# Patient Record
Sex: FEMALE | Race: OTHER | Employment: UNEMPLOYED | URBAN - METROPOLITAN AREA
[De-identification: names, ages, dates, MRNs, and addresses within clinical notes are randomized per-mention and may not be internally consistent; named-entity substitution may affect disease eponyms.]

---

## 2017-01-06 ENCOUNTER — ROUTINE PRENATAL (OUTPATIENT)
Dept: FAMILY MEDICINE CLINIC | Age: 32
End: 2017-01-06

## 2017-01-06 VITALS
DIASTOLIC BLOOD PRESSURE: 66 MMHG | OXYGEN SATURATION: 100 % | HEART RATE: 82 BPM | TEMPERATURE: 98.1 F | BODY MASS INDEX: 32.47 KG/M2 | RESPIRATION RATE: 17 BRPM | WEIGHT: 190.2 LBS | SYSTOLIC BLOOD PRESSURE: 102 MMHG | HEIGHT: 64 IN

## 2017-01-06 DIAGNOSIS — O09.892 SUPERVISION OF OTHER HIGH RISK PREGNANCIES, SECOND TRIMESTER: Primary | ICD-10-CM

## 2017-01-06 DIAGNOSIS — Z3A.32 32 WEEKS GESTATION OF PREGNANCY: ICD-10-CM

## 2017-01-06 LAB
BILIRUB UR QL STRIP: NEGATIVE
GLUCOSE UR-MCNC: NEGATIVE MG/DL
KETONES P FAST UR STRIP-MCNC: NORMAL MG/DL
PH UR STRIP: 7 [PH] (ref 4.6–8)
PROT UR QL STRIP: NEGATIVE MG/DL
SP GR UR STRIP: 1.02 (ref 1–1.03)
UA UROBILINOGEN AMB POC: NORMAL (ref 0.2–1)
URINALYSIS CLARITY POC: NORMAL
URINALYSIS COLOR POC: YELLOW
URINE BLOOD POC: NEGATIVE
URINE LEUKOCYTES POC: NEGATIVE
URINE NITRITES POC: NEGATIVE

## 2017-01-06 NOTE — PROGRESS NOTES
RETURN OB VISIT SUMMARY    Subjective:   She has no unusual complaints and denies any vb, lof, ctxs, and notes good fetal movement. Objective:   Physical Exam:  ABDOMEN: obese, fundus soft, nontender 32 weeks size and FHT present @ 140s bpm  See prenatal flowsheet and physical exam section    Assessment/Plan:   Supervision of other high risk pregnancy at 32w1d  ED warnings. RTC 2 wks. Normal glucola  Increase iron to BID. Routine Prenatal care.

## 2017-01-06 NOTE — MR AVS SNAPSHOT
Visit Information Lor Traci y Rachel Personal Médico Departamento Teléfono del [facility]. Número de visita 1/6/2017  9:30 AM Lou Oswald [address] [facility] [phone] [id number] Your Appointments 1/16/2017  9:45 AM  
OB VISIT with Lou Oswald MD  
[address] [place] [address]) Appt Note: routine prenatal 33wks; routine prenatal 33wks [address] [place] [address]  
[phone]  
  
   
 [address] [facility] [phone]  
  
    
 1/20/2017  9:30 AM  
OB VISIT with Lou Oswald MD  
[address] [place] [address]) Appt Note: routine prenatal  
 [address] [place] [address]  
[phone] Upcoming Health Maintenance Date Due  
 PAP AKA CERVICAL CYTOLOGY 6/25/2017 DTaP/Tdap/Td series (2 - Td) 12/7/2026 [place]  Review Complete El: 1/6/2017 Por: MD Timoteo Giraldo del:  1/6/2017 No Known Allergies Vacunas actuales Revisadas el:  12/7/2016 Cherry Amabile Influenza Vaccine (Quad) PF 12/7/2016, 12/26/2014 Tdap 12/7/2016, 12/26/2014 No revisadas esta visita You Were Diagnosed With   
  
 [place] Supervision of other high risk pregnancies, second trimester    -  Primary ICD-10-CM: I73.828 ICD-9-CM: V23.89   
 32 weeks gestation of pregnancy     ICD-10-CM: Z3A.32 
ICD-9-CM: V22.2 Partes vitales PS Pulso Temperatura Resp [place] ( percentil de crecimiento) Peso (percentil de crecimiento) 102/66 (BP 1 Location: Right arm, BP Patient Position: Sitting) 82 98.1 °F (36.7 °C) (Oral) 17 5' 4\" (1.626 m) 190 lb 3.2 oz (86.3 kg) LMP (última grady) SpO2 BMI (IMC) Estado obstétrico Estatus de tabaquísmo 05/26/2016 100% 32.65 kg/m2 Pregnant Never Smoker BMI and BSA Data Body Mass Index Body Surface Area  
 32.65 kg/m 2 1.97 m 2 Anish [facility] Name Phone [facility] [address] [phone] [place] lista de medicamentos actualizada Lista actualizada el: 17  9:52 AM.  Gabby Domingo use oakley lista de medicamentos más reciente. PRENATAL PO Take  by mouth.  
  
 sodium chloride 0.65 % nasal spray También conocido kathryn:  SALINE NASAL  
1 spray by Both Nostrils route as needed for Congestion. Hicimos lo siguiente AMB POC URINALYSIS DIP STICK AUTO W/O MICRO [79016 CPT(R)] Instrucciones para el Paciente Weeks 32 to 34 of Your Pregnancy: Care Instructions Your Care Instructions During the last few weeks of your pregnancy, you may have more aches and pains. It's important to rest when you can. Your growing baby is putting more pressure on your bladder. So you may need to urinate more often. Hemorrhoids are also common. These are painful, itchy veins in the rectal area. In the 36th week, most women have a test for group B streptococcus (GBS). GBS is a common bacteria that can live in the vagina and rectum. It can make your baby sick after birth. If you test positive, you will get antibiotics during labor. These will keep your baby from getting the bacteria. You may want to talk with your doctor about banking your baby's umbilical cord blood. This is the blood left in the cord after birth. If you want to save this blood, you must arrange it ahead of time. You can't decide at the last minute. If you haven't already had the Tdap shot during this pregnancy, talk to your doctor about getting it. It will help protect your  against pertussis infection. Follow-up care is a key part of your treatment and safety. Be sure to make and go to all appointments, and call your doctor if you are having problems. It's also a good idea to know your test results and keep a list of the medicines you take. How can you care for yourself at home? Ease hemorrhoids · Get more liquids, fruits, vegetables, and fiber in your diet. This will help keep your stools soft. · Avoid sitting for too long. Lie on your left side several times a day. · Clean yourself with soft, moist toilet paper. Or you can use witch hazel pads or personal hygiene pads. · If you are uncomfortable, try ice packs. Or you can sit in a warm sitz bath. Do these for 20 minutes at a time, as needed. · Use hydrocortisone cream for pain and itching. Two examples are Anusol and Preparation H Hydrocortisone. · Ask your doctor about taking an over-the-counter stool softener. Consider breastfeeding · Experts recommend that women breastfeed for 1 year or longer. Breast milk is the perfect food for babies. · Breast milk is easier for babies to digest than formula. And it is always available, just the right temperature, and free. · In general, babies who are  are healthier than formula-fed babies. ¨  babies are less likely to get ear infections, colds, diarrhea, and pneumonia. ¨  babies who are fed only breast milk are less likely to get asthma and allergies. ¨  babies are less likely to be obese. ¨  babies are less likely to get diabetes or heart disease. · Women who breastfeed have less bleeding after the birth. Their uteruses also shrink back faster. · Some women who breastfeed lose weight faster. Making milk burns calories. · Breastfeeding can lower your risk of breast cancer, ovarian cancer, and osteoporosis. Decide about circumcision for boys · As you make this decision, it may help to think about your personal, Tenriism, and family traditions. You get to decide if you will keep your son's penis natural or if he will be circumcised. · If you decide that you would like to have your baby circumcised, talk with your doctor. You can share your concerns about pain. And you can discuss your preferences for anesthesia. Where can you learn more? Go to http://greg-kelvin.info/. Enter L881 in the search box to learn more about \"Weeks 32 to 34 of Your Pregnancy: Care Instructions. \" Current as of: May 30, 2016 Content Version: 11.1 © 9130-7334 [employer]. Care instructions adapted under license by Spl[employer] (which disclaims liability or warranty for this information). If you have questions about a medical condition or this instruction, always ask your healthcare professional. [address] any warranty or liability for your use of this information. Pregnancy Precautions: Care Instructions Your Care Instructions There is no sure way to prevent labor before your due date ( labor) or to prevent most other pregnancy problems. But there are things you can do to increase your chances of a healthy pregnancy. Go to your appointments, follow your doctor's advice, and take good care of yourself. Eat well, and exercise (if your doctor agrees). And make sure to drink plenty of water. Follow-up care is a key part of your treatment and safety. Be sure to make and go to all appointments, and call your doctor if you are having problems. It's also a good idea to know your test results and keep a list of the medicines you take. How can you care for yourself at home? · Make sure you go to your prenatal appointments. At each visit, your doctor will check your blood pressure. Your doctor will also check to see if you have protein in your urine. High blood pressure and protein in urine are signs of preeclampsia. This condition can be dangerous for you and your baby. · Drink plenty of fluids, enough so that your urine is light yellow or clear like water. Dehydration can cause contractions. If you have kidney, heart, or liver disease and have to limit fluids, talk with your doctor before you increase the amount of fluids you drink. · Tell your doctor right away if you notice any symptoms of an infection, such as: ¨ Burning when you urinate. ¨ A foul-smelling discharge from your vagina. ¨ Vaginal itching. ¨ Unexplained fever. ¨ Unusual pain or soreness in your uterus or lower belly. · Eat a balanced diet. Include plenty of foods that are high in calcium and iron. ¨ Foods high in calcium include milk, cheese, yogurt, almonds, and broccoli. ¨ Foods high in iron include red meat, shellfish, poultry, eggs, beans, raisins, whole-grain bread, and leafy green vegetables. · Do not smoke. If you need help quitting, talk to your doctor about stop-smoking programs and medicines. These can increase your chances of quitting for good. · Do not drink alcohol or use illegal drugs. · Follow your doctor's directions about activity. Your doctor will let you know how much, if any, exercise you can do. · Ask your doctor if you can have sex. If you are at risk for early labor, your doctor may ask you to not have sex. · Take care to prevent falls. During pregnancy, your joints are loose, and your balance is off. Sports such as bicycling, skiing, or in-line skating can increase your risk of falling. And don't ride horses or motorcycles, dive, water ski, scuba dive, or parachute jump while you are pregnant. · Avoid getting very hot. Do not use saunas or hot tubs. Avoid staying out in the sun in hot weather for long periods. Take acetaminophen (Tylenol) to lower a high fever. · Do not take any over-the-counter or herbal medicines or supplements without talking to your doctor or pharmacist first. 
When should you call for help? Call 911 anytime you think you may need emergency care. For example, call if: 
· You passed out (lost consciousness). · You have severe vaginal bleeding. · You have severe pain in your belly or pelvis.  
· You have had fluid gushing or leaking from your vagina and you know or think the umbilical cord is bulging into your vagina. If this happens, immediately get down on your knees so your rear end (buttocks) is higher than your head. This will decrease the pressure on the cord until help arrives. Call your doctor now or seek immediate medical care if: 
· You have signs of preeclampsia, such as: 
¨ Sudden swelling of your face, hands, or feet. ¨ New vision problems (such as dimness or blurring). ¨ A severe headache. · You have any vaginal bleeding. · You have belly pain or cramping. · You have a fever. · You have had regular contractions (with or without pain) for an hour. This means that you have 8 or more within 1 hour or 4 or more in 20 minutes after you change your position and drink fluids. · You have a sudden release of fluid from your vagina. · You have low back pain or pelvic pressure that does not go away. · You notice that your baby has stopped moving or is moving much less than normal. 
Watch closely for changes in your health, and be sure to contact your doctor if you have any problems. Where can you learn more? Go to http://greg-kelvin.info/. Enter [id number] in the search box to learn more about \"Pregnancy Precautions: Care Instructions. \" Current as of: May 30, 2016 Content Version: 11.1 © 1453-4248 [employer]. Care instructions adapted under license by [employer] (which disclaims liability or warranty for this information). If you have questions about a medical condition or this instruction, always ask your healthcare professional. [place] [address] any warranty or liability for your use of this information. Iron-Rich Diet: Care Instructions Your Care Instructions Your body needs iron to make hemoglobin. Hemoglobin is a substance in red blood cells that carries oxygen from the lungs to cells all through your body.  If you do not get enough iron, your body makes fewer and smaller red blood cells. As a result, your body's cells may not get enough oxygen. Adult men need 8 milligrams of iron a day; adult women need 18 milligrams of iron a day. After menopause, women need 8 milligrams of iron a day. A pregnant woman needs 27 milligrams of iron a day. Infants and young children have higher iron needs relative to their size than other age groups. People who have lost blood because of ulcers or heavy menstrual periods may become very low in iron and may develop anemia. Most people can get the iron their bodies need by eating enough of certain iron-rich foods. Your doctor may recommend that you take an iron supplement along with eating an iron-rich diet. Follow-up care is a key part of your treatment and safety. Be sure to make and go to all appointments, and call your doctor if you are having problems. Its also a good idea to know your test results and keep a list of the medicines you take. How can you care for yourself at home? · Make iron-rich foods a part of your daily diet. Iron-rich foods include: ¨ All meats, such as chicken, beef, lamb, pork, fish, and shellfish. Liver is especially high in iron. ¨ Leafy green vegetables. ¨ Raisins, peas, beans, lentils, barley, and eggs. ¨ Iron-fortified breakfast cereals. · Eat foods with vitamin C along with iron-rich foods. Vitamin C helps you absorb more iron from food. Drink a glass of orange juice or another citrus juice with your food. · Eat meat and vegetables or grains together. The iron in meat helps your body absorb the iron in other foods. Where can you learn more? Go to http://greg-kelvin.info/. Enter [id number] in the search box to learn more about \"Iron-Rich Diet: Care Instructions. \" Current as of: July 26, 2016 Content Version: 11.1 © 4223-9354 [employer].  Care instructions adapted under license by IRI (which disclaims liability or warranty for this information). If you have questions about a medical condition or this instruction, always ask your healthcare professional. [address] any warranty or liability for your use of this information. Iron Deficiency Anemia During Pregnancy: Care Instructions Your Care Instructions Iron deficiency anemia means that you don't have enough iron in your blood. You need even more iron when you are pregnant. Without enough iron, you may feel weak and sick. Your skin may look pale. Low iron can cause problems when you give birth. And your risk for problems after you have the baby may rise. Severe anemia is rare. But if you get it, you may be more likely to have your baby early ( birth). Or your baby may have a low birth weight. The food you eat may not give you as much iron as you need. Iron pills can help. Your doctor may advise you to take them. Follow-up care is a key part of your treatment and safety. Be sure to make and go to all appointments, and call your doctor if you are having problems. It's also a good idea to know your test results and keep a list of the medicines you take. How can you care for yourself at home? · If your doctor recommends a multivitamin or iron supplement, take it as directed. Call your doctor if you think you are having a problem with your supplements. · If your doctor tells you to take iron pills: ¨ Try to take the pills on an empty stomach about 1 hour before or 2 hours after meals. But you may need to take iron with food to avoid an upset stomach. ¨ Do not take antacids or drink milk or caffeine drinks (such as coffee, tea, or cola) at the same time or within 2 hours of the time that you take your iron. They can keep your body from absorbing the iron well. ¨ Vitamin C (from food or supplements) helps your body absorb iron.  Try taking iron pills with a glass of orange juice or some other food high in vitamin C. 
 ¨ Iron pills may cause stomach problems, such as heartburn, nausea, diarrhea, constipation, and cramps. Be sure to drink plenty of fluids. And include fruits, vegetables, and fiber in your diet each day. ¨ Do not stop taking iron pills without talking to your doctor first. Even after you start to feel better, it will take several months for your body to build up a store of iron. Call your doctor if you think you are having a problem with your iron pills. ¨ If you miss a pill, do not take a double dose of iron. ¨ Keep iron pills out of the reach of small children. An overdose of iron can be very dangerous. · Eat foods rich in iron. These include red meat, shellfish, poultry, eggs, beans, raisins, whole-grain bread, and leafy green vegetables. · Talk to your doctor about any cravings for nonfood items such as dirt, ashes, lui, or chalk. These cravings can be a sign of iron deficiency anemia. When should you call for help? Call your doctor now or seek immediate medical care if: 
· You are dizzy or lightheaded, or you feel like you may faint. Watch closely for changes in your health, and be sure to contact your doctor if: 
· You have new fatigue, or your fatigue is worse. · You have problems with your medicine, such as nausea and constipation. Where can you learn more? Go to http://greg-kelvin.info/. Enter F416 in the search box to learn more about \"Iron Deficiency Anemia During Pregnancy: Care Instructions. \" Current as of: June 8, 2016 Content Version: 11.1 © 8856-1870 Healthwise, Incorporated. Care instructions adapted under license by [employer] (which disclaims liability or warranty for this information). If you have questions about a medical condition or this instruction, always ask your healthcare professional. [address] any warranty or liability for your use of this information. Introducing [facility]! [facility] introduce portal paciente MyChart . Ahora se puede acceder a partes de rodriguez expediente médico, enviar por correo electrónico la oficina de rodriguez médico y solicitar renovaciones de medicamentos en línea. En rodriguez navegador de Internet , Everton Adamseddon a [email or web address] Alfredito clic en el usuario por Claire Khan? Minal Kasper clic aquí en la sesión Lia Franco. Verá la página de registro [place]. Ingrese rodriguez código de [employer] manuel y kathryn aparece a continuación. Usted no tendrá que [employer] código después de maryuri completado el proceso de registro . Si usted no se inscribe antes de la fecha de caducidad , debe solicitar un nuevo código. · MyChart Código de acceso : [id number] Expires: 1/15/2017  9:32 AM 
 
Ingresa los últimos cuatro dígitos de rodriguez Número de Seguro Social ( xxxx ) y fecha de nacimiento ( dd / mm / aaaa ) kathryn se indica y alfredito clic en Enviar. Usted será llevado a la siguiente página de registro . Crear un ID MyChart . Esta será rodriguez ID de inicio de sesión de MyChart y no puede ser [place] , por lo que pensar en sima que es [place] y fácil de recordar . Crear sima contraseña MyChart . Usted puede cambiar rodriguez contraseña en cualquier momento . Ingrese rodriguez Password Reset de preguntas y [place] . [place] se puede utilizar en un momento posterior si usted olvida rodriguez contraseña. Introduzca rodriguez dirección de correo electrónico . Angely Gen recibirá sima notificación por correo electrónico cuando la nueva información está disponible en MyChart . Verlinda Rumpf clic en Registrarse. Warner Homme milagro y descargar porciones de rodriguez expediente médico. 
Alfredito clic en el enlace de descarga del menú Resumen para descargar sima copia portátil de rodriguez información médica . Si tiene [employer] , por favor visite la sección de preguntas frecuentes del sitio web MyChart . Recuerde, MyChart NO es que se utilizará para las necesidades urgentes. Para emergencias médicas , llame al 911 . Ahora disponible en rodriguez iPhone y Android ! Por favor proporcione raissa resumen de la documentación de cuidado a rodriguez próximo proveedor. Your primary care clinician is listed as Farhat Barnes. If you have any questions after today's visit, please call [phone].

## 2017-01-06 NOTE — PATIENT INSTRUCTIONS
Weeks 32 to 34 of Your Pregnancy: Care Instructions  Your Care Instructions    During the last few weeks of your pregnancy, you may have more aches and pains. It's important to rest when you can. Your growing baby is putting more pressure on your bladder. So you may need to urinate more often. Hemorrhoids are also common. These are painful, itchy veins in the rectal area. In the 36th week, most women have a test for group B streptococcus (GBS). GBS is a common bacteria that can live in the vagina and rectum. It can make your baby sick after birth. If you test positive, you will get antibiotics during labor. These will keep your baby from getting the bacteria. You may want to talk with your doctor about banking your baby's umbilical cord blood. This is the blood left in the cord after birth. If you want to save this blood, you must arrange it ahead of time. You can't decide at the last minute. If you haven't already had the Tdap shot during this pregnancy, talk to your doctor about getting it. It will help protect your  against pertussis infection. Follow-up care is a key part of your treatment and safety. Be sure to make and go to all appointments, and call your doctor if you are having problems. It's also a good idea to know your test results and keep a list of the medicines you take. How can you care for yourself at home? Ease hemorrhoids  · Get more liquids, fruits, vegetables, and fiber in your diet. This will help keep your stools soft. · Avoid sitting for too long. Lie on your left side several times a day. · Clean yourself with soft, moist toilet paper. Or you can use witch hazel pads or personal hygiene pads. · If you are uncomfortable, try ice packs. Or you can sit in a warm sitz bath. Do these for 20 minutes at a time, as needed. · Use hydrocortisone cream for pain and itching. Two examples are Anusol and Preparation H Hydrocortisone.   · Ask your doctor about taking an over-the-counter stool softener. Consider breastfeeding  · Experts recommend that women breastfeed for 1 year or longer. Breast milk is the perfect food for babies. · Breast milk is easier for babies to digest than formula. And it is always available, just the right temperature, and free. · In general, babies who are  are healthier than formula-fed babies. ¨  babies are less likely to get ear infections, colds, diarrhea, and pneumonia. ¨  babies who are fed only breast milk are less likely to get asthma and allergies. ¨  babies are less likely to be obese. ¨  babies are less likely to get diabetes or heart disease. · Women who breastfeed have less bleeding after the birth. Their uteruses also shrink back faster. · Some women who breastfeed lose weight faster. Making milk burns calories. · Breastfeeding can lower your risk of breast cancer, ovarian cancer, and osteoporosis. Decide about circumcision for boys  · As you make this decision, it may help to think about your personal, Muslim, and family traditions. You get to decide if you will keep your son's penis natural or if he will be circumcised. · If you decide that you would like to have your baby circumcised, talk with your doctor. You can share your concerns about pain. And you can discuss your preferences for anesthesia. Where can you learn more? Go to http://greg-kelvin.info/. Enter E664 in the search box to learn more about \"Weeks 32 to 34 of Your Pregnancy: Care Instructions. \"  Current as of: May 30, 2016  Content Version: 11.1  © 9967-0261 [employer]. Care instructions adapted under license by [employer] (which disclaims liability or warranty for this information).  If you have questions about a medical condition or this instruction, always ask your healthcare professional. [place] [address] any warranty or liability for your use of this information. Pregnancy Precautions: Care Instructions  Your Care Instructions  There is no sure way to prevent labor before your due date ( labor) or to prevent most other pregnancy problems. But there are things you can do to increase your chances of a healthy pregnancy. Go to your appointments, follow your doctor's advice, and take good care of yourself. Eat well, and exercise (if your doctor agrees). And make sure to drink plenty of water. Follow-up care is a key part of your treatment and safety. Be sure to make and go to all appointments, and call your doctor if you are having problems. It's also a good idea to know your test results and keep a list of the medicines you take. How can you care for yourself at home? · Make sure you go to your prenatal appointments. At each visit, your doctor will check your blood pressure. Your doctor will also check to see if you have protein in your urine. High blood pressure and protein in urine are signs of preeclampsia. This condition can be dangerous for you and your baby. · Drink plenty of fluids, enough so that your urine is light yellow or clear like water. Dehydration can cause contractions. If you have kidney, heart, or liver disease and have to limit fluids, talk with your doctor before you increase the amount of fluids you drink. · Tell your doctor right away if you notice any symptoms of an infection, such as:  ¨ Burning when you urinate. ¨ A foul-smelling discharge from your vagina. ¨ Vaginal itching. ¨ Unexplained fever. ¨ Unusual pain or soreness in your uterus or lower belly. · Eat a balanced diet. Include plenty of foods that are high in calcium and iron. ¨ Foods high in calcium include milk, cheese, yogurt, almonds, and broccoli. ¨ Foods high in iron include red meat, shellfish, poultry, eggs, beans, raisins, whole-grain bread, and leafy green vegetables. · Do not smoke.  If you need help quitting, talk to your doctor about stop-smoking programs and medicines. These can increase your chances of quitting for good. · Do not drink alcohol or use illegal drugs. · Follow your doctor's directions about activity. Your doctor will let you know how much, if any, exercise you can do. · Ask your doctor if you can have sex. If you are at risk for early labor, your doctor may ask you to not have sex. · Take care to prevent falls. During pregnancy, your joints are loose, and your balance is off. Sports such as bicycling, skiing, or in-line skating can increase your risk of falling. And don't ride horses or motorcycles, dive, water ski, scuba dive, or parachute jump while you are pregnant. · Avoid getting very hot. Do not use saunas or hot tubs. Avoid staying out in the sun in hot weather for long periods. Take acetaminophen (Tylenol) to lower a high fever. · Do not take any over-the-counter or herbal medicines or supplements without talking to your doctor or pharmacist first.  When should you call for help? Call 911 anytime you think you may need emergency care. For example, call if:  · You passed out (lost consciousness). · You have severe vaginal bleeding. · You have severe pain in your belly or pelvis. · You have had fluid gushing or leaking from your vagina and you know or think the umbilical cord is bulging into your vagina. If this happens, immediately get down on your knees so your rear end (buttocks) is higher than your head. This will decrease the pressure on the cord until help arrives. Call your doctor now or seek immediate medical care if:  · You have signs of preeclampsia, such as:  ¨ Sudden swelling of your face, hands, or feet. ¨ New vision problems (such as dimness or blurring). ¨ A severe headache. · You have any vaginal bleeding. · You have belly pain or cramping. · You have a fever. · You have had regular contractions (with or without pain) for an hour.  This means that you have 8 or more within 1 hour or 4 or more in 20 minutes after you change your position and drink fluids. · You have a sudden release of fluid from your vagina. · You have low back pain or pelvic pressure that does not go away. · You notice that your baby has stopped moving or is moving much less than normal.  Watch closely for changes in your health, and be sure to contact your doctor if you have any problems. Where can you learn more? Go to http://greg-kelvin.info/. Enter [id number] in the search box to learn more about \"Pregnancy Precautions: Care Instructions. \"  Current as of: May 30, 2016  Content Version: 11.1  © 8421-9357 [employer]. Care instructions adapted under license by [employer] (which disclaims liability or warranty for this information). If you have questions about a medical condition or this instruction, always ask your healthcare professional. [address] any warranty or liability for your use of this information. Iron-Rich Diet: Care Instructions  Your Care Instructions  Your body needs iron to make hemoglobin. Hemoglobin is a substance in red blood cells that carries oxygen from the lungs to cells all through your body. If you do not get enough iron, your body makes fewer and smaller red blood cells. As a result, your body's cells may not get enough oxygen. Adult men need 8 milligrams of iron a day; adult women need 18 milligrams of iron a day. After menopause, women need 8 milligrams of iron a day. A pregnant woman needs 27 milligrams of iron a day. Infants and young children have higher iron needs relative to their size than other age groups. People who have lost blood because of ulcers or heavy menstrual periods may become very low in iron and may develop anemia. Most people can get the iron their bodies need by eating enough of certain iron-rich foods. Your doctor may recommend that you take an iron supplement along with eating an iron-rich diet.   Follow-up care is a key part of your treatment and safety. Be sure to make and go to all appointments, and call your doctor if you are having problems. Its also a good idea to know your test results and keep a list of the medicines you take. How can you care for yourself at home? · Make iron-rich foods a part of your daily diet. Iron-rich foods include:  ¨ All meats, such as chicken, beef, lamb, pork, fish, and shellfish. Liver is especially high in iron. ¨ Leafy green vegetables. ¨ Raisins, peas, beans, lentils, barley, and eggs. ¨ Iron-fortified breakfast cereals. · Eat foods with vitamin C along with iron-rich foods. Vitamin C helps you absorb more iron from food. Drink a glass of orange juice or another citrus juice with your food. · Eat meat and vegetables or grains together. The iron in meat helps your body absorb the iron in other foods. Where can you learn more? Go to http://greg-kelvin.info/. Enter [id number] in the search box to learn more about \"Iron-Rich Diet: Care Instructions. \"  Current as of: 2016  Content Version: 11.1  © 9605-6868 [employer]. Care instructions adapted under license by [employer] (which disclaims liability or warranty for this information). If you have questions about a medical condition or this instruction, always ask your healthcare professional. [place] [address] any warranty or liability for your use of this information. Iron Deficiency Anemia During Pregnancy: Care Instructions  Your Care Instructions  Iron deficiency anemia means that you don't have enough iron in your blood. You need even more iron when you are pregnant. Without enough iron, you may feel weak and sick. Your skin may look pale. Low iron can cause problems when you give birth. And your risk for problems after you have the baby may rise. Severe anemia is rare.  But if you get it, you may be more likely to have your baby early ( birth). Or your baby may have a low birth weight. The food you eat may not give you as much iron as you need. Iron pills can help. Your doctor may advise you to take them. Follow-up care is a key part of your treatment and safety. Be sure to make and go to all appointments, and call your doctor if you are having problems. It's also a good idea to know your test results and keep a list of the medicines you take. How can you care for yourself at home? · If your doctor recommends a multivitamin or iron supplement, take it as directed. Call your doctor if you think you are having a problem with your supplements. · If your doctor tells you to take iron pills:  ¨ Try to take the pills on an empty stomach about 1 hour before or 2 hours after meals. But you may need to take iron with food to avoid an upset stomach. ¨ Do not take antacids or drink milk or caffeine drinks (such as coffee, tea, or cola) at the same time or within 2 hours of the time that you take your iron. They can keep your body from absorbing the iron well. ¨ Vitamin C (from food or supplements) helps your body absorb iron. Try taking iron pills with a glass of orange juice or some other food high in vitamin C.  ¨ Iron pills may cause stomach problems, such as heartburn, nausea, diarrhea, constipation, and cramps. Be sure to drink plenty of fluids. And include fruits, vegetables, and fiber in your diet each day. ¨ Do not stop taking iron pills without talking to your doctor first. Even after you start to feel better, it will take several months for your body to build up a store of iron. Call your doctor if you think you are having a problem with your iron pills. ¨ If you miss a pill, do not take a double dose of iron. ¨ Keep iron pills out of the reach of small children. An overdose of iron can be very dangerous. · Eat foods rich in iron.  These include red meat, shellfish, poultry, eggs, beans, raisins, whole-grain bread, and leafy green vegetables. · Talk to your doctor about any cravings for nonfood items such as dirt, ashes, lui, or chalk. These cravings can be a sign of iron deficiency anemia. When should you call for help? Call your doctor now or seek immediate medical care if:  · You are dizzy or lightheaded, or you feel like you may faint. Watch closely for changes in your health, and be sure to contact your doctor if:  · You have new fatigue, or your fatigue is worse. · You have problems with your medicine, such as nausea and constipation. Where can you learn more? Go to http://greg-kelvin.info/. Enter R247 in the search box to learn more about \"Iron Deficiency Anemia During Pregnancy: Care Instructions. \"  Current as of: June 8, 2016  Content Version: 11.1  © 3333-1090 [employer]. Care instructions adapted under license by [employer] (which disclaims liability or warranty for this information). If you have questions about a medical condition or this instruction, always ask your healthcare professional. [place] [address] any warranty or liability for your use of this information.

## 2017-01-06 NOTE — PROGRESS NOTES
Chief Complaint   Patient presents with    Routine Prenatal Visit      32w & 1d     1. Have you been to the ER, urgent care clinic since your last visit? Hospitalized since your last visit? No    2. Have you seen or consulted any other health care providers outside of the [address] [place] [address] since your last visit? Include any pap smears or colon screening. No     Pt denies any cramping, bleeding, leakage of fluid. + fetal movement.

## 2017-01-20 ENCOUNTER — ROUTINE PRENATAL (OUTPATIENT)
Dept: FAMILY MEDICINE CLINIC | Age: 32
End: 2017-01-20

## 2017-01-20 VITALS
OXYGEN SATURATION: 100 % | SYSTOLIC BLOOD PRESSURE: 98 MMHG | RESPIRATION RATE: 16 BRPM | TEMPERATURE: 98.9 F | HEART RATE: 100 BPM | DIASTOLIC BLOOD PRESSURE: 63 MMHG | WEIGHT: 192 LBS | BODY MASS INDEX: 32.78 KG/M2 | HEIGHT: 64 IN

## 2017-01-20 DIAGNOSIS — O09.893 SUPERVISION OF OTHER HIGH RISK PREGNANCY, ANTEPARTUM, THIRD TRIMESTER: Primary | ICD-10-CM

## 2017-01-20 LAB
BILIRUB UR QL STRIP: NEGATIVE
GLUCOSE UR-MCNC: NEGATIVE MG/DL
KETONES P FAST UR STRIP-MCNC: NORMAL MG/DL
PH UR STRIP: 7 [PH] (ref 4.6–8)
PROT UR QL STRIP: NEGATIVE MG/DL
SP GR UR STRIP: 1.02 (ref 1–1.03)
UA UROBILINOGEN AMB POC: NORMAL (ref 0.2–1)
URINALYSIS CLARITY POC: CLEAR
URINALYSIS COLOR POC: YELLOW
URINE BLOOD POC: NEGATIVE
URINE LEUKOCYTES POC: NEGATIVE
URINE NITRITES POC: NEGATIVE

## 2017-01-20 NOTE — PROGRESS NOTES
RETURN OB VISIT SUMMARY    Subjective:   She has no unusual complaints and denies any vb, lof, ctxs, and notes good fetal movements. Objective:   Physical Exam:  ABDOMEN: obese, fundus soft, nontender 37 cm and FHT present @ 140s bpm with accelerations to 160s bpm  See prenatal flowsheet and physical exam section    Assessment/Plan:   Normal pregnancy @ 34w1d  ED warnings. RTC 2 wks. Size greater than dates and hx of  demise. Schedule for  testing with growth in 2 wks. Routine Prenatal care.

## 2017-01-20 NOTE — PATIENT INSTRUCTIONS
Semanas 34 a 36 de rodriguez embarazo: Instrucciones de cuidado - [ [place] 34 to 39 of Your Pregnancy: Care Instructions ]  Instrucciones de cuidado    A estas [place], rodriguez bebé y rodriguez abdomen habrán crecido considerablemente. Demarcus es Lonnie de mundo a milan. En un embarazo a término se puede mundo a [employer] 40 y 43. Los pulmones de rodriguez bebé están demarcus listos para respirar aire. Los huesos de la pepe de rodriguez bebé ahora son bastante firmes kathryn para protegerla parag se mantienen lo suficientemente blandos kathryn para moverse al atravesar el canal de [place]. Es posible que sienta entusiasmo, margo, ansiedad o miedo. Quizá se pregunte cómo se dará cuenta de si está en trabajo de parto o qué esperar en carlos momento. Trate de ser flexible con dominique expectativas respecto del nacimiento. Dado que cada nacimiento es diferente, no hay manera de saber exactamente cómo será rodriguez parto. Esta hoja de cuidados la ayudará a saber qué esperar y cómo prepararse. Le podría facilitar el parto. Si todavía no le mon aplicado la vacuna Tdap (tétanos, difteria y tos [place]) mine raissa [place], hable con rodriguez médico acerca de aplicársela. Chuck Melendez a proteger a rodriguez recién nacido contra la infección por tos ferina. En la semana 36, a la mayoría de las mujeres se les hace sima prueba de estreptococos del grace B (GBS, por dominique siglas en inglés). Los estreptococos del grace B son bacterias comunes que pueden vivir en la vagina y el recto. Pueden hacer que rodriguez bebé se enferme después del parto. Si el resultado es positivo, usted recibirá antibióticos mine el trabajo de [place]. Los medicamentos evitarán que rodriguez bebé contraiga las bacterias. La atención de seguimiento es sima parte clave de rodriguez tratamiento y seguridad. Asegúrese de hacer y acudir a todas las citas, y llame a rodriguez médico si está teniendo problemas. También es sima buena idea saber los resultados de los exámenes y mantener sima lista de los medicamentos que navin.   ¿Cómo puede cuidarse en el hogar? Aprenda sobre las alternativas para aliviar el dolor  · El dolor se manifiesta de modo diferente en cada moiz. Hable con rodriguez médico acerca de dominique sentimientos sobre el dolor. · Puede elegir entre varias formas de aliviar el dolor. Estas incluyen medicamentos o técnicas de respiración, así kathryn medidas para estar cómoda. Usted puede utilizar más de Lucio Saint opción. · Si elige un analgésico (medicamento para el dolor) mine el trabajo de [place], hable con rodriguez médico acerca de dominique opciones. Algunos medicamentos reducen la ansiedad y [place] a aliviar parte del dolor. Otros adormecen la parte inferior del cuerpo para que no sienta dolor. · Asegúrese de decirle a rodriguez médico acerca de rodriguez elección de analgésico antes de empezar el trabajo de parto o muy temprano en el [place]. Es posible que pueda cambiar de parecer a medida que avanza el [place]. · Gertrude vez se duerme a sima moiz con medicamentos administrados a través de sima máscara o por vía intravenosa (IV). Trabajo de parto y [place]  · La primera etapa del [place] de parto se divide en zuleima fases: Gerry Charnley y de transición. ¨ La mayoría de las mujeres experimentan la fase latente del [place] de parto en dominique hogares. Usted puede [employer] o descansar, comer refrigerios livianos, beber líquidos eduardo y comenzar a contar las contracciones. ¨ Cuando advierta que se le vuelve difícil hablar mine sima contracción, es posible que esté por pasar a la fase activa. Mine la fase Nishi Chaney, debería ir al hospital si no está allí aún. ¨ Usted está en la fase activa cuando tiene contracciones cada 3 o 4 minutos y rivera alrededor de 60 segundos. El aga uterino comienza a abrirse con Mely Selina. ¨ Si se le rompe la sandeep, las contracciones serán más intensas y más frecuentes. ¨ Mine la fase de transición, el aga uterino se estira y las contracciones se producen con Mely Selina.   ¨ Quizá tenga deseos de pujar, sin embargo es posible que el aga uterino aún no esté preparado. El médico le dirá cuándo pujar. · La segunda etapa comienza cuando el aga uterino se abre por completo y usted está lista para pujar. ¨ Las contracciones son muy intensas a fin de empujar al bebé por el canal de parto. ¨ Sentirá la necesidad de pujar. Podría sentir kathryn si tuviera ganas de evacuar el intestino. ¨ Quizás la entrenen a [place] 41 contracciones. Estas contracciones serán muy intensas parag no ocurrirán con tanta frecuencia. Puede descansar un poco entre contracciones. ¨ Es posible que esté sensible e irritable. Es posible que no se dé cuenta de lo que pasa a rodriguez alrededor. ¨ Un último esfuerzo y habrá nacido rodriguez bebé. · La tercera etapa ocurre cuando con unas cuantas contracciones más se expulsa la placenta. [place] puede durar 30 minutos o menos. · La cuarta etapa es la de recuperación. Es posible que se sienta abrumada con las emociones y exhausta parag alerta. Stacie es un buen momento para comenzar el amamantamiento. ¿Dónde puede encontrar más información en inglés? Amanda Hyde a http://greg-kelvin.info/. Iman Iraheta [id number] en la búsqueda para aprender más acerca de \"Semanas 34 a 39 de rodriguez embarazo: Instrucciones de cuidado - [ Tova Haymaker 34 to 39 of Your Pregnancy: Care Instructions ]. \"  Revisado: 30 cruz, 2016  Versión del contenido: 11.1  © 8081-4046 [employer]. Las instrucciones de cuidado fueron adaptadas bajo licencia por [employer] (which disclaims liability or warranty for this information). Si usted tiene [place] afección médica o sobre estas instrucciones, siempre pregunte a rodriguez profesional de ladan. [employer] niega toda garantía o responsabilidad por rodriguez uso de esta información. Precauciones en el embarazo:  Instrucciones de cuidado - [ Pregnancy Precautions: Care Instructions ]  Instrucciones de cuidado  No hay sima manera sanchez de prevenir el trabajo de parto antes de la fecha esperada (trabajo de parto prematuro) o de prevenir la mayoría de otros problemas en el [facility]. Donnell hay cosas que puede hacer para aumentar las probabilidades de tener un embarazo saludable. Vaya a dominique citas, siga los consejos de rodriguez médico y cuídese. Coma rolf y alfredito ejercicio (si rodriguez médico lo permite). Y asegúrese de alejandro abundante agua. La atención de seguimiento es sima parte clave de rodriguez tratamiento y seguridad. Asegúrese de hacer y acudir a todas las citas, y llame a rodriguez médico si está teniendo problemas. También es sima buena idea saber los resultados de los exámenes y mantener sima lista de los medicamentos que navin. ¿Cómo puede cuidarse en el hogar? · Asegúrese de asistir a las citas prenatales. Rodriguez médico le tomará la presión arterial en cada consulta. Rodriguez médico también comprobará si tiene proteínas en rodriguez orina. Tanto la presión arterial deepthi kathryn la presencia de proteínas en la orina son señales de preeclampsia. Esta afección puede ser peligrosa tanto para usted kathryn para rodriguez bebé. · Nikki abundantes líquidos, suficientes para que rodriguez orina sea de color amarillo saji o transparente kathryn el agua. La deshidratación puede causar contracciones. Si tiene [employer], el corazón o el hígado y tiene que [place]'s líquidos, hable con rodriguez médico antes de aumentar rodriguez consumo. · Notifique a rodriguez médico de inmediato si presenta cualquier síntoma de infección, tales kathryn:  ¨ Ardor cuando orina. ¨ Flujo con mal olor de la vagina. ¨ Comezón en la vagina. ¨ Fiebre sin explicación. ¨ Dolor o sensibilidad inusual en el útero o la parte baja del abdomen. · Aliméntese en forma equilibrada. Incluya muchos alimentos que elpidio ricos en calcio y titus. ¨ Entre los alimentos ricos en calcio se incluyen la [place], el queso, el yogur, Dickie Roof y el brócoli.   ¨ Entre los alimentos ricos en titus se incluyen las kasi martin, los [place], las aves, los [place], los frijoles, las uvas pasas, [place] pan de grano integral y las verduras de hojas verdes. · No fume. Si necesita ayuda para dejar de fumar, hable con rodriguez médico sobre programas y medicamentos para dejar de fumar. Estos pueden aumentar dominique probabilidades de dejar el hábito para siempre. · No david alcohol ni use drogas ilegales. · Siga las instrucciones de rodriguez médico acerca de la [place]. Rodriguez médico le dirá cuánto ejercicio puede hacer. · Pregúntele a rodriguez médico si puede tener [employer]. Si usted está en riesgo de tener trabajo de [place], rodriguez médico podría pedirle que no tenga relaciones sexuales. · [place] precauciones para prevenir las caídas. Vanessa el embarazo las articulaciones están más sueltas y se tiene menos equilibrio. Los deportes tales kathryn el ciclismo, el esquí o el patinaje en línea pueden aumentar el riesgo de caídas. Y no monte a derrick, vira en motocicleta, alfredito clavados, alfredito esquí acuático, bucee, ni salte en paracaídas mientras está embarazada. · Evite calentarse demasiado. No use saunas ni bañeras de hidromasaje. Evite la exposición al sol en climas calientes por mucho tiempo. [place] acetaminofén (Tylenol) para bajar sima fiebre deepthi. · No tome medicamentos de venta shai, productos herbarios ni suplementos sin hablar yuni con rodriguez médico o farmacéutico.  ¿Cuándo debe pedir ayuda? Llame al 911 en cualquier momento que considere que necesita atención de [place]. Por ejemplo, llame si:  · Se desmayó (perdió el conocimiento). · Tiene sangrado vaginal intenso. · Tiene dolor intenso en el vientre o la pelvis. · Le sale abundante líquido o gotea de la vagina y sabe o cristal que el cordón umbilical se está saliendo a rodriguez vagina. Si esto sucede, arrodíllese de inmediato, de manuel forma que dominique nalgas estén más altas que rodriguez pepe. [place] disminuirá la presión sobre el cordón umbilical hasta que llegue la [facility].   Llame a rodriguez médico ahora mismo o busque atención médica inmediata si:  · Tiene señales de preeclampsia, tales kathryn:  ¨ Se le hinchan de manera repentina la felix, las caroline o los pies. ¨ Problemas nuevos con la visión (kathryn oscurecimiento de la visión o visión borrosa). ¨ Dolor de pepe intenso. · Tiene cualquier sangrado vaginal.  · Tiene dolor abdominal o cólicos. · Tiene fiebre. · Ha tenido contracciones regulares (con o sin dolor) por [place]. Alicia significa que tiene 8 o más contracciones en 1 hora o que tiene 4 contracciones o más en 20 minutos después de [place] de posición y alejandro líquidos. · Tiene sima pérdida repentina de líquido por la vagina. · Tiene dolor en la parte baja de la espalda o presión en la pelvis que no desaparece. · Nota que rodriguez bebé ha dejado de moverse o se mueve mucho menos de lo normal.  Preste especial atención a los cambios en rodriguez ladan y asegúrese de comunicarse con rodriguez médico si tiene algún problema. ¿Dónde puede encontrar más información en inglés? Halina Wesley a http://greg-kelvin.info/. Radha Butler [id number] en la búsqueda para aprender más acerca de \"Precauciones en el embarazo: Instrucciones de cuidado - [ Pregnancy Precautions: Care Instructions ]. \"  Revisado: 30 [place], 2016  Versión del contenido: 11.1  © 1672-6984 [employer]wise, Incorporated. Las instrucciones de cuidado fueron adaptadas bajo licencia por [employer] (which disclaims liability or warranty for this information). Si usted tiene [place] afección médica o sobre estas instrucciones, siempre pregunte a rodriguez profesional de ladan. [employer] Inc[employer] niega toda garantía o responsabilidad por rodriguez uso de esta información.

## 2017-01-20 NOTE — PROGRESS NOTES
Chief Complaint   Patient presents with    Routine Prenatal Visit      34w&1d     1. Have you been to the ER, urgent care clinic since your last visit? Hospitalized since your last visit? No    2. Have you seen or consulted any other health care providers outside of the [address] [place] [address] since your last visit? Include any pap smears or colon screening. No     Pt denies any cramping, bleeding or leakage of fluid. + fetal movement.

## 2017-01-20 NOTE — MR AVS SNAPSHOT
Visit Information Ronak Jaime Personal Médico Departamento Teléfono del Dep. Número de visita 1/20/2017  9:30 AM Israel Acosta [address] [facility] [phone] [id number] Follow-up Instructions Return in about 2 weeks (around 2/3/2017), or if symptoms worsen or fail to improve. Follow-up and Disposition History Your Appointments 2/1/2017  2:15 PM  
OB VISIT with Israel Acosta MD  
[address] [facility] [address] [place] [address]) Appt Note: prenatal visit 36wk & 4 d  
 [address] [facility]  
[phone]  
  
   
 [address] Thelma Speak [address] Upcoming Health Maintenance Date Due  
 PAP AKA CERVICAL CYTOLOGY 6/25/2017 DTaP/Tdap/Td series (2 - Td) 12/7/2026 Alergias  Review Complete El: 1/20/2017 Por: Walter Mar LPN A partir del:  1/20/2017 No Known Allergies Vacunas actuales Revisadas el:  12/7/2016 Wilmon Members Influenza Vaccine (Quad) PF 12/7/2016, 12/26/2014 Tdap 12/7/2016, 12/26/2014 No revisadas esta visita You Were Diagnosed With   
  
 Rosita Nip Supervision of other high risk pregnancy, antepartum, third trimester    -  Primary ICD-10-CM: C88.919 ICD-9-CM: V23.89 Partes vitales PS Pulso Temperatura Resp [place] ( percentil de crecimiento) Peso (percentil de crecimiento) 98/63 (BP 1 Location: Left arm, BP Patient Position: Sitting) 100 98.9 °F (37.2 °C) (Oral) 16 5' 4\" (1.626 m) 192 lb (87.1 kg) LMP (última grady) SpO2 BMI (IMC) Estado obstétrico Estatus de tabaquísmo 05/26/2016 100% 32.96 kg/m2 Pregnant Never Smoker Historial de signos vitales BMI and BSA Data Body Mass Index Body Surface Area  
 32.96 kg/m 2 1.98 m 2 [facility] Name Phone [phone] [place], [address] [phone] Rodriguez lista de medicamentos actualizada Lista actualizada el: 1/20/17  9:48 AM.  Raiza Mckinnon use rodriguez lista de medicamentos más reciente. PRENATAL PO Take  by mouth.  
  
 sodium chloride 0.65 % nasal spray También conocido kathryn:  SALINE NASAL  
1 spray by Both Nostrils route as needed for Congestion. Hicimos lo siguiente AMB POC URINALYSIS DIP STICK AUTO W/O MICRO [59959 CPT(R)] Instrucciones de seguimiento Return in about 2 weeks (around 2/3/2017), or if symptoms worsen or fail to improve. Instrucciones para el Paciente Semanas 34 a 36 de rodriguez embarazo: Instrucciones de cuidado - [ [place] 34 to 39 of Your Pregnancy: Care Instructions ] Instrucciones de cuidado A estas [place], rodriguez bebé y rodriguez abdomen habrán crecido considerablemente. Demarcus es [place] de mundo a milan. En un embarazo a término se puede mundo a [employer] 40 y 43. Los pulmones de rodriguez bebé están demarcus listos para respirar aire. Los huesos de la pepe de rodriguez bebé ahora son bastante firmes kathryn para protegerla parag se mantienen lo suficientemente blandos kathryn para moverse al atravesar el canal de [place]. Es posible que sienta entusiasmo, margo, ansiedad o miedo. Quizá se pregunte cómo se dará cuenta de si está en trabajo de parto o qué esperar en carlos momento. Trate de ser flexible con dominique expectativas respecto del nacimiento. Dado que cada nacimiento es diferente, no hay manera de saber exactamente cómo será rodriguez parto. Esta hoja de cuidados la ayudará a saber qué esperar y cómo prepararse. Le podría facilitar el parto. Si todavía no le mon aplicado la vacuna Tdap (tétanos, difteria y tos [place]) mine raissa [place], hable con rodriguez médico acerca de aplicársela. Chuck Al a proteger a rodriguez recién nacido contra la infección por tos ferina. En la semana 36, a la mayoría de las mujeres se les hace sima prueba de estreptococos del grace B (GBS, por dominique siglas en inglés).  Los estreptococos del grace B son bacterias comunes que pueden vivir en la vagina y el recto. Pueden hacer que rodriguez bebé se enferme después del parto. Si el resultado es positivo, usted recibirá antibióticos mine el trabajo de [place]. Los medicamentos evitarán que rodriguez bebé contraiga las bacterias. La atención de seguimiento es sima parte clave de rodriguez tratamiento y seguridad. Asegúrese de hacer y acudir a todas las citas, y llame a rodriguez médico si está teniendo problemas. También es sima buena idea saber los resultados de los exámenes y mantener sima lista de los medicamentos que navin. Cómo puede cuidarse en el hogar? [place] alternativas para aliviar el dolor · El dolor se manifiesta de modo diferente en cada moiz. Hable con rodriguez médico acerca de dominique sentimientos sobre el dolor. · Puede elegir entre varias formas de aliviar el dolor. Estas incluyen medicamentos o técnicas de respiración, así kathryn medidas para estar cómoda. Usted puede utilizar más de Duayne Irma opción. · Si elige un analgésico (medicamento para el dolor) mine el trabajo de [place], hable con rodriguez médico acerca de dominique opciones. Algunos medicamentos reducen la ansiedad y [place] [facility] a aliviar parte del dolor. Otros adormecen la parte inferior del cuerpo para que no sienta dolor. · Asegúrese de decirle a rodriguez médico acerca de rodriguez elección de analgésico antes de empezar el trabajo de parto o muy temprano en el [place]. Es posible que pueda cambiar de parecer a medida que avanza el [place]. · Gertrude vez se duerme a sima moiz con medicamentos administrados a través de sima máscara o por vía intravenosa (IV). Joaquin Amaro y [place] · La primera etapa del [place] de parto se divide en zuleima fases: latente, activa y de transición. ¨ La mayoría de las mujeres experimentan la fase latente del [place] de parto en dominique hogares.  Usted puede [employer] o descansar, comer Geroge Gey, beber líquidos eduardo y comenzar a contar las contracciones. ¨ Cuando advierta que se le vuelve difícil hablar mine sima contracción, es posible que esté por pasar a la fase activa. Mine la fase Kris Christel, debería ir al hospital si no está allí aún. ¨ Usted está en la fase activa cuando tiene contracciones cada 3 o 4 minutos y rivera alrededor de 60 segundos. El aga uterino comienza a abrirse con Kemal Specter. ¨ Si se le rompe la sandeep, las contracciones serán más intensas y más frecuentes. ¨ Mine la fase de transición, el aga uterino se estira y las contracciones se producen con Kemal Specter. ¨ Quizá tenga deseos de pujar, sin embargo es posible que el aga uterino aún no esté preparado. El médico le dirá cuándo pujar. · La segunda etapa comienza cuando el aga uterino se abre por completo y usted está lista para pujar. ¨ Las contracciones son muy intensas a fin de empujar al bebé por el canal de parto. ¨ Sentirá la necesidad de pujar. Podría sentir kathryn si tuviera ganas de evacuar el intestino. ¨ Quizás la entrenen a [place] 41 contracciones. Estas contracciones serán muy intensas parag no ocurrirán con tanta frecuencia. Puede descansar un poco entre contracciones. ¨ Es posible que esté sensible e irritable. Es posible que no se dé cuenta de lo que pasa a rodriguez alrededor. ¨ Un último esfuerzo y habrá nacido rodriguze bebé. · La tercera etapa ocurre cuando con unas cuantas contracciones más se expulsa la placenta. Mar-Mac puede durar 30 minutos o menos. · La cuarta etapa es la de recuperación. Es posible que se sienta abrumada con las emociones y exhausta parag alerta. Stacie es un buen momento para comenzar el amamantamiento. Dónde puede encontrar más información en inglés? Maria Johnson a http://greg-kelvin.info/. Payam Araujo L659 en la búsqueda para aprender más acerca de \"Semanas 34 a 39 de rodriguez embarazo: Instrucciones de cuidado - [ Janay Astorga 34 to 39 of Your Pregnancy: Care Instructions ]. \" 
Revisado: 30 mayo, 2016 Versión del contenido: 11.1 © 8218-8046 Healthwise, Incorporated. Las instrucciones de cuidado fueron adaptadas bajo licencia por [employer] (which disclaims liability or warranty for this information). Si usted tiene [place] afección médica o sobre estas instrucciones, siempre pregunte a rodriguez profesional de ladan. Healthwise, Incorporated niega toda garantía o responsabilidad por rodriguez uso de esta información. Precauciones en el embarazo: Instrucciones de cuidado - [ Pregnancy Precautions: Care Instructions ] Instrucciones de cuidado No hay sima manera sanchez de prevenir el trabajo de parto antes de la fecha esperada (trabajo de parto prematuro) o de prevenir la mayoría de otros problemas en el [facility]. Donnell hay cosas que puede hacer para aumentar las probabilidades de tener un embarazo saludable. Vaya a dominique citas, siga los consejos de rodriguez médico y cuídese. Coma rolf y alfreidto ejercicio (si rodriguez médico lo permite). Y asegúrese de alejandro abundante agua. La atención de seguimiento es sima parte clave de rodriguez tratamiento y seguridad. Asegúrese de hacer y acudir a todas las citas, y llame a rodriguez médico si está teniendo problemas. También es sima buena idea saber los resultados de los exámenes y mantener sima lista de los medicamentos que navin. Cómo puede cuidarse en el hogar? · Asegúrese de asistir a las citas prenatales. Rodriguez médico le tomará la presión arterial en cada consulta. Rodriguez médico también comprobará si tiene proteínas en rodriguez orina. Tanto la presión arterial deepthi kathryn la presencia de proteínas en la orina son señales de preeclampsia. Esta afección puede ser peligrosa tanto para usted kathryn para rodriguez bebé. · Nikki abundantes líquidos, suficientes para que rodriguez orina sea de color amarillo saji o transparente kathryn el agua. La deshidratación puede causar contracciones.  Si tiene [facility], el corazón o el hígado y tiene que Lakesha's líquidos, hable con rodriguez médico antes de aumentar oakley consumo. · Notifique a oakley médico de inmediato si presenta cualquier síntoma de infección, tales kathryn: ¨ Ardor cuando orina. ¨ Flujo con mal olor de la vagina. ¨ Comezón en la vagina. ¨ Fiebre sin explicación. ¨ Dolor o sensibilidad inusual en el útero o la parte baja del abdomen. · Aliméntese en forma equilibrada. Incluya muchos alimentos que elpidio ricos en calcio y titus. ¨ Entre los alimentos ricos en calcio se incluyen la [place], el queso, el yogur, [place] y el brócoli. ¨ Entre los alimentos ricos en titus se incluyen las kasi martin, los [place], las aves, los [place], los frijoles, las uvas pasas, el pan de grano integral y las verduras de hojas verdes. · No fume. Si necesita ayuda para dejar de fumar, hable con oakley médico sobre programas y medicamentos para dejar de fumar. Estos pueden aumentar dominique probabilidades de dejar el hábito para siempre. · No david alcohol ni use drogas ilegales. · Siga las instrucciones de oakley médico acerca de la [place]. Oakley médico le dirá cuánto ejercicio puede hacer. · Pregúntele a oakley médico si puede tener [employer]. Si usted está en riesgo de tener trabajo de [place], oakley médico podría pedirle que no tenga relaciones sexuales. · [place] precauciones para prevenir las caídas. Vanessa el embarazo las articulaciones están más sueltas y se tiene menos equilibrio. Los deportes tales kathryn el ciclismo, el esquí o el patinaje en línea pueden aumentar el riesgo de caídas. Y no monte a derrick, vira en motocicleta, alfredito clavados, alfredito esquí acuático, bucee, ni salte en paracaídas mientras está embarazada. · Evite calentarse demasiado. No use saunas ni bañeras de hidromasaje. Evite la exposición al sol en climas calientes por mucho tiempo. [place] acetaminofén (Tylenol) para bajar sima fiebre deepthi. · No tome medicamentos de venta shai, productos herbarios ni suplementos sin hablar yuni con oakley médico o farmacéutico. 

Cuándo debe pedir ayuda? Llame al 911 en cualquier momento que considere que necesita atención de [place]. Por ejemplo, llame si: 
· Se desmayó (perdió el conocimiento). · Tiene sangrado vaginal intenso. · Tiene dolor intenso en el vientre o la pelvis. · Le sale abundante líquido o gotea de la vagina y sabe o cristal que el cordón umbilical se está saliendo a rodriguez vagina. Si esto sucede, arrodíllese de inmediato, de manuel forma que dominique nalgas estén más altas que rodriguez pepe. [place] disminuirá la presión sobre el cordón umbilical hasta que llegue la [place]. Llame a rodriguez médico ahora mismo o busque atención médica inmediata si: · Tiene señales de preeclampsia, tales kathryn: ¨ Se le hinchan de manera repentina la felix, las caroline o los pies. ¨ Problemas nuevos con la visión (kathryn oscurecimiento de la visión o visión borrosa). ¨ Dolor de pepe intenso. · Tiene cualquier sangrado vaginal. 
· Tiene dolor abdominal o cólicos. · Tiene fiebre. · Ha tenido contracciones regulares (con o sin dolor) por Allan Dana. [place] significa que tiene 8 o más contracciones en 1 hora o que tiene 4 contracciones o más en 20 minutos después de Citizen of [place] de posición y alejandro líquidos. · Tiene sima pérdida repentina de líquido por la vagina. · Tiene dolor en la parte baja de la espalda o presión en la pelvis que no desaparece. · Nota que rodriguez bebé ha dejado de moverse o se mueve mucho menos de lo normal. 
Preste especial atención a los cambios en rodriguez ladan y asegúrese de comunicarse con rodriguez médico si tiene algún problema. Dónde puede encontrar más información en inglés? Marcrobinson Soriano a http://greg-eklvin.info/. Brii Stanley Q438 en la búsqueda para aprender más acerca de \"Precauciones en el embarazo: Instrucciones de cuidado - [ Pregnancy Precautions: Care Instructions ]. \" 
Revisado: 30 mayo, 2016 Versión del contenido: 11.1 © 5266-1033 [employer].  Las instrucciones de cuidado fueron adaptadas bajo licencia por Good Help Connections (which disclaims liability or warranty for this information). Si usted tiene [place] afección médica o sobre estas instrucciones, siempre pregunte a rodriguez profesional de ladan. [facility] Incorporated niega toda garantía o responsabilidad por rodriguez uso de esta información. Introducing [facility] SERVICES! [facility] introduce portal paciente MyChart . Ahora se puede acceder a partes de rodriguez expediente médico, enviar por correo electrónico la oficina de rodriguez médico y solicitar renovaciones de medicamentos en línea. En rodriguez navegador de Internet , Caralee Leobardo a [email or web address] Alfredito clic en el usuario por Terrell Story? Veto Home clic aquí en la sesión [place]. Verá la página de registro [place]. Ingrese rodriguez código de [employer] manuel y kathryn aparece a continuación. Rosalee no tendrá que [employer] código después de maryuri completado el proceso de registro . Si usted no se inscribe antes de la fecha de caducidad , debe solicitar un nuevo código. · MyChart Código de acceso : [id number] Expires: 4/20/2017  9:48 AM 
 
Ingresa los últimos cuatro dígitos de rodriguez Número de Seguro Social ( xxxx ) y fecha de nacimiento ( dd / mm / aaaa ) kathryn se indica y alfredito clic en Enviar. Rosalee será llevado a la siguiente página de registro . Crear un ID MyChart . Esta será rodriguez ID de inicio de sesión de MyChart y no puede ser [place] , por lo que pensar en sima que es [place]  y fácil de recordar . Crear sima contraseña MyChart . Rosalee puede cambiar rodriguez contraseña en cualquier momento . Ingrese rodriguez Password Reset de preguntas y [place] . [place] se puede utilizar en un momento posterior si usted olvida rodriguez contraseña. Introduzca rodriguez dirección de correo electrónico . Ofe Villela recibirá sima notificación por correo electrónico cuando la nueva información está disponible en MyChart . Santiago short en Registrarse.  Sherie Denver milagro y descargar porciones de rodriguez expediente médico. 
 Braulio deja en el enlace de descarga del menú Resumen para descargar sima copia portátil de rodriguez información médica . Si tiene [employer] , por favor visite la sección de preguntas frecuentes del sitio web MyChart . Recuerde, MyChart NO es que se utilizará para las necesidades urgentes. Para emergencias médicas , llame al 911 . Ahora disponible en rodriguez iPhone y Android ! Por favor proporcione raissa resumen de la documentación de cuidado a rodriguez próximo proveedor. Your primary care clinician is listed as Rory Oneil. If you have any questions after today's visit, please call [phone].

## 2017-01-30 ENCOUNTER — HOSPITAL ENCOUNTER (OUTPATIENT)
Dept: PERINATAL CARE | Age: 32
Discharge: HOME | End: 2017-01-30
Attending: OBSTETRICS & GYNECOLOGY
Payer: SUBSIDIZED

## 2017-01-30 PROCEDURE — 76816 OB US FOLLOW-UP PER FETUS: CPT | Performed by: OBSTETRICS & GYNECOLOGY

## 2017-02-06 ENCOUNTER — ROUTINE PRENATAL (OUTPATIENT)
Dept: FAMILY MEDICINE CLINIC | Age: 32
End: 2017-02-06

## 2017-02-06 VITALS
HEART RATE: 80 BPM | TEMPERATURE: 98.1 F | RESPIRATION RATE: 16 BRPM | DIASTOLIC BLOOD PRESSURE: 61 MMHG | WEIGHT: 196 LBS | SYSTOLIC BLOOD PRESSURE: 99 MMHG | BODY MASS INDEX: 33.46 KG/M2 | HEIGHT: 64 IN | OXYGEN SATURATION: 99 %

## 2017-02-06 DIAGNOSIS — O09.893 SUPERVISION OF OTHER HIGH RISK PREGNANCIES, THIRD TRIMESTER: Primary | ICD-10-CM

## 2017-02-06 DIAGNOSIS — Z86.19 HISTORY OF HERPES GENITALIS: ICD-10-CM

## 2017-02-06 LAB
BILIRUB UR QL STRIP: NEGATIVE
GLUCOSE UR-MCNC: NEGATIVE MG/DL
KETONES P FAST UR STRIP-MCNC: NEGATIVE MG/DL
PH UR STRIP: 6 [PH] (ref 4.6–8)
PROT UR QL STRIP: NEGATIVE MG/DL
SP GR UR STRIP: 1.03 (ref 1–1.03)
UA UROBILINOGEN AMB POC: NORMAL (ref 0.2–1)
URINALYSIS CLARITY POC: CLEAR
URINALYSIS COLOR POC: YELLOW
URINE BLOOD POC: NEGATIVE
URINE LEUKOCYTES POC: NORMAL
URINE NITRITES POC: NEGATIVE

## 2017-02-06 RX ORDER — ACYCLOVIR 400 MG/1
400 TABLET ORAL 3 TIMES DAILY
Qty: 90 TAB | Refills: 0 | Status: SHIPPED | OUTPATIENT
Start: 2017-02-06 | End: 2017-03-02

## 2017-02-06 NOTE — PROGRESS NOTES
RETURN OB VISIT SUMMARY    Subjective:   She has no unusual complaints and denies any vb, lof, ctxs, and notes good fetal movement. S/p MFM ultrasound with normal evaluation. rec f/u as clinically indicated    Objective:   Physical Exam:  ABDOMEN: fundus soft, nontender 38 cm and FHT present @ 130s bpm  SVE: /-3  Vertex on leopolds. GBS done  See prenatal flowsheet and physical exam section    Assessment/Plan:   Normal pregnancy at 36w4d  ED warnings. RTC 1 wk  GBS done   testing completed.  s/p  x 3  Prev Gc/Ch all negative. Routine Prenatal care.

## 2017-02-06 NOTE — PROGRESS NOTES
Chief Complaint   Patient presents with    Routine Prenatal Visit        36w & 4d     1. Have you been to the ER, urgent care clinic since your last visit? Hospitalized since your last visit? No    2. Have you seen or consulted any other health care providers outside of the [address] [place] [address] since your last visit? Include any pap smears or colon screening. No     Pt denies any cramping, bleeding or leakage of fluid. + fetal movement.

## 2017-02-06 NOTE — PATIENT INSTRUCTIONS
Semanas 34 a 36 de rodriguez embarazo: Instrucciones de cuidado - [ Laqueta Feeler 34 to 39 of Your Pregnancy: Care Instructions ]  Instrucciones de cuidado    A estas [place], rodriguez bebé y rodriguez abdomen habrán crecido considerablemente. Quyen es Lonnie de mundo a milan. En un embarazo a término se puede mundo a [employer] 40 y 43. Los pulmones de rodriguez bebé están quyen listos para respirar aire. Los huesos de la pepe de rodriguez bebé ahora son bastante firmes kathryn para protegerla parag se mantienen lo suficientemente blandos kathryn para moverse al atravesar el canal de [place]. Es posible que sienta entusiasmo, margo, ansiedad o miedo. Quizá se pregunte cómo se dará cuenta de si está en trabajo de parto o qué esperar en carlos momento. Trate de ser flexible con dominique expectativas respecto del nacimiento. Dado que cada nacimiento es diferente, no hay manera de saber exactamente cómo será rodriguez parto. Esta hoja de cuidados la ayudará a saber qué esperar y cómo prepararse. Le podría facilitar el parto. Si todavía no le mon aplicado la vacuna Tdap (tétanos, difteria y tos [place]) mine raissa [facility], hable con rodriguez médico acerca de aplicársela. Jerelene Riser a proteger a rodriguez recién nacido contra la infección por tos ferina. En la semana 36, a la mayoría de las mujeres se les hace sima prueba de estreptococos del grace B (GBS, por dominique siglas en inglés). Los estreptococos del grace B son bacterias comunes que pueden vivir en la vagina y el recto. Pueden hacer que rodriguez bebé se enferme después del parto. Si el resultado es positivo, usted recibirá antibióticos mine el trabajo de [place]. Los medicamentos evitarán que rodriguez bebé contraiga las bacterias. La atención de seguimiento es sima parte clave de rodriguez tratamiento y seguridad. Asegúrese de hacer y acudir a todas las citas, y llame a rodriguez médico si está teniendo problemas. También es sima buena idea saber los resultados de los exámenes y mantener sima lista de los medicamentos que navin.   ¿Cómo puede cuidarse en el hogar? Aprenda sobre las alternativas para aliviar el dolor  · El dolor se manifiesta de modo diferente en cada moiz. Hable con rodriguez médico acerca de dominique sentimientos sobre el dolor. · Puede elegir entre varias formas de aliviar el dolor. Estas incluyen medicamentos o técnicas de respiración, así katrhyn medidas para estar cómoda. Usted puede utilizar más de Luwana Pucker opción. · Si elige un analgésico (medicamento para el dolor) mine el trabajo de [place], hable con rodriguez médico acerca de dominique opciones. Algunos medicamentos reducen la ansiedad y [facility] a aliviar parte del dolor. Otros adormecen la parte inferior del cuerpo para que no sienta dolor. · Asegúrese de decirle a rodriguez médico acerca de rodriguez elección de analgésico antes de empezar el trabajo de parto o muy temprano en el [place]. Es posible que pueda cambiar de parecer a medida que avanza el [place]. · Gertrude vez se duerme a sima moiz con medicamentos administrados a través de sima máscara o por vía intravenosa (IV). Trabajo de parto y [place]  · La primera etapa del [place] de parto se divide en zuleima fases: Elie Brien y de transición. ¨ La mayoría de las mujeres experimentan la fase latente del [place] de parto en dominique hogares. Usted puede [employer] o descansar, comer refrigerios livianos, beber líquidos eduardo y comenzar a contar las contracciones. ¨ Cuando advierta que se le vuelve difícil hablar mine sima contracción, es posible que esté por pasar a la fase activa. Mine la fase Junella Yamilka, debería ir al hospital si no está allí aún. ¨ Usted está en la fase activa cuando tiene contracciones cada 3 o 4 minutos y rivera alrededor de 60 segundos. El aga uterino comienza a abrirse con Ltanya Mule. ¨ Si se le rompe la sandeep, las contracciones serán más intensas y más frecuentes. ¨ Mine la fase de transición, el aga uterino se estira y las contracciones se producen con Ltanya Mule.   ¨ Quizá tenga deseos de pujar, sin embargo es posible que el aga uterino aún no esté preparado. El médico le dirá cuándo pujar. · La segunda etapa comienza cuando el aga uterino se abre por completo y usted está lista para pujar. ¨ Las contracciones son muy intensas a fin de empujar al bebé por el canal de parto. ¨ Sentirá la necesidad de pujar. Podría sentir kathryn si tuviera ganas de evacuar el intestino. ¨ Quizás la entrenen a [place] 41 contracciones. Estas contracciones serán muy intensas parag no ocurrirán con tanta frecuencia. Puede descansar un poco entre contracciones. ¨ Es posible que esté sensible e irritable. Es posible que no se dé cuenta de lo que pasa a rodriguez alrededor. ¨ Un último esfuerzo y habrá nacido rodriguez bebé. · La tercera etapa ocurre cuando con unas cuantas contracciones más se expulsa la placenta. [place] puede durar 30 minutos o menos. · La cuarta etapa es la de recuperación. Es posible que se sienta abrumada con las emociones y exhausta parag alerta. Stacie es un buen momento para comenzar el amamantamiento. ¿Dónde puede encontrar más información en inglés? Eduardo Cruz a http://greg-kelvin.info/. Genevive Headings L397 en la búsqueda para aprender más acerca de \"Semanas 34 a 39 de rodriguez embarazo: Instrucciones de cuidado - [ Ardeen Scooter 34 to 39 of Your Pregnancy: Care Instructions ]. \"  Revisado: 30 cruz, 2016  Versión del contenido: 11.1  © 2914-1494 [employer]. Las instrucciones de cuidado fueron adaptadas bajo licencia por [employer]ctions (which disclaims liability or warranty for this information). Si usted tiene [place] afección médica o sobre estas instrucciones, siempre pregunte a rodriguez profesional de ladan. [employer] niega toda garantía o responsabilidad por rodriguez uso de esta información. Precauciones en el embarazo:  Instrucciones de cuidado - [ Pregnancy Precautions: Care Instructions ]  Instrucciones de cuidado  No hay sima manera sanchez de prevenir el trabajo de parto antes de la fecha esperada (trabajo de parto prematuro) o de prevenir la mayoría de otros problemas en el [facility]. Donnell hay cosas que puede hacer para aumentar las probabilidades de tener un embarazo saludable. Vaya a dominique citas, siga los consejos de rodriguez médico y cuídese. Coma rolf y alfredito ejercicio (si rodriguez médico lo permite). Y asegúrese de alejandro abundante agua. La atención de seguimiento es sima parte clave de rodriguez tratamiento y seguridad. Asegúrese de hacer y acudir a todas las citas, y llame a rodriguez médico si está teniendo problemas. También es sima buena idea saber los resultados de los exámenes y mantener sima lista de los medicamentos que navin. ¿Cómo puede cuidarse en el hogar? · Asegúrese de asistir a las citas prenatales. Rodriguez médico le tomará la presión arterial en cada consulta. Rodriguez médico también comprobará si tiene proteínas en rodriguez orina. Tanto la presión arterial deepthi kathryn la presencia de proteínas en la orina son señales de preeclampsia. Esta afección puede ser peligrosa tanto para usted kathryn para rodriguez bebé. · Nikki abundantes líquidos, suficientes para que rodriguez orina sea de color amarillo saji o transparente kathryn el agua. La deshidratación puede causar contracciones. Si tiene [employer], el corazón o el hígado y tiene que [place] líquidos, hable con rodriguez médico antes de aumentar rodriguez consumo. · Notifique a rodriguez médico de inmediato si presenta cualquier síntoma de infección, tales kathryn:  ¨ Ardor cuando orina. ¨ Flujo con mal olor de la vagina. ¨ Comezón en la vagina. ¨ Fiebre sin explicación. ¨ Dolor o sensibilidad inusual en el útero o la parte baja del abdomen. · Aliméntese en forma equilibrada. Incluya muchos alimentos que elpidio ricos en calcio y titus. ¨ Entre los alimentos ricos en calcio se incluyen la [employer], el queso, el yogur, Aicha Torey y el brócoli.   ¨ Entre los alimentos ricos en titus se incluyen las kasi martin, los [place], las aves, los [place], los frijoles, las uvas pasas, [place] pan de grano integral y las verduras de hojas verdes. · No fume. Si necesita ayuda para dejar de fumar, hable con rodriguez médico sobre programas y medicamentos para dejar de fumar. Estos pueden aumentar dominique probabilidades de dejar el hábito para siempre. · No david alcohol ni use drogas ilegales. · Siga las instrucciones de rodriguez médico acerca de la [place]. Rodriguez médico le dirá cuánto ejercicio puede hacer. · Pregúntele a rodriguez médico si puede tener [employer]. Si usted está en riesgo de tener trabajo de [place], rodriguez médico podría pedirle que no tenga relaciones sexuales. · [place] precauciones para prevenir las caídas. Vanessa el embarazo las articulaciones están más sueltas y se tiene menos equilibrio. Los deportes tales kathryn el ciclismo, el esquí o el patinaje en línea pueden aumentar el riesgo de caídas. Y no monte a derrick, vira en motocicleta, alfredito clavados, alfredito esquí acuático, bucee, ni salte en paracaídas mientras está embarazada. · Evite calentarse demasiado. No use saunas ni bañeras de hidromasaje. Evite la exposición al sol en climas calientes por mucho tiempo. [place] acetaminofén (Tylenol) para bajar sima fiebre deepthi. · No tome medicamentos de venta shai, productos herbarios ni suplementos sin hablar yuni con rodriguez médico o farmacéutico.  ¿Cuándo debe pedir ayuda? Llame al 911 en cualquier momento que considere que necesita atención de [place]. Por ejemplo, llame si:  · Se desmayó (perdió el conocimiento). · Tiene sangrado vaginal intenso. · Tiene dolor intenso en el vientre o la pelvis. · Le sale abundante líquido o gotea de la vagina y sabe o cristal que el cordón umbilical se está saliendo a rodriguez vagina. Si esto sucede, arrodíllese de inmediato, de manuel forma que dominique nalgas estén más altas que rodriguez pepe. [place] disminuirá la presión sobre el cordón umbilical hasta que llegue la [facility].   Llame a rodriguez médico ahora mismo o busque atención médica inmediata si:  · Tiene señales de preeclampsia, tales kathryn:  ¨ Se le hinchan de manera repentina la felix, las caroline o los pies. ¨ Problemas nuevos con la visión (kathryn oscurecimiento de la visión o visión borrosa). ¨ Dolor de pepe intenso. · Tiene cualquier sangrado vaginal.  · Tiene dolor abdominal o cólicos. · Tiene fiebre. · Ha tenido contracciones regulares (con o sin dolor) por [place] Mates. [place] significa que tiene 8 o más contracciones en 1 hora o que tiene 4 contracciones o más en 20 minutos después de [place] de posición y alejandro líquidos. · Tiene sima pérdida repentina de líquido por la vagina. · Tiene dolor en la parte baja de la espalda o presión en la pelvis que no desaparece. · Nota que rodriguez bebé ha dejado de moverse o se mueve mucho menos de lo normal.  Preste especial atención a los cambios en rodriguez ladan y asegúrese de comunicarse con rodriguez médico si tiene algún problema. ¿Dónde puede encontrar más información en inglés? Toya Imani a http://greg-kelvin.info/. Lyman Clare E844 en la búsqueda para aprender más acerca de \"Precauciones en el embarazo: Instrucciones de cuidado - [ Pregnancy Precautions: Care Instructions ]. \"  Revisado: 30 [place], 2016  Versión del contenido: 11.1  © 1380-0161 Healthwise, Incorporated. Las instrucciones de cuidado fueron adaptadas bajo licencia por Good Help Connections (which disclaims liability or warranty for this information). Si usted tiene [place] afección médica o sobre estas instrucciones, siempre pregunte a rodriguez profesional de ladan. Healthwise, Incorporated niega toda garantía o responsabilidad por rodriguez uso de esta información.

## 2017-02-08 LAB — GP B STREP DNA SPEC QL NAA+PROBE: POSITIVE

## 2017-02-15 ENCOUNTER — ROUTINE PRENATAL (OUTPATIENT)
Dept: FAMILY MEDICINE CLINIC | Age: 32
End: 2017-02-15

## 2017-02-15 VITALS
WEIGHT: 196 LBS | OXYGEN SATURATION: 100 % | HEIGHT: 64 IN | TEMPERATURE: 98.2 F | DIASTOLIC BLOOD PRESSURE: 61 MMHG | RESPIRATION RATE: 16 BRPM | SYSTOLIC BLOOD PRESSURE: 92 MMHG | HEART RATE: 85 BPM | BODY MASS INDEX: 33.46 KG/M2

## 2017-02-15 DIAGNOSIS — Z34.83 ENCOUNTER FOR SUPERVISION OF OTHER NORMAL PREGNANCY, THIRD TRIMESTER: Primary | ICD-10-CM

## 2017-02-15 DIAGNOSIS — B95.1 POSITIVE GBS TEST: ICD-10-CM

## 2017-02-15 DIAGNOSIS — Z86.19 HISTORY OF HERPES GENITALIS: ICD-10-CM

## 2017-02-15 LAB
BILIRUB UR QL STRIP: NEGATIVE
GLUCOSE UR-MCNC: NEGATIVE MG/DL
KETONES P FAST UR STRIP-MCNC: NEGATIVE MG/DL
PH UR STRIP: 6.5 [PH] (ref 4.6–8)
PROT UR QL STRIP: NEGATIVE MG/DL
SP GR UR STRIP: 1.02 (ref 1–1.03)
UA UROBILINOGEN AMB POC: NORMAL (ref 0.2–1)
URINALYSIS CLARITY POC: CLEAR
URINALYSIS COLOR POC: YELLOW
URINE BLOOD POC: NEGATIVE
URINE LEUKOCYTES POC: NEGATIVE
URINE NITRITES POC: NEGATIVE

## 2017-02-15 NOTE — PATIENT INSTRUCTIONS
Semana 40 de rodriguez embarazo: Instrucciones de cuidado - [ Week 40 of Your Pregnancy: Care Instructions ]  Instrucciones de cuidado    Usted está cerca del final de rodriguez embarazo, y probablemente esté bastante incómoda. Puede ser más difícil caminar. Acostarse probablemente tampoco sea cómodo. Podría tener dificultad para dormir o para permanecer dormida. La mayoría de las mujeres ezekiel a milan entre las 40 y 43 semanas. Stacie es un buen momento para pensar en preparar un maletín para el hospital con los artículos que necesitará. Entonces estará lista para cuando comience el [place]. La atención de seguimiento es sima parte clave de rodriguez tratamiento y seguridad. Asegúrese de hacer y acudir a todas las citas, y llame a rodriguez médico si está teniendo problemas. También es sima buena idea saber los resultados de dominique exámenes y mantener sima lista de los medicamentos que navin. ¿Cómo puede cuidarse en el hogar? Aprenda sobre el amamantamiento  · El amamantamiento es lo mejor para rodriguez bebé y [place] es mallory para usted. · La leche materna tiene anticuerpos que ayudan al bebé a combatir las infecciones. · Las madres que amamantan suelen bajar de peso más rápidamente, debido a que elaborar leche quema calorías. · Informarse acerca de las mejores maneras de sostener a rodriguez bebé le facilitará el amamantamiento. · Deje que rodriguez deonte bañe y [employer] pañales del bebé para que no se sienta excluida. Acurrúquense juntos cuando amamante a rodriguez bebé. · Es posible que desee aprender a usar un sacaleches y guardar rodriguez [employer]. · Si elige alimentar a rodriguez bebé con biberón, hágalo de la [employer] resulte más parecida al amamantamiento para que pueda establecer un vínculo con rodriguez bebé. Siempre sostenga al bebé y el biberón. No apoye el biberón ni deje que rodriguez bebé se quede dormido con él. Aprenda sobre el llanto  · Es normal que los bebés lloren de 1 a 3 horas al día. Algunos lloran más, otros menos.   · Los bebés no lloran para causarle molestias ni porque usted sea [facility]. · Llorar es la forma de comunicarse que tiene el bebé. Rodriguez bebé puede Carole Grumman o gases, necesitar un cambio de pañal, sentir frío o calor, sentirse solo o tenso. A veces, los bebés lloran por motivos desconocidos. · Si usted responde a las necesidades de rodriguez bebé, raissa aprenderá a confiar en usted. · Intente mantener la calma cuando rodriguez bebé llore. Rodriguez bebé se puede sentir más molesto si siente que usted [place]. Sepa cómo cuidar a rodriguez recién nacido  · El muñón del cordón umbilical de rodriguez bebé se caerá solo, por lo general entre las semanas 1 y 2. Para cuidar la pieter del cordón umbilical de rodriguez bebé:  ¨ Limpie la pieter de la parte inferior del cordón umbilical 2 o 3 veces al día. ¨ Ponga especial atención en la pieter en donde el cordón se fija a la piel. ¨ Mantenga el pañal doblado debajo del cordón. ¨ Utilice sima toallita húmeda o algodón para darle un baño de esponja a rodriguez bebé hasta que se le haya caído el muñón. · La primera evacuación intestinal oscura de rodriguez bebé se conoce kathryn meconio. Después del meconio, el bebé tendrá dominique propios hábitos de evacuación intestinal.  ¨ Algunos bebés, especialmente aquellos que se alimentan con [address], tienen varias evacuaciones al día. Otros tienen [employer] al día, o sima cada 2 o 3 días. ¨ Los bebés que son amamantados a menudo tienen evacuaciones sueltas amarillentas. Los bebés que se alimentan con leche de fórmula evacuan heces más sólidas. ¨ Si rodriguez bebé tiene evacuaciones kathryn bolitas pequeñas, está estreñido. Después de 2 favio de estreñimiento, llame al médico de rodriguez bebé. · Si rodriguez bebé va a ser [place]ita [place], usted puede cuidarlo en el hogar. ¨ Enjuáguele delicadamente el pene con agua tibia cada vez que le cambie los pañales. No intente retirar la membrana que se forma sobre el pene. Esta membrana desaparecerá por sí pedro. Séquele la pieter con toques suaves de toalla.   ¨ Coloque [employer] a base de petróleo, kathryn vaselina, en la pieter del pañal que tendrá contacto con el pene de oakley bebé. [place] evitará que el pañal se le pegue al bebé. ¨ Pregúntele al médico acerca de darle acetaminofén (Tylenol) a oakley bebé para el dolor. ¿Dónde puede encontrar más información en inglés? Edilberto Funez a http://greg-kelvin.info/. Escriba O880 en la búsqueda para aprender más acerca de \"Semana 40 de oakley embarazo: Instrucciones de cuidado - [ Week 40 of Your Pregnancy: Care Instructions ]. \"  Revisado: 30 cruz, 2016  Versión del contenido: 11.1  © 3335-8258 Healthwise, Incorporated. Las instrucciones de cuidado fueron adaptadas bajo licencia por [employer] (which disclaims liability or warranty for this information). Si usted tiene [place] afección médica o sobre estas instrucciones, siempre pregunte a oakley profesional de ladan. Healthwise, Incorporated niega toda garantía o responsabilidad por oakley uso de esta información. Precauciones en el embarazo: Instrucciones de cuidado - [ Pregnancy Precautions: Care Instructions ]  Instrucciones de cuidado  No hay sima manera sanchez de prevenir el trabajo de parto antes de la fecha esperada (trabajo de parto prematuro) o de prevenir la mayoría de otros problemas en el [facility]. Donnell hay cosas que puede hacer para aumentar las probabilidades de tener un embarazo saludable. Vaya a dominique citas, siga los consejos de oakley médico y cuídese. Coma rolf y alfrdeito ejercicio (si oakley médico lo permite). Y asegúrese de alejandro abundante agua. La atención de seguimiento es sima parte clave de oakley tratamiento y seguridad. Asegúrese de hacer y acudir a todas las citas, y llame a oakley médico si está teniendo problemas. También es sima buena idea saber los resultados de los exámenes y mantener sima lista de los medicamentos que navin. ¿Cómo puede cuidarse en el hogar? · Asegúrese de asistir a las citas prenatales. Oakley médico le tomará la presión arterial en cada consulta.  [address] comprobará si tiene proteínas en oakley orina. Tanto la presión arterial deepthi kathryn la presencia de proteínas en la orina son señales de preeclampsia. Esta afección puede ser peligrosa tanto para usted kathryn para oakely bebé. · [place] abundantes líquidos, suficientes para que oakley orina sea de color amarillo saji o transparente kathryn el agua. La deshidratación puede causar contracciones. Si tiene [employer], el corazón o el hígado y tiene que [place]'s líquidos, hable con oakley médico antes de aumentar oakley consumo. · Notifique a oakley médico de inmediato si presenta cualquier síntoma de infección, tales kathryn:  ¨ Ardor cuando orina. ¨ Flujo con mal olor de la vagina. ¨ Comezón en la vagina. ¨ Fiebre sin explicación. ¨ Dolor o sensibilidad inusual en el útero o la parte baja del abdomen. · Aliméntese en forma equilibrada. Incluya muchos alimentos que elpidio ricos en calcio y titus. ¨ Entre los alimentos ricos en calcio se incluyen la [place], el queso, el yogur, [place] y el brócoli. ¨ Entre los alimentos ricos en titus se incluyen las kasi martin, los [place], las aves, los [place], los frijoles, las uvas pasas, el pan de grano integral y las verduras de hojas verdes. · No fume. Si necesita ayuda para dejar de fumar, hable con oakley médico sobre programas y medicamentos para dejar de fumar. Estos pueden aumentar dominique probabilidades de dejar el hábito para siempre. · No david alcohol ni use drogas ilegales. · Siga las instrucciones de oakley médico acerca de la [place]. Oakley médico le dirá cuánto ejercicio puede hacer. · Pregúntele a oakley médico si puede tener [address]. Si usted está en riesgo de tener trabajo de [place], oakley médico podría pedirle que no tenga relaciones sexuales. · [place] precauciones para prevenir las caídas. Vanessa el embarazo las articulaciones están más sueltas y se tiene menos equilibrio.  Los deportes tales kathryn el ciclismo, el esquí o el patinaje en línea pueden aumentar el riesgo de caídas. Y no monte a derrick, vira en motocicleta, alfredito clavados, alfredito esquí acuático, bucee, ni salte en paracaídas mientras está embarazada. · Evite calentarse demasiado. No use saunas ni bañeras de hidromasaje. Evite la exposición al sol en climas calientes por mucho tiempo. [place] acetaminofén (Tylenol) para bajar sima fiebre deepthi. · No tome medicamentos de venta shai, productos herbarios ni suplementos sin hablar yuni con rodriguez médico o farmacéutico.  ¿Cuándo debe pedir ayuda? Llame al 911 en cualquier momento que considere que necesita atención de [place]. Por ejemplo, llame si:  · Se desmayó (perdió el conocimiento). · Tiene sangrado vaginal intenso. · Tiene dolor intenso en el vientre o la pelvis. · Le sale abundante líquido o gotea de la vagina y sabe o cristal que el cordón umbilical se está saliendo a rodriguez vagina. Si esto sucede, arrodíllese de inmediato, de manuel forma que dominique nalgas estén más altas que rodriguez pepe. [place] disminuirá la presión sobre el cordón umbilical hasta que llegue la [facility]. Llame a rodriguez médico ahora mismo o busque atención médica inmediata si:  · Tiene señales de preeclampsia, tales kathryn:  ¨ Se le hinchan de manera repentina la felix, las caroline o los pies. ¨ Problemas nuevos con la visión (kathryn oscurecimiento de la visión o visión borrosa). ¨ Dolor de pepe intenso. · Tiene cualquier sangrado vaginal.  · Tiene dolor abdominal o cólicos. · Tiene fiebre. · Ha tenido contracciones regulares (con o sin dolor) por Cinthya Belmond. [place] significa que tiene 8 o más contracciones en 1 hora o que tiene 4 contracciones o más en 20 minutos después de [place] de posición y alejandro líquidos. · Tiene sima pérdida repentina de líquido por la vagina. · Tiene dolor en la parte baja de la espalda o presión en la pelvis que no desaparece.   · Nota que rodriguez bebé ha dejado de moverse o se mueve mucho menos de lo normal.  Preste especial atención a los cambios en rodriguez ladan y asegúrese de comunicarse con rodriguez médico si tiene algún problema. ¿Dónde puede encontrar más información en inglés? Alecia Kearneyet a http://greg-kelvin.info/. Clay Ariana U118 en la búsqueda para aprender más acerca de \"Precauciones en el embarazo: Instrucciones de cuidado - [ Pregnancy Precautions: Care Instructions ]. \"  Revisado: 30 mayo, 2016  Versión del contenido: 11.1  © 4597-2361 Healthwise, Incorporated. Las instrucciones de cuidado fueron adaptadas bajo licencia por Good Help Connections (which disclaims liability or warranty for this information). Si usted tiene [place] afección médica o sobre estas instrucciones, siempre pregunte a rodriguez profesional de ladan. Healthwise, Incorporated niega toda garantía o responsabilidad por rodriguez uso de esta información.

## 2017-02-15 NOTE — PROGRESS NOTES
Chief Complaint   Patient presents with    Routine Prenatal Visit      37w& 6d     1. Have you been to the ER, urgent care clinic since your last visit? Hospitalized since your last visit? No    2. Have you seen or consulted any other health care providers outside of the [address] [place] [address] since your last visit? Include any pap smears or colon screening. No     Pt denies any cramping, bleeding or leakage of fluid. + fetal movement.

## 2017-02-15 NOTE — PROGRESS NOTES
RETURN OB VISIT SUMMARY    Subjective:   She has no unusual complaints and denies any vb, lof, ctxs, and notes good fetal movement. Objective:   Physical Exam:  ABDOMEN: obese, fundus soft, nontender 38 weeks size and FHT present @ 130s bpm  Vertex on leopolds. See prenatal flowsheet and physical exam section    Assessment/Plan:   Supervision of high risk pregnancy at 37w6d due to hx of  demise with normal deliveries. Normal  testing. RTC 1 wk. ED warnings. Hx of genital herpes. Currently asymptomatic. Acyclovir for ppx. Routine Prenatal care.

## 2017-02-15 NOTE — MR AVS SNAPSHOT
Visit Information Livia Tinsley Personal Médico Departamento Teléfono del Dep. Número de visita 2/15/2017  1:30 PM Alycia Burger, [facility] [phone] [id number] Follow-up Instructions Return in about 1 week (around 2/22/2017). Your Appointments 2/22/2017  2:15 PM  
OB VISIT with Alycia Burger MD  
[facility]) Appt Note: routine prenatal  
 [address] [employer] [address]  
[phone]  
  
   
 [facility] [place] [facility] [place] [address] Upcoming Health Maintenance Date Due  
 PAP AKA CERVICAL CYTOLOGY 6/25/2017 DTaP/Tdap/Td series (2 - Td) 12/7/2026 Alergias  Review Complete El: 2/15/2017 Por: MD Braulio Woods del:  2/15/2017 No Known Allergies Vacunas actuales Revisadas el:  12/7/2016 Janet Calamity Influenza Vaccine (Quad) PF 12/7/2016, 12/26/2014 Tdap 12/7/2016, 12/26/2014 No revisadas esta visita You Were Diagnosed With   
  
 Anisa Pierce Encounter for supervision of other normal pregnancy, third trimester    -  Primary ICD-10-CM: Z34.83 ICD-9-CM: V22.1 History of herpes genitalis     ICD-10-CM: Z86.19 ICD-9-CM: V12.09 Positive GBS test     ICD-10-CM: B95.1 ICD-9-CM: 041.02 Partes vitales PS Pulso Temperatura Resp [place] ( percentil de crecimiento) Peso (percentil de crecimiento) 92/61 (BP 1 Location: Right arm, BP Patient Position: Sitting) 85 98.2 °F (36.8 °C) (Oral) 16 5' 4\" (1.626 m) 196 lb (88.9 kg) LMP (última grady) SpO2 BMI (IMC) Estado obstétrico Estatus de tabaquísmo 05/26/2016 100% 33.64 kg/m2 Pregnant Never Smoker Historial de signos vitales BMI and BSA Data Body Mass Index Body Surface Area  
 33.64 kg/m 2 2 m 2 [facility] Name Phone [facility] [place], [address] [phone] Rodriguez lista de medicamentos actualizada Lista actualizada el: 2/15/17  1:48 PM.  Beatriz Gray use rodriguez lista de medicamentos más reciente. acyclovir 400 mg tablet También conocido kathryn:  ZOVIRAX Take 1 Tab by mouth three (3) times daily for 30 days. PRENATAL PO Take  by mouth.  
  
 sodium chloride 0.65 % nasal spray También conocido kathryn:  SALINE NASAL  
1 spray by Both Nostrils route as needed for Congestion. Hicimos lo siguiente AMB POC URINALYSIS DIP STICK AUTO W/O MICRO [71589 CPT(R)] Instrucciones de seguimiento Return in about 1 week (around 2/22/2017). Instrucciones para el Paciente Semana 40 de rodriguez embarazo: Instrucciones de cuidado - [ Week 40 of Your Pregnancy: Care Instructions ] Instrucciones de cuidado Usted está cerca del final de rodriguez embarazo, y probablemente esté bastante incómoda. Puede ser más difícil caminar. Acostarse probablemente tampoco sea cómodo. Podría tener dificultad para dormir o para permanecer dormida. La mayoría de las mujeres ezekiel a milan entre las 40 y 43 semanas. Stacie es un buen momento para pensar en preparar un maletín para el hospital con los artículos que necesitará. Entonces estará lista para cuando comience el [place]. La atención de seguimiento es sima parte clave de rodriguez tratamiento y seguridad. Asegúrese de hacer y acudir a todas las citas, y llame a rodriguez médico si está teniendo problemas. También es sima buena idea saber los resultados de dominique exámenes y mantener sima lista de los medicamentos que navin. Cómo puede cuidarse en el hogar? Aprenda sobre el amamantamiento · El amamantamiento es lo mejor para rodriguez bebé y Les Diesel es mallory para usted. · La leche materna tiene anticuerpos que ayudan al bebé a combatir las infecciones. · Las madres que amamantan suelen bajar de peso más rápidamente, debido a que elaborar leche quema calorías. · Informarse acerca de las mejores maneras de sostener a rodriguez bebé le facilitará el amamantamiento. · Deje que rodriguez deonte bañe y [employer] pañales del bebé para que no se sienta excluida. Acurrúquense juntos cuando amamante a rodriguez bebé. · Es posible que desee aprender a usar un sacaleches y guardar rodriguez [place]. · Si elige alimentar a rodriguez bebé con biberón, hágalo de la [employer] resulte más parecida al amamantamiento para que pueda establecer un vínculo con rodriguez bebé. Siempre sostenga al bebé y el biberón. No apoye el biberón ni deje que rodriguez bebé se quede dormido con él. Feli Avers · Es normal que los bebés lloren de 1 a 3 horas al día. Algunos lloran más, otros menos. · Los bebés no lloran para causarle molestias ni porque usted sea [place]. · Llorar es la forma de comunicarse que tiene el bebé. Rodriguez bebé puede [place] [employer] o gases, necesitar un cambio de pañal, sentir frío o calor, sentirse solo o tenso. A veces, los bebés lloran por motivos desconocidos. · Si usted responde a las necesidades de rodriguez bebé, raissa aprenderá a confiar en usted. · Intente mantener la calma cuando rodriguez bebé llore. Rodriguez bebé se puede sentir más molesto si siente que usted [place]. Sepa cómo cuidar a rodriguez recién nacido · El muñón del cordón umbilical de rodriguez bebé se caerá solo, por lo general entre las semanas 1 y 2. Para cuidar la pieter del cordón umbilical de rodriguez bebé: ¨ Limpie la pieter de la parte inferior del cordón umbilical 2 o 3 veces al día. ¨ Ponga especial atención en la pieter en donde el cordón se fija a la piel. ¨ Mantenga el pañal doblado debajo del cordón. ¨ Utilice sima toallita húmeda o algodón para darle un baño de esponja a rodriguez bebé hasta que se le haya caído el muñón. · La primera evacuación intestinal oscura de rodriguez bebé se conoce kathryn meconio.  Después del meconio, el bebé tendrá dominique propios hábitos de evacuación intestinal. 
¨ Algunos bebés, especialmente aquellos que se alimentan con [place] materna, tienen varias evacuaciones al día. Otros tienen [employer] al día, o sima cada 2 o 3 días. ¨ Los bebés que son amamantados a menudo tienen evacuaciones sueltas amarillentas. Los bebés que se alimentan con leche de fórmula evacuan heces más sólidas. ¨ Si rodriguez bebé tiene evacuaciones kathryn bolitas pequeñas, está estreñido. Después de 2 favio de estreñimiento, llame al médico de rodriguez bebé. · Si rodriguez bebé va a ser Mely Frohlich, usted puede cuidarlo en el hogar. ¨ Enjuáguele delicadamente el pene con agua tibia cada vez que le cambie los pañales. No intente retirar la membrana que se forma sobre el pene. Esta membrana desaparecerá por sí pedro. Séquele la pieter con toques suaves de toalla. ¨ Coloque sima pomada a base de petróleo, kathryn vaselina, en la pieter del pañal que tendrá contacto con el pene de rodriguez bebé. [place] evitará que el pañal se le pegue al bebé. ¨ Pregúntele al médico acerca de darle acetaminofén (Tylenol) a rodriguez bebé para el dolor. Dónde puede encontrar más información en inglés? Laurel Burtono a http://greg-kelvin.info/. Escriba F661 en la búsqueda para aprender más acerca de \"Semana 40 de rodriguez embarazo: Instrucciones de cuidado - [ Week 40 of Your Pregnancy: Care Instructions ]. \" 
Revisado: 30 cruz, 2016 Versión del contenido: 11.1 © 1614-7556 [employer], Incorporated. Las instrucciones de cuidado fueron adaptadas bajo licencia por [employer] (which disclaims liability or warranty for this information). Si usted tiene [place] afección médica o sobre estas instrucciones, siempre pregunte a rodriguez profesional de ladan. [facility] niega toda garantía o responsabilidad por rodriguez uso de esta información. Precauciones en el embarazo: Instrucciones de cuidado - [ Pregnancy Precautions: Care Instructions ] Instrucciones de cuidado No hay sima manera sanchez de prevenir el trabajo de parto antes de la fecha esperada (trabajo de parto prematuro) o de prevenir la mayoría de otros problemas en el [facility]. Donnell hay cosas que puede hacer para aumentar las probabilidades de tener un embarazo saludable. Vaya a dominique citas, siga los consejos de oakley médico y cuídese. Coma rolf y alfredito ejercicio (si oakley médico lo permite). Y asegúrese de alejandro abundante agua. La atención de seguimiento es sima parte clave de oakley tratamiento y seguridad. Asegúrese de hacer y acudir a todas las citas, y llame a oakley médico si está teniendo problemas. También es sima buena idea saber los resultados de los exámenes y mantener sima lista de los medicamentos que navin. Cómo puede cuidarse en el hogar? · Asegúrese de asistir a las citas prenatales. [place] médico le tomará la presión arterial en cada consulta. [place] médico también comprobará si tiene proteínas en oakley orina. Tanto la presión arterial deepthi kathryn la presencia de proteínas en la orina son señales de preeclampsia. Esta afección puede ser peligrosa tanto para usted kathryn para oakley bebé. · Nikki abundantes líquidos, suficientes para que oakley orina sea de color amarillo saji o transparente kathryn el agua. La deshidratación puede causar contracciones. Si tiene [employer], el corazón o el hígado y tiene que [place]'s líquidos, hable con oakley médico antes de aumentar oakley consumo. · Notifique a oakley médico de inmediato si presenta cualquier síntoma de infección, tales kathryn: ¨ Ardor cuando orina. ¨ Flujo con mal olor de la vagina. ¨ Comezón en la vagina. ¨ Fiebre sin explicación. ¨ Dolor o sensibilidad inusual en el útero o la parte baja del abdomen. · Aliméntese en forma equilibrada. Incluya muchos alimentos que elpidio ricos en calcio y titus. ¨ Entre los alimentos ricos en calcio se incluyen la [place], el queso, el yogur, Mena Mcgee y el brócoli.  
¨ Entre los alimentos ricos en titus se incluyen las kasi martin, los [place], las aves, los [place], los frijoles, las uvas pasas, el pan de grano integral y las verduras de hojas verdes. · No fume. Si necesita ayuda para dejar de fumar, hable con rodriguez médico sobre programas y medicamentos para dejar de fumar. Estos pueden aumentar dominique probabilidades de dejar el hábito para siempre. · No david alcohol ni use drogas ilegales. · Siga las instrucciones de rodriguez médico acerca de la [place]. Rodriguez médico le dirá cuánto ejercicio puede hacer. · Pregúntele a rodriguez médico si puede tener [employer]. Si usted está en riesgo de tener trabajo de [place], rodriguez médico podría pedirle que no tenga relaciones sexuales. · [place] precauciones para prevenir las caídas. Vanessa el embarazo las articulaciones están más sueltas y se tiene menos equilibrio. Los deportes tales kathryn el ciclismo, el esquí o el patinaje en línea pueden aumentar el riesgo de caídas. Y no monte a derrick, vira en motocicleta, alfredito clavados, alfredito esquí acuático, bucee, ni salte en paracaídas mientras está embarazada. · Evite calentarse demasiado. No use saunas ni bañeras de hidromasaje. Evite la exposición al sol en climas calientes por mucho tiempo. [place] acetaminofén (Tylenol) para bajar isma fiebre deepthi. · No tome medicamentos de venta shai, productos herbarios ni suplementos sin hablar yuni con rodriguez médico o farmacéutico. 

Cuándo debe pedir ayuda? Llame al 911 en cualquier momento que considere que necesita atención de [place]. Por ejemplo, llame si: 
· Se desmayó (perdió el conocimiento). · Tiene sangrado vaginal intenso. · Tiene dolor intenso en el vientre o la pelvis. · Le sale abundante líquido o gotea de la vagina y sabe o cristal que el cordón umbilical se está saliendo a rodriguez vagina. Si esto sucede, arrodíllese de inmediato, de manuel forma que dominique nalgas estén más altas que rodriguez pepe. [place] disminuirá la presión sobre el cordón umbilical hasta que llegue la [facility]. Llame a rodriguez médico ahora mismo o busque atención médica inmediata si: · Tiene señales de preeclampsia, tales kathryn: ¨ Se le hinchan de manera repentina la felix, las caroline o los pies. ¨ Problemas nuevos con la visión (kathryn oscurecimiento de la visión o visión borrosa). ¨ Dolor de pepe intenso. · Tiene cualquier sangrado vaginal. 
· Tiene dolor abdominal o cólicos. · Tiene fiebre. · Ha tenido contracciones regulares (con o sin dolor) por Laurel Finders. [place] significa que tiene 8 o más contracciones en 1 hora o que tiene 4 contracciones o más en 20 minutos después de [place] de posición y alejandro líquidos. · Tiene sima pérdida repentina de líquido por la vagina. · Tiene dolor en la parte baja de la espalda o presión en la pelvis que no desaparece. · Nota que rodriguez bebé ha dejado de moverse o se mueve mucho menos de lo normal. 
Preste especial atención a los cambios en rodriguez ladan y asegúrese de comunicarse con rodriguez médico si tiene algún problema. Dónde puede encontrar más información en inglés? Brianna Cast a http://greg-kelvin.info/. Miya Forth S861 en la búsqueda para aprender más acerca de \"Precauciones en el embarazo: Instrucciones de cuidado - [ Pregnancy Precautions: Care Instructions ]. \" 
Revisado: 30 cruz, 2016 Versión del contenido: 11.1 © 1718-5576 Healthwise, Incorporated. Las instrucciones de cuidado fueron adaptadas bajo licencia por Good Help Connections (which disclaims liability or warranty for this information). Si usted tiene [place] afección médica o sobre estas instrucciones, siempre pregunte a rodriguez profesional de ladan. Healthwise, Incorporated niega toda garantía o responsabilidad por rodriguez uso de esta información. Introducing [facility]! [facility] introduce portal paciente MyChart . Ahora se puede acceder a partes de rodriguez expediente médico, enviar por correo electrónico la oficina de rodriguez médico y solicitar renovaciones de medicamentos en línea. En rodriguez navegador de Internet , [place] a https://[employer]. [employer]. com/[employer] inct Alfredito clic en el usuario por [facility] [place]? Elveria Guadalupe clic aquí en la sesión Thresa Rias. Verá la página de registro [place]. Ingrese rodriguez código de [employer] manuel y kathryn aparece a continuación. Usted no tendrá que [employer] código después de maryuri completado el proceso de registro . Si usted no se inscribe antes de la fecha de caducidad , debe solicitar un nuevo código. · MyChart Código de acceso : [id number] Expires: 4/20/2017  9:48 AM 
 
Ingresa los últimos cuatro dígitos de rodriguez Número de Seguro Social ( xxxx ) y fecha de nacimiento ( dd / mm / aaaa ) kathryn se indica y alfredito clic en Enviar. Usted será llevado a la siguiente página de registro . Crear un ID MyChart . Esta será rodriguez ID de inicio de sesión de MyChart y no puede ser [place] , por lo que pensar en sima que es [place] y fácil de recordar . Crear sima contraseña MyChart . Usted puede cambiar rodriguez contraseña en cualquier momento . Ingrese rodriguez Password Reset de preguntas y [place] . [place] se puede utilizar en un momento posterior si usted olvida rodriguez contraseña. Introduzca rodriguez dirección de correo electrónico . Manuelita Carreon recibirá sima notificación por correo electrónico cuando la nueva información está disponible en MyChart . Jennye Luz clic en Registrarse. Bonnie Funez milagro y descargar porciones de rodriguez expediente médico. 
Alfredito clic en el enlace de descarga del menú Resumen para descargar sima copia portátil de rodriguez información médica . Si tiene [employer] , por favor visite la sección de preguntas frecuentes del sitio web MyChart . Recuerde, MyChart NO es que se utilizará para las necesidades urgentes. Para emergencias médicas , llame al 911 . Ahora disponible en rodriguez iPhone y Android ! Por favor proporcione raissa resumen de la documentación de cuidado a rodriguez próximo proveedor. Your primary care clinician is listed as Reshma Ybarra. If you have any questions after today's visit, please call [phone].

## 2017-02-22 ENCOUNTER — ROUTINE PRENATAL (OUTPATIENT)
Dept: FAMILY MEDICINE CLINIC | Age: 32
End: 2017-02-22

## 2017-02-22 VITALS
TEMPERATURE: 98.1 F | WEIGHT: 200 LBS | HEIGHT: 64 IN | OXYGEN SATURATION: 100 % | DIASTOLIC BLOOD PRESSURE: 71 MMHG | RESPIRATION RATE: 16 BRPM | BODY MASS INDEX: 34.15 KG/M2 | SYSTOLIC BLOOD PRESSURE: 111 MMHG | HEART RATE: 81 BPM

## 2017-02-22 DIAGNOSIS — Z34.82 ENCOUNTER FOR SUPERVISION OF OTHER NORMAL PREGNANCY, SECOND TRIMESTER: Primary | ICD-10-CM

## 2017-02-22 LAB
BILIRUB UR QL STRIP: NEGATIVE
GLUCOSE UR-MCNC: NEGATIVE MG/DL
KETONES P FAST UR STRIP-MCNC: NEGATIVE MG/DL
PH UR STRIP: 7 [PH] (ref 4.6–8)
PROT UR QL STRIP: NEGATIVE MG/DL
SP GR UR STRIP: 1.02 (ref 1–1.03)
UA UROBILINOGEN AMB POC: NORMAL (ref 0.2–1)
URINALYSIS CLARITY POC: CLEAR
URINALYSIS COLOR POC: YELLOW
URINE BLOOD POC: NEGATIVE
URINE LEUKOCYTES POC: NEGATIVE
URINE NITRITES POC: NEGATIVE

## 2017-02-22 NOTE — MR AVS SNAPSHOT
Visit Information Tiara Mcdonough lorena Rachel Personal Médico Departamento Teléfono del Dep. Número de visita 2/22/2017  2:15 PM Mart Canseco, [address] [facility] [phone] [id number] Follow-up Instructions Return in about 1 week (around 3/1/2017), or if symptoms worsen or fail to improve. Upcoming Health Maintenance Date Due  
 PAP AKA CERVICAL CYTOLOGY 6/25/2017 DTaP/Tdap/Td series (2 - Td) 12/7/2026 Alergias  Review Complete El: 2/22/2017 Por: Mart Canseco MD  
 Eddye Every del:  2/22/2017 No Known Allergies Vacunas actuales Revisadas el:  12/7/2016 Delaney Senior Influenza Vaccine (Quad) PF 12/7/2016, 12/26/2014 Tdap 12/7/2016, 12/26/2014 No revisadas esta visita You Were Diagnosed With   
  
 Socorro Lebron Encounter for supervision of other normal pregnancy, second trimester    -  Primary ICD-10-CM: Z34.82 
ICD-9-CM: V22.1 Partes vitales PS  
  
  
  
  
  
 [facility] Name Phone [facility] [id number], [address] [phone] Oak[place] lista de medicamentos actualizada Lista actualizada el: 2/22/17  2:22 PM.  Siempre use oakley lista de medicamentos más reciente. acyclovir 400 mg tablet También conocido kathryn:  ZOVIRAX Take 1 Tab by mouth three (3) times daily for 30 days. PRENATAL PO Take  by mouth.  
  
 sodium chloride 0.65 % nasal spray También conocido kathryn:  SALINE NASAL  
1 spray by Both Nostrils route as needed for Congestion. Hicimos lo siguiente AMB POC URINALYSIS DIP STICK AUTO W/O MICRO [34522 CPT(R)] Instrucciones de seguimiento Return in about 1 week (around 3/1/2017), or if symptoms worsen or fail to improve. Instrucciones para el Paciente Inducción del parto: Instrucciones de cuidado - [ Labor Induction: Care Instructions ] Instrucciones de cuidado Si pasa la fecha de término para el parto y Bindu no comienza por sí mismo, oakley médico podría tratar de inducirlo. Oakley médico podría sugerir el hacer esto por otras razones. Inducir el parto podría ser Lloyd Chaparro buena idea si tiene otro problema médico, kathryn presión arterial deepthi. O podría ser sima buena idea si la placenta ya no puede suministrarle suficiente sustento a oakley bebé. Existen varias medidas de inducir el [place]. · Se podrían usar medicamentos para ablandar el aga uterino y ayudar a adelgazarlo. · Se podrían usar medicamentos para hacer que el útero se contraiga. · Se podría usar un catéter con globo para ayudar a que se max el aga uterino. · Si oakley aga uterino está blando y ligeramente abierto, el médico podría despegar las [employer] o romper el saco amniótico para iniciar o incrementar el [place]. Podría ser necesario que oakley bebé nazca a través de un gi (incisión) en oakley abdomen. [employer] se llama cesárea. Podría realizarse si oakley médico ha usado medicamentos parag el [place] de parto no avanza. Después de tener a oakley bebé, usted no debería tener ningún efecto secundario causado por el medicamento usado para iniciar el [place]. La atención de seguimiento es sima parte clave de oakley tratamiento y seguridad. Asegúrese de hacer y acudir a todas las citas, y llame a oakley médico si está teniendo problemas. También es sima buena idea saber los resultados de los exámenes y mantener sima lista de los medicamentos que navin. Cuándo debe inducirse el parto? · Oakley embarazo se ha pasado de 1 a 2 semanas de la fecha de nacimiento prevista. · Usted tiene un problema médico que podría afectar oakley ladan o la de oakley bebé si el embarazo continúa. [employer] incluye presión arterial deepthi, preeclampsia o diabetes. · La sanedep se rompe parag el parto no comienza. Cuándo debe pedir ayuda?  
Llame al 911 en cualquier momento que considere que necesita atención de urgencia. Por ejemplo, llame si: 
· Se desmayó (perdió el conocimiento). · Tiene sangrado vaginal intenso. · Tiene dolor intenso en el abdomen o la pelvis. · Le sale abundante líquido o gotea de la vagina y sabe o cristal que el cordón umbilical se está saliendo a rodriguez vagina. Si esto sucede, arrodíllese de inmediato, de manuel forma que dominique nalgas estén más altas que rodriguez pepe. [place] disminuirá la presión sobre el cordón umbilical hasta que llegue la [facility]. Llame a rodriguez médico ahora mismo o busque atención médica inmediata si: · Tiene señales de preeclampsia, tales kathryn: ¨ Se le hinchan de manera repentina la felix, las caroline o los pies. ¨ Problemas nuevos con la visión (kathryn oscurecimiento de la visión o visión borrosa). ¨ Dolor de pepe intenso. · Tiene cualquier sangrado vaginal. 
· Tiene dolor abdominal o cólicos (retortijones). · Tiene fiebre. · Ha tenido contracciones regulares (con o sin dolor) por [facility]. [place] significa que tiene 8 o más contracciones en 1 hora o que tiene 4 contracciones o más en 20 minutos después de [place] de posición y alejandro líquidos. · Le sale líquido de la vagina de manera repentina. · Tiene dolor en la parte baja de la espalda o presión en la pelvis que no desaparece. · Nota que rodriguez bebé ha dejado de moverse o se mueve mucho menos de lo normal. 
Preste especial atención a los cambios en rodriguez ladan y asegúrese de comunicarse con rodriguez médico si: · Tiene preguntas acerca de la inducción del [place]. Dónde puede encontrar más información en [facility]? Reji Mane a http://kenyon.info/. Mili Garduno A697 en la búsqueda para aprender más acerca de \"Inducción del parto: Instrucciones de cuidado - [ Labor Induction: Care Instructions ]. \" 
Revisado: 30 mayo, 2016 Versión del contenido: 11.1 © 0258-1149 [employer], [employer].  Las instrucciones de cuidado fueron adaptadas bajo licencia por [employer] (which disclaims liability or warranty for this information). Si usted tiene [place] afección médica o sobre estas instrucciones, siempre pregunte a rodriguez profesional de ladan. [facility] niega toda garantía o responsabilidad por rodrgiuez uso de esta información. Semana 39 de rodriguez embarazo: Instrucciones de cuidado - [ Week 44 of Your Pregnancy: Care Instructions ] Instrucciones de cuidado Vanessa estas últimas semanas, podría sentirse ansiosa por milagro a rodriguez nuevo bebé. Los bebés recién nacidos suelen tener un aspecto distinto al que ve en fotografías o películas. Inmediatamente después del nacimiento, es posible que la pepe tenga sima forma extraña. Los ojos podrían estar inflamados. Y los genitales manuel vez estén hinchados. Además, podrían tener la piel muy seca o marcas rojizas en los párpados, la nariz o el aga. De todos modos, la mayoría de los padres creen que dominique bebés son hermosos. La atención de seguimiento es sima parte clave de rodriguez tratamiento y seguridad. Asegúrese de hacer y acudir a todas las citas, y llame a rodriguez médico si está teniendo problemas. También es sima buena idea saber los resultados de los exámenes y mantener sima lista de los medicamentos que navin. Cómo puede cuidarse en el hogar? Prepárese para amamantar · Si amamanta, siga comiendo alimentos saludables. · Evite el alcohol, los cigarrillos y las drogas. Mathis incluye los medicamentos recetados y los de [employer]. · Puede ayudar a evitar dolor en los pezones alimentando a rodriguez bebé en la posición correcta. Las [employer] ayudarán a aprender [employer]. · Rodriguez bebé recién nacido necesitará alimentarse con sima frecuencia de 1½ a 3 horas, aproximadamente. Elija el método anticonceptivo correcto después de que nazca el bebé · Las mujeres que están amamantando aún pueden [place]. Use un método anticonceptivo si no quiere quedar embarazada.  
· Los dispositivos intrauterinos (DIU) son buenos para las mujeres que quieren esperar al menos 2 años antes de volver a [place]. Estos dispositivos son seguros para el período de amamantamiento. · Se puede usar Depo-Provera mientras está amamantando. Es sima inyección que se aplica cada 3 meses. · Las pastillas anticonceptivas son eficaces. Donnell necesitará un tipo de pastilla distinta mientras esté amamantando. Y cuando comience a alejandro estas pastillas, debe asegurarse de usar otro método anticonceptivo hasta comenzar a alejandro el seble paquete. · Los diafragmas, los capuchones cervicales, los implantes tubáricos y los condones (preservativos) con espermicida son menos eficaces después del parto. Si tiene un diafragma o un capuchón cervical, raissa tendrá que ser modificado. · Tanto la ligadura de trompas (atadura de trompas) kathryn la vasectomía son permanentes. Estas son [place] opciones si está sanchez de que ya no va a querer [facility]. Dónde puede encontrar más información en [facility]? Crissy Biggs a [email or web address]-kelvin.info/. Mansi Ryan A535 en la búsqueda para aprender más acerca de \"Semana 44 de rodriguez embarazo: Instrucciones de cuidado - [ Week 44 of Your Pregnancy: Care Instructions ]. \" 
Revisado: 30 cruz, 2016 Versión del contenido: 11.1 © 1872-3917 Healthwise, Incorporated. Las instrucciones de cuidado fueron adaptadas bajo licencia por [employer] (which disclaims liability or warranty for this information). Si usted tiene [place] afección médica o sobre estas instrucciones, siempre pregunte a rodriguez profesional de ladan. Healthwise, Incorporated niega toda garantía o responsabilidad por rodriguez uso de esta información. Precauciones en el embarazo: Instrucciones de cuidado - [ Pregnancy Precautions: Care Instructions ] Instrucciones de cuidado No hay sima manera sanchez de prevenir el trabajo de parto antes de la fecha esperada (trabajo de parto prematuro) o de prevenir la [place] de otros problemas en el embarazo. Donnell hay cosas que puede hacer para aumentar las probabilidades de tener un embarazo saludable. Vaya a dominique citas, siga los consejos de rodriguez médico y cuídese. Coma rolf y alfredito ejercicio (si rodriguez médico lo permite). Y asegúrese de alejandro abundante agua. La atención de seguimiento es sima parte clave de rodriguez tratamiento y seguridad. Asegúrese de hacer y acudir a todas las citas, y llame a rodriguez médico si está teniendo problemas. También es sima buena idea saber los resultados de los exámenes y mantener sima lista de los medicamentos que navin. Cómo puede cuidarse en el hogar? · Asegúrese de asistir a las citas prenatales. Rodriguez médico le tomará la presión arterial en cada consulta. Rodriguez médico también comprobará si tiene proteínas en rodriguez orina. Tanto la presión arterial deepthi kathryn la presencia de proteínas en la orina son señales de preeclampsia. Esta afección puede ser peligrosa tanto para usted kathryn para rodriguez bebé. · Nikki abundantes líquidos, suficientes para que rodriguez orina sea de color amarillo saji o transparente kathryn el agua. La deshidratación puede causar contracciones. Si tiene [place] [facility], el corazón o el hígado y tiene que [place]'s líquidos, hable con rodriguez médico antes de aumentar rodriguez consumo. · Notifique a rodriguez médico de inmediato si presenta cualquier síntoma de infección, tales kathryn: ¨ Ardor cuando orina. ¨ Flujo con mal olor de la vagina. ¨ Comezón en la vagina. ¨ Fiebre sin explicación. ¨ Dolor o sensibilidad inusual en el útero o la parte baja del abdomen. · Aliméntese en forma equilibrada. Incluya muchos alimentos que elpidio ricos en calcio y titus. ¨ Entre los alimentos ricos en calcio se incluyen la [place], el queso, el yogur, Gita Bakari y el brócoli. ¨ Entre los alimentos ricos en titus se incluyen las kasi amrtin, los [place], las aves, los [place], los frijoles, las uvas pasas, el pan de grano integral y las verduras de hojas verdes. · No fume. Si necesita ayuda para dejar de fumar, hable con rodriguez médico sobre programas y medicamentos para dejar de fumar. Estos pueden aumentar dominique probabilidades de dejar el hábito para siempre. · No david alcohol ni use drogas ilegales. · Siga las instrucciones de rodriguez médico acerca de la [place]. Rodriguez médico le dirá cuánto ejercicio puede hacer. · Pregúntele a rodriguez médico si puede tener [employer]. Si usted está en riesgo de tener trabajo de [place], rodriguez médico podría pedirle que no tenga relaciones sexuales. · Clay precauciones para prevenir las caídas. Vanessa el embarazo las articulaciones están más sueltas y se tiene menos equilibrio. Los deportes tales kathryn el ciclismo, el esquí o el patinaje en línea pueden aumentar el riesgo de caídas. Y no monte a derrick, vira en motocicleta, aflredito clavados, alfredito esquí acuático, bucee, ni salte en paracaídas mientras está embarazada. · Evite calentarse demasiado. No use saunas ni bañeras de hidromasaje. Evite la exposición al sol en climas calientes por mucho tiempo. Clay acetaminofén (Tylenol) para bajar sima fiebre deepthi. · No tome medicamentos de venta shai, productos herbarios ni suplementos sin hablar yuni con rodriguez médico o farmacéutico. 

Cuándo debe pedir ayuda? Llame al 911 en cualquier momento que considere que necesita atención de [place]. Por ejemplo, llame si: 
· Se desmayó (perdió el conocimiento). · Tiene sangrado vaginal intenso. · Tiene dolor intenso en el vientre o la pelvis. · Le sale abundante líquido o gotea de la vagina y sabe o cristal que el cordón umbilical se está saliendo a rodriguez vagina. Si esto sucede, arrodíllese de inmediato, de manuel forma que dominique nalgas estén más altas que rodriguez pepe. [place] disminuirá la presión sobre el cordón umbilical hasta que llegue la [facility]. Llame a rodriguez médico ahora mismo o busque atención médica inmediata si: · Tiene señales de preeclampsia, tales kathryn: ¨ Se le hinchan de manera repentina la felix, las caroline o los pies. ¨ Problemas nuevos con la visión (kathryn oscurecimiento de la visión o visión borrosa). ¨ Dolor de pepe intenso. · Tiene cualquier sangrado vaginal. 
· Tiene dolor abdominal o cólicos. · Tiene fiebre. · Ha tenido contracciones regulares (con o sin dolor) por [place]. [place] significa que tiene 8 o más contracciones en 1 hora o que tiene 4 contracciones o más en 20 minutos después de [place] de posición y alejandro líquidos. · Tiene sima pérdida repentina de líquido por la vagina. · Tiene dolor en la parte baja de la espalda o presión en la pelvis que no desaparece. · Nota que rodriguez bebé ha dejado de moverse o se mueve mucho menos de lo normal. 
Preste especial atención a los cambios en rodriguez ladan y asegúrese de comunicarse con rodriguez médico si tiene algún problema. Dónde puede encontrar más información en inglés? Crissy Biggs a http://greg-kelvin.info/. Mansi Ryan I113 en la búsqueda para aprender más acerca de \"Precauciones en el embarazo: Instrucciones de cuidado - [ Pregnancy Precautions: Care Instructions ]. \" 
Revisado: 30 cruz, 2016 Versión del contenido: 11.1 © 3189-2667 Healthwise, Incorporated. Las instrucciones de cuidado fueron adaptadas bajo licencia por [employer] (which disclaims liability or warranty for this information). Si usted tiene [place] afección médica o sobre estas instrucciones, siempre pregunte a rodriguez profesional de ladan. Healthwise, Incorporated niega toda garantía o responsabilidad por rodriguez uso de esta información. Introducing [facility] named [facility]! [facility] introduce portal paciente MyChart . Ahora se puede acceder a partes de rodriguez expediente médico, enviar por correo electrónico la oficina de rodriguez médico y solicitar renovaciones de medicamentos en línea. En rodriguez navegador de Internet , Ramy Heaton a [email or web address] clic en el usuario por Claire Khan? Hamzahroyalmarilyn Rnajith clic aquí en la sesión [facility]. Verá la página de registro [place]. Ingrese rodriguez código de [employer] manuel y kathryn aparece a continuación. Usted no tendrá que [employer] código después de maryuri completado el proceso de registro . Si usted no se inscribe antes de la fecha de caducidad , debe solicitar un nuevo código. · MyChart Código de acceso : [id number] Expires: 4/20/2017  9:48 AM 
 
Ingresa los últimos cuatro dígitos de rodriguez Número de Seguro Social ( xxxx ) y fecha de nacimiento ( dd / mm / aaaa ) kathryn se indica y alfredito clic en Enviar. Usted será llevado a la siguiente página de registro . Crear un ID MyChart . Esta será rodriguez ID de inicio de sesión de MyChart y no puede ser [place] , por lo que pensar en sima que es [place]mie [place] y fácil de recordar . Crear sima contraseña MyChart . Usted puede cambiar rodriguez contraseña en cualquier momento . Ingrese rodriguez Password Reset de preguntas y [place] . Judsonia se puede utilizar en un momento posterior si usted olvida rodriguez contraseña. Introduzca rodriguez dirección de correo electrónico . Angely Gen recibirá sima notificación por correo electrónico cuando la nueva información está disponible en MyChart . Verlinda Rumpf clic en Registrarse. Warner Homme milagro y descargar porciones de rodriguez expediente médico. 
Alfredito clic en el enlace de descarga del menú Resumen para descargar sima copia portátil de rodriguez información médica . Si tiene [employer] , por favor visite la sección de preguntas frecuentes del sitio web MyChart . Recuerde, MyChart NO es que se utilizará para las necesidades urgentes. Para emergencias médicas , llame al 911 . Ahora disponible en rodriguez iPhone y Android ! Por favor proporcione raissa resumen de la documentación de cuidado a rodriguez próximo proveedor. Your primary care clinician is listed as Azam Wise. If you have any questions after today's visit, please call [phone].

## 2017-02-22 NOTE — PROGRESS NOTES
RETURN OB VISIT SUMMARY    Subjective:   She has no unusual complaints and denies any vb, lof, ctxs, and notes good fetal movement. Having a baby boy named Clifford. Objective:   Physical Exam:  ABDOMEN: fundus soft, nontender 39 cm and FHT present @ 150s bpm  See prenatal flowsheet and physical exam section    Assessment/Plan:   Normal pregnancy at 38w6d  ED warnings. RTC 1 wk. GBS pos treat in labor. Hx of genital herpes. Asymptomatic. Cont acyclovir for ppx. Routine Prenatal care.

## 2017-02-22 NOTE — PATIENT INSTRUCTIONS
Inducción del parto: Instrucciones de cuidado - [ Labor Induction: Care Instructions ]  Instrucciones de cuidado  Si pasa la fecha de término para el parto y raissa no comienza por sí mismo, rodriguez médico podría tratar de inducirlo. Rodriguez médico podría sugerir el hacer esto por otras razones. Inducir el parto podría ser [facility] buena idea si tiene otro problema médico, kathryn presión arterial deepthi. O podría ser isma buena idea si la placenta ya no puede suministrarle suficiente sustento a rodriguez bebé. Existen varias medidas de inducir el [place]. · Se podrían usar medicamentos para ablandar el aga uterino y ayudar a adelgazarlo. · Se podrían usar medicamentos para hacer que el útero se contraiga. · Se podría usar un catéter con globo para ayudar a que se max el aga uterino. · Si rodriguez aga uterino está blando y ligeramente abierto, el médico podría despegar las [employer] o romper el saco amniótico para iniciar o incrementar el [place]. Podría ser necesario que rodriguez bebé nazca a través de un gi (incisión) en rodriguez abdomen. [place] se llama cesárea. Podría realizarse si rodriguez médico ha usado medicamentos parag el [place]o no avanza. Después de tener a rodriguez bebé, usted no debería tener ningún efecto secundario causado por el medicamento usado para iniciar el [place]. La atención de seguimiento es sima parte clave de rodriguez tratamiento y seguridad. Asegúrese de hacer y acudir a todas las citas, y llame a rodriguez médico si está teniendo problemas. También es sima buena idea saber los resultados de los exámenes y mantener sima lista de los medicamentos que navin. ¿Cuándo debe inducirse el parto? · Rodriguez embarazo se ha pasado de 1 a 2 semanas de la fecha de nacimiento prevista. · Usted tiene un problema médico que podría afectar rodriguez ladan o la de rodriguez bebé si el embarazo continúa. [place] incluye presión arterial deepthi, preeclampsia o diabetes. · La sandeep se rompe parag el parto no comienza.   ¿Cuándo debe pedir ayuda? Llame al 911 en cualquier momento que considere que necesita atención de [place]. Por ejemplo, llame si:  · Se desmayó (perdió el conocimiento). · Tiene sangrado vaginal intenso. · Tiene dolor intenso en el abdomen o la pelvis. · Le sale abundante líquido o gotea de la vagina y sabe o cristal que el cordón umbilical se está saliendo a rodriguez vagina. Si esto sucede, arrodíllese de inmediato, de manuel forma que dominique nalgas estén más altas que rodriguez pepe. [place] disminuirá la presión sobre el cordón umbilical hasta que llegue la [facility]. Llame a rodriguez médico ahora mismo o busque atención médica inmediata si:  · Tiene señales de preeclampsia, tales kathryn:  ¨ Se le hinchan de manera repentina la felix, las caroline o los pies. ¨ Problemas nuevos con la visión (kathryn oscurecimiento de la visión o visión borrosa). ¨ Dolor de pepe intenso. · Tiene cualquier sangrado vaginal.  · Tiene dolor abdominal o cólicos (retortijones). · Tiene fiebre. · Ha tenido contracciones regulares (con o sin dolor) por [place]. [place] significa que tiene 8 o más contracciones en 1 hora o que tiene 4 contracciones o más en 20 minutos después de [place] de posición y alejandro líquidos. · Le sale líquido de la vagina de manera repentina. · Tiene dolor en la parte baja de la espalda o presión en la pelvis que no desaparece. · Nota que rodriguez bebé ha dejado de moverse o se mueve mucho menos de lo normal.  Preste especial atención a los cambios en rodriguez ladan y asegúrese de comunicarse con rodriguez médico si:  · Tiene preguntas acerca de la inducción del trabajo de [place]. ¿Dónde puede encontrar más información en inglés? Halinacollette Olson a [email or web address] Radha Butler [id number] en la búsqueda para aprender más acerca de \"Inducción del parto: Instrucciones de cuidado - [ Labor Induction: Care Instructions ]. \"  Revisado: 30 cruz, 2016  Versión del contenido: 11.1  © 1349-7986 [employer].  Las instrucciones de cuidado fueron adaptadas bajo licencia por Good [employer] (which disclaims liability or warranty for this information). Si usted tiene [place] afección médica o sobre estas instrucciones, siempre pregunte a rodriguez profesional de ladan. [facility] [employer]orporated niega toda garantía o responsabilidad por rodriguez uso de esta información. Semana 39 de rodriguez embarazo: Instrucciones de cuidado - [ Week 44 of Your Pregnancy: Care Instructions ]  Instrucciones de cuidado    Vanessa estas últimas semanas, podría sentirse ansiosa por milagro a rodriguez nuevo bebé. Los bebés recién nacidos suelen tener un aspecto distinto al que ve en fotografías o películas. Inmediatamente después del nacimiento, es posible que la pepe tenga sima forma extraña. Los ojos podrían estar inflamados. Y los genitales manuel vez estén hinchados. Además, podrían tener la piel muy seca o marcas rojizas en los párpados, la nariz o el aga. De todos modos, la mayoría de los padres creen que dominique bebés son hermosos. La atención de seguimiento es sima parte clave de rodriguez tratamiento y seguridad. Asegúrese de hacer y acudir a todas las citas, y llame a rodriguez médico si está teniendo problemas. También es sima buena idea saber los resultados de los exámenes y mantener sima lista de los medicamentos que navin. ¿Cómo puede cuidarse en el hogar? Prepárese para amamantar  · Si amamanta, siga comiendo alimentos saludables. · Evite el alcohol, los cigarrillos y las drogas. [place] incluye los medicamentos recetados y los de [place]. · Puede ayudar a evitar dolor en los pezones alimentando a rodriguez bebé en la posición correcta. Las [place] ayudarán a aprender [employer]. · Rodriguez bebé recién nacido necesitará alimentarse con sima frecuencia de 1½ a 3 horas, aproximadamente. Elija el método anticonceptivo correcto después de que nazca el bebé  · Las mujeres que están amamantando aún pueden [place]. Use un método anticonceptivo si no quiere quedar embarazada.   · Los dispositivos intrauterinos (DIU) son buenos para las mujeres que quieren esperar al menos 2 años antes de volver a [place]. Estos dispositivos son seguros para el período de amamantamiento. · Se puede usar Depo-Provera mientras está amamantando. Es sima inyección que se aplica cada 3 meses. · Las pastillas anticonceptivas son eficaces. Donnell necesitará un tipo de pastilla distinta mientras esté amamantando. Y cuando comience a alejandro estas pastillas, debe asegurarse de usar otro método anticonceptivo hasta comenzar a alejandro el seble paquete. · Los diafragmas, los capuchones cervicales, los implantes tubáricos y los condones (preservativos) con espermicida son menos eficaces después del parto. Si tiene un diafragma o un capuchón cervical, raissa tendrá que ser modificado. · Tanto la ligadura de trompas (atadura de trompas) kathryn la vasectomía son permanentes. Estas son E[place] Lupe opciones si está sanchez de que ya no va a querer [facility]. ¿Dónde puede encontrar más información en [facility]? Danisha Anand a [email or web address] Princess Esqueda [id number] en la búsqueda para aprender más acerca de \"Semana 44 de rodriguez embarazo: Instrucciones de cuidado - [ Week 44 of Your Pregnancy: Care Instructions ]. \"  Revisado: 30 cruz, 2016  Versión del contenido: 11.1  © 1308-9781 [employer] Las instrucciones de cuidado fueron adaptadas bajo licencia por [employer] (which disclaims liability or warranty for this information). Si usted tiene [place] afección médica o sobre estas instrucciones, siempre pregunte a rodriguez profesional de ladan. [employer] niega toda garantía o responsabilidad por rodriguez uso de esta información. Precauciones en el embarazo:  Instrucciones de cuidado - [ Pregnancy Precautions: Care Instructions ]  Instrucciones de cuidado  No hay sima manera sanchez de prevenir el trabajo de parto antes de la fecha esperada (trabajo de parto prematuro) o de prevenir la mayoría de otros problemas en el [facility]. Donnell hay cosas que puede hacer para aumentar las probabilidades de tener un embarazo saludable. Vaya a dominique citas, siga los consejos de rodriguez médico y cuídese. Coma rolf y alfredito ejercicio (si rodriguez médico lo permite). Y asegúrese de alejandro abundante agua. La atención de seguimiento es sima parte clave de rodriguez tratamiento y seguridad. Asegúrese de hacer y acudir a todas las citas, y llame a rodriguez médico si está teniendo problemas. También es sima buena idea saber los resultados de los exámenes y mantener sima lista de los medicamentos que navin. ¿Cómo puede cuidarse en el hogar? · Asegúrese de asistir a las citas prenatales. Rodriguez médico le tomará la presión arterial en cada consulta. Rodriguez médico también comprobará si tiene proteínas en rodriguez orina. Tanto la presión arterial deepthi kathryn la presencia de proteínas en la orina son señales de preeclampsia. Esta afección puede ser peligrosa tanto para usted kathryn para rodriguez bebé. · Nikki abundantes líquidos, suficientes para que rodriguez orina sea de color amarillo saji o transparente kathryn el agua. La deshidratación puede causar contracciones. Si tiene [employer], el corazón o el hígado y tiene que [place]'s líquidos, hable con rodriguez médico antes de aumentar rodriguez consumo. · Notifique a rodriguez médico de inmediato si presenta cualquier síntoma de infección, tales kathryn:  ¨ Ardor cuando orina. ¨ Flujo con mal olor de la vagina. ¨ Comezón en la vagina. ¨ Fiebre sin explicación. ¨ Dolor o sensibilidad inusual en el útero o la parte baja del abdomen. · Aliméntese en forma equilibrada. Incluya muchos alimentos que elpidio ricos en calcio y titus. ¨ Entre los alimentos ricos en calcio se incluyen la [place], el queso, el yogur, Amee Nicolas y el brócoli. ¨ Entre los alimentos ricos en titus se incluyen las kasi martin, los [place], las aves, los [place], los frijoles, las uvas pasas, el pan de grano integral y las verduras de hojas verdes. · No fume. Si necesita ayuda para dejar de fumar, hable con rodriguez médico sobre programas y medicamentos para dejar de fumar. Estos pueden aumentar dominique probabilidades de dejar el hábito para siempre. · No david alcohol ni use drogas ilegales. · Siga las instrucciones de rodriguez médico acerca de la [place]. Rodriguez médico le dirá cuánto ejercicio puede hacer. · Pregúntele a rodriguez médico si puede tener [employer]. Si usted está en riesgo de tener trabajo de [place], rodriguez médico podría pedirle que no tenga relaciones sexuales. · [place] precauciones para prevenir las caídas. Vanessa el embarazo las articulaciones están más sueltas y se tiene menos equilibrio. Los deportes tales kathryn el ciclismo, el esquí o el patinaje en línea pueden aumentar el riesgo de caídas. Y no monte a derrick, vira en motocicleta, alfredito clavados, alfredito esquí acuático, bucee, ni salte en paracaídas mientras está embarazada. · Evite calentarse demasiado. No use saunas ni bañeras de hidromasaje. Evite la exposición al sol en climas calientes por mucho tiempo. [place] acetaminofén (Tylenol) para bajar sima fiebre deepthi. · No tome medicamentos de venta shai, productos herbarios ni suplementos sin hablar yuni con rodriguez médico o farmacéutico.  ¿Cuándo debe pedir ayuda? Llame al 911 en cualquier momento que considere que necesita atención de [place]. Por ejemplo, llame si:  · Se desmayó (perdió el conocimiento). · Tiene sangrado vaginal intenso. · Tiene dolor intenso en el vientre o la pelvis. · Le sale abundante líquido o gotea de la vagina y sabe o cristal que el cordón umbilical se está saliendo a rodriguez vagina. Si esto sucede, arrodíllese de inmediato, de manuel forma que dominique nalgas estén más altas que rodriguez pepe. [place] disminuirá la presión sobre el cordón umbilical hasta que llegue la [facility].   Llame a rodriguez médico ahora mismo o busque atención médica inmediata si:  · Tiene señales de preeclampsia, tales kathryn:  ¨ Se le hinchan de manera repentina la felix, las caroline o los pies.  ¨ Problemas nuevos con la visión (kathryn oscurecimiento de la visión o visión borrosa). ¨ Dolor de pepe intenso. · Tiene cualquier sangrado vaginal.  · Tiene dolor abdominal o cólicos. · Tiene fiebre. · Ha tenido contracciones regulares (con o sin dolor) por [place]. [place] significa que tiene 8 o más contracciones en 1 hora o que tiene 4 contracciones o más en 20 minutos después de [place] de posición y alejandro líquidos. · Tiene sima pérdida repentina de líquido por la vagina. · Tiene dolor en la parte baja de la espalda o presión en la pelvis que no desaparece. · Nota que rodriguez bebé ha dejado de moverse o se mueve mucho menos de lo normal.  Preste especial atención a los cambios en rodriguez ladan y asegúrese de comunicarse con rodriguez médico si tiene algún problema. ¿Dónde puede encontrar más información en inglés? Halina Wesley a [email or web address] Radha Butler [id number] en la búsqueda para aprender más acerca de \"Precauciones en el embarazo: Instrucciones de cuidado - [ Pregnancy Precautions: Care Instructions ]. \"  Revisado: 30 cruz, 2016  Versión del contenido: 11.1  © 2006-2016 Healthwise, Incorporated. Las instrucciones de cuidado fueron adaptadas bajo licencia por [employer] (which disclaims liability or warranty for this information). Si usted tiene [place] afección médica o sobre estas instrucciones, siempre pregunte a rodriguez profesional de ladan. Healthwise, Incorporated niega toda garantía o responsabilidad por rodriguez uso de esta información.

## 2017-02-22 NOTE — PROGRESS NOTES
Chief Complaint   Patient presents with    Routine Prenatal Visit      38w & 6d     1. Have you been to the ER, urgent care clinic since your last visit? Hospitalized since your last visit? No    2. Have you seen or consulted any other health care providers outside of the [address] [place] [address] since your last visit? Include any pap smears or colon screening. No     Pt denies any bleeding, cramping or leakage of fluid. + fetal movement.

## 2017-02-27 ENCOUNTER — HOSPITAL ENCOUNTER (INPATIENT)
Age: 32
LOS: 3 days | Discharge: HOME | End: 2017-03-02
Attending: FAMILY MEDICINE | Admitting: OBSTETRICS & GYNECOLOGY
Payer: SUBSIDIZED

## 2017-02-27 PROBLEM — R10.2 PELVIC PAIN AFFECTING PREGNANCY IN THIRD TRIMESTER, ANTEPARTUM: Status: ACTIVE | Noted: 2017-02-27

## 2017-02-27 PROBLEM — O26.893 PELVIC PAIN AFFECTING PREGNANCY IN THIRD TRIMESTER, ANTEPARTUM: Status: ACTIVE | Noted: 2017-02-27

## 2017-02-27 LAB
ERYTHROCYTE [DISTWIDTH] IN BLOOD BY AUTOMATED COUNT: 13.2 % (ref 11.5–14.5)
HCT VFR BLD AUTO: 37.2 % (ref 35–47)
HGB BLD-MCNC: 12.6 G/DL (ref 11.5–16)
MCH RBC QN AUTO: 30.4 PG (ref 26–34)
MCHC RBC AUTO-ENTMCNC: 33.9 G/DL (ref 30–36.5)
MCV RBC AUTO: 89.9 FL (ref 80–99)
PLATELET # BLD AUTO: 152 K/UL (ref 150–400)
RBC # BLD AUTO: 4.14 M/UL (ref 3.8–5.2)
WBC # BLD AUTO: 11.7 K/UL (ref 3.6–11)

## 2017-02-27 PROCEDURE — 75410000002 HC LABOR FEE PER 1 HR: Performed by: OBSTETRICS & GYNECOLOGY

## 2017-02-27 PROCEDURE — 36415 COLL VENOUS BLD VENIPUNCTURE: CPT | Performed by: OBSTETRICS & GYNECOLOGY

## 2017-02-27 PROCEDURE — 74011000258 HC RX REV CODE- 258: Performed by: OBSTETRICS & GYNECOLOGY

## 2017-02-27 PROCEDURE — 99218 HC RM OBSERVATION: CPT

## 2017-02-27 PROCEDURE — 59025 FETAL NON-STRESS TEST: CPT | Performed by: FAMILY MEDICINE

## 2017-02-27 PROCEDURE — 99281 EMR DPT VST MAYX REQ PHY/QHP: CPT | Performed by: FAMILY MEDICINE

## 2017-02-27 PROCEDURE — 74011250636 HC RX REV CODE- 250/636: Performed by: OBSTETRICS & GYNECOLOGY

## 2017-02-27 PROCEDURE — 65270000029 HC RM PRIVATE

## 2017-02-27 PROCEDURE — 85027 COMPLETE CBC AUTOMATED: CPT | Performed by: OBSTETRICS & GYNECOLOGY

## 2017-02-27 RX ORDER — SODIUM CHLORIDE, SODIUM LACTATE, POTASSIUM CHLORIDE, CALCIUM CHLORIDE 600; 310; 30; 20 MG/100ML; MG/100ML; MG/100ML; MG/100ML
125 INJECTION, SOLUTION INTRAVENOUS CONTINUOUS
Status: DISCONTINUED | OUTPATIENT
Start: 2017-02-27 | End: 2017-03-02 | Stop reason: HOSPADM

## 2017-02-27 RX ORDER — SODIUM CHLORIDE 0.9 % (FLUSH) 0.9 %
5-10 SYRINGE (ML) INJECTION EVERY 8 HOURS
Status: CANCELLED | OUTPATIENT
Start: 2017-02-27

## 2017-02-27 RX ORDER — SODIUM CHLORIDE 0.9 % (FLUSH) 0.9 %
5-10 SYRINGE (ML) INJECTION AS NEEDED
Status: CANCELLED | OUTPATIENT
Start: 2017-02-27

## 2017-02-27 RX ORDER — SODIUM CHLORIDE, SODIUM LACTATE, POTASSIUM CHLORIDE, CALCIUM CHLORIDE 600; 310; 30; 20 MG/100ML; MG/100ML; MG/100ML; MG/100ML
125 INJECTION, SOLUTION INTRAVENOUS CONTINUOUS
Status: CANCELLED | OUTPATIENT
Start: 2017-02-27

## 2017-02-27 RX ORDER — NALOXONE HYDROCHLORIDE 0.4 MG/ML
0.4 INJECTION, SOLUTION INTRAMUSCULAR; INTRAVENOUS; SUBCUTANEOUS AS NEEDED
Status: DISCONTINUED | OUTPATIENT
Start: 2017-02-27 | End: 2017-02-28 | Stop reason: HOSPADM

## 2017-02-27 RX ADMIN — SODIUM CHLORIDE, SODIUM LACTATE, POTASSIUM CHLORIDE, AND CALCIUM CHLORIDE 125 ML/HR: 600; 310; 30; 20 INJECTION, SOLUTION INTRAVENOUS at 22:45

## 2017-02-27 RX ADMIN — SODIUM CHLORIDE 5 MILLION UNITS: 900 INJECTION, SOLUTION INTRAVENOUS at 22:45

## 2017-02-27 NOTE — IP AVS SNAPSHOT
Ivette Seun 
 
 
 [address] [facility] 
[phone] Patient: Uri Tim MRN: [id number] :1985 You are allergic to the following No active allergies Recent Documentation Height  
  
  
  
  
  
 1.626 m Unresulted Labs Order Current Status SAMPLE TO BLOOD BANK In process Emergency Contacts Name Discharge Info Relation Home Work Mobile Tapan Gomes  Other Relative [6] [phone] About your hospitalization You were admitted on:  2017 You last received care in the:  [facility] 3 [facility] You were discharged on:  2017 Unit phone number:  [phone] Why you were hospitalized Your primary diagnosis was:  Not on File Your diagnoses also included:  Pelvic Pain Affecting Pregnancy In Third Trimester, Antepartum Providers Seen During Your Hospitalizations Provider Role Specialty Primary office phone Tho Hernadez MD Attending Provider Family Practice [phone] Your Primary Care Physician (PCP) Primary Care Physician Office Phone Office Fax 1010 [facility], [address] [place] [address] [phone] Follow-up Information Follow up With Details Comments Contact Info Tho Hernadez MD Schedule an appointment as soon as possible for a visit in 6 weeks  Macario Hernandez [address] 1007 [facility] 
[phone] Your Appointments 2017  2:30 PM EST  
OB VISIT with Tho Hernadez MD  
[address] [place] [address]  
 [address]  
[phone] Current Discharge Medication List  
  
START taking these medications Dose & Instructions Dispensing Information Comments Morning Noon Evening Bedtime  
 docusate sodium 100 mg capsule Commonly known as:  Doc Jerri  
 Your next dose is: Today, Tomorrow Other:  _________ Dose:  100 mg Take 1 Cap by mouth two (2) times daily as needed for Constipation for up to 90 days. Quantity:  30 Cap Refills:  0  
     
   
   
   
  
 ibuprofen 800 mg tablet Commonly known as:  MOTRIN Your next dose is: Today, Tomorrow Other:  _________ Dose:  800 mg Take 1 Tab by mouth every eight (8) hours. Quantity:  30 Tab Refills:  0 CONTINUE these medications which have NOT CHANGED Dose & Instructions Dispensing Information Comments Morning Noon Evening Bedtime PRENATAL PO Your next dose is: Today, Tomorrow Other:  _________ Take  by mouth. Refills:  0  
     
   
   
   
  
 sodium chloride 0.65 % nasal spray Commonly known as:  SALINE NASAL Your next dose is: Today, Tomorrow Other:  _________ Dose:  1 Spray 1 spray by Both Nostrils route as needed for Congestion. Quantity:  30 mL Refills:  0 STOP taking these medications   
 acyclovir 400 mg tablet Commonly known as:  ZOVIRAX Where to Get Your Medications Information on where to get these meds will be given to you by the nurse or doctor. ! Ask your nurse or doctor about these medications  
  docusate sodium 100 mg capsule  
 ibuprofen 800 mg tablet Discharge Instructions Patient Discharge Instructions [facility] / [id number] : 1985 Admitted 2017 Discharged: 3/2/2017 Please bring this form with you to show your care provider at your follow-up appointment. Primary care provider:  Rossana Case MD 
 
Discharging provider:  Hang Bello MD  - Family Medicine Resident Shemar Nunez MD - Family Medicine Attending ACUTE DIAGNOSES: 
Pelvic pain affecting pregnancy in third trimester, antepartum FOLLOW-UP CARE RECOMMENDATIONS: 
 
Follow-up Information Follow up With Details Comments Contact Info Lam Rizvi MD Schedule an appointment as soon as possible for a visit in 6 weeks  [facility]5 [address] 
[phone] Continuing Therapy: 
- Please continue Motrin 800 mg, 1 tablet every 8 hours for the next 2 days, then 1 tablet every 8 hours as needed for pain - Please continue Colace 100 mg for constipation, take one tablet BID until having regular bowel movements - Please continue your prenatal vitamins as long as you are breastfeeding Follow-up tests needed: none Pending test results: At the time of your discharge the following test results are still pending: none Please make sure you review these results with your outpatient follow-up provider(s). Specific symptoms to watch for: chest pain, shortness of breath, fever, chills, nausea, vomiting, diarrhea, change in mentation, falling, weakness, bleeding. DIET/what to eat:  Regular Diet ACTIVITY:  
Activity Instructions Do not lift anything heavier than your baby for 6 weeks. Nothing in the vagina for 6 weeks. You are ok to drive as long as you are not taking Percocet or other narcotic pain medication (Motrin is ok). Wound care: you may shower, but do not soak your wound. You may remove your bandaid (steristrips) in 2-3 days if they have not fallen off yet. I understand that if any problems occur once I am at home I am to contact my physician. These instructions were explained to me and I had the opportunity to ask questions. I understand and acknowledge receipt of the instructions indicated above. Physician's or R.N.'s Signature                                                                  Date/Time Patient or Representative Signature                                                          Date/Time Después del parto (período de posparto): Después de la consulta [After Your Delivery (the Postpartum Period): After Your Visit] Instrucciones de cuidado Felicidades por el nacimiento de rodriguez bebé. Al igual que el Juliana, el tiempo con el recién nacido puede ser un momento de [place], Yessy Salome y agotamiento. Es posible que se sienta lovett al mirar la timo de rodriguez pequeño bebé. También podría sentirse abrumada por rodriguez nuevo ritmo de sueño y las nuevas responsabilidades. Al principio, los bebés suelen dormir vanessa el día y permanecen despiertos vanessa la noche. No tienen ningún patrón ni rutina. Podrían mundo gritos ahogados, sacudirse y despertarse, o parecer kathryn si tuvieran los ojos cruzados (bizcos). Todo esto es normal, e incluso la pueden hacer sonreír. Vanessa las primeras semanas siguientes al parto, trate de cuidarse rolf. Podría tardar de 4 a 6 semanas en volver a sentirse usted misma, y posiblemente más tiempo si le mon hecho sima cesárea. Es probable que se sienta muy fatigada vanessa varias semanas. Anne Marie días estarán llenos de [place], parag también de mucha alegría. La atención de seguimiento es sima parte clave de rodriguez tratamiento y seguridad. Asegúrese de hacer y acudir a todas las citas, y llame a rodriguez médico si está teniendo problemas. También es sima buena idea saber los resultados de los exámenes y mantener sima lista de los medicamentos que navin. Cómo puede cuidarse en el hogar? Cuide rodriguez cuerpo después del parto · Utilice toallas sanitarias en vez de tampones para las pérdidas de stacey que podrían durar hasta 2 semanas. · Alivie los cólicos con ibuprofeno (Advil, Motrin).  
· Alivie el dolor de las hemorroides y la pieter entre la vagina y el recto con compresas de hielo o de infusión de hamamelis virginiana (\"witch hazel\"). · Alivie el estreñimiento bebiendo abundante líquido y comiendo alimentos ricos en fibra. Pregúntele a rodriguez médico acerca de los ablandadores de heces de [place]. · Límpiese con un chorrito suave de agua tibia de sima botella en vez de hacerlo con papel higiénico. 
· Valdez un baño de asiento en agua tibia varias veces al día. · Use un buen sostén de lactancia. Alivie el dolor y la hinchazón de los senos con toallitas de aseo húmedas tibias. · Si no está amamantando, utilice hielo en lugar de calor para aliviar el dolor de los senos. · Si está amamantando, rodriguez período menstrual podría no comenzar hasta después de varios meses. Es posible que Chi, y más tiempo al principio, de kathryn lo hacía antes del Michael Dais. · Espere hasta que haya sanado (de 4 a 6 semanas) antes de volver a tener relaciones sexuales. Rodriguez médico le dirá cuándo puede tener relaciones sexuales. · Trate de no viajar con el bebé mine las primeras 5 o 6 semanas. Si hace un viaje marcus en automóvil, alfredito paradas frecuentes para caminar y estirarse. Evite el agotamiento · Descanse todos los días. Trate de dormir la siesta cuando rodriguez bebé también lo hace. · Pídale a otro adulto que la acompañe por unos favio después del [place]. · Planifique el cuidado de los niños si tiene otros hijos. · Sea flexible para que pueda comer a horas fuera de lo común y dormir cuando lo necesite. Tanto usted kathryn rodriguez bebé están creando horarios nuevos. · Planee pequeñas salidas para estar afuera de la casa. El cambio podría hacer que se sienta menos fatigada. · Pida ayuda para cocinar y [address] [facility] Pease hogar y las compras. Recuerde que rodriguez principal tarea consiste en cuidar a rodriguez bebé. Conozca la ayuda que puede recibir en mckenna de tener depresión posparto · La depresión posparto es común mine las primeras 1 a 2 semanas siguientes al nacimiento. Podría llorar o sentirse bernice o irritable sin razón alguna. · Descanse cada vez que pueda hacerlo. Estar fatigada le dificulta manejar dominique emociones. · Salga a caminar con rodriguez bebé. · Hable con rodriguez deonte, dominique amigos y dominique familiares acerca de dominique sentimientos. · Si dominique síntomas rivera más de unas pocas semanas, o si se siente muy deprimida, pídale ayuda a rodriguez médico. 
· La depresión posparto puede tratarse. Los grupos de apoyo y la asesoría psicológica pueden ser de Oseas Aleman. A veces, los medicamentos también pueden ayudar. Manténgase saludable · Coma alimentos sanos para tener más energía, producir sima buena [place] materna y adelgazar las libras adicionales que engordó con el bebé. · Si amamanta, evite el alcohol y las drogas. No fume. Si dejó de fumar mine el embarazo, felicitaciones. · Inicie ejercicios diarios después de 4 a 6 semanas, parag descanse cuando se sienta fatigada. · Aprenda ejercicios para tonificar el abdomen. Braulio los ejercicios de [place] para recuperar la fuerza de los músculos pélvicos. Puede hacer los ejercicios de [place] mientras está de pie o sentada. ¨ Apriete los mismos músculos que usted usaría para detener la [place]. El vientre y las nalgas (glúteos) no deben moverse. ¨ Manténgalos apretados mine 3 segundos, luego relájelos otros 3 segundos. ¨ Repita el ejercicio entre 10 y 13 veces cada sesión. Braulio zuleima o más sesiones cada día. · Busque sima clase para nuevas madres y recién nacidos que tenga un tiempo de ejercicio. · Si le mon hecho sima cesárea, dese un poco más de tiempo antes de hacer ejercicio, y tenga cuidado. Cuándo debe pedir ayuda? Llame al 911 en cualquier momento que considere que necesita atención de emergencia. Por ejemplo, llame si: 
· Se desmayó (perdió el conocimiento). Llame a rodriguez médico ahora mismo o busque atención médica inmediata si: · Tiene sangrado vaginal intenso.  [place] significa que está expulsando coágulos sanguíneos y empapando sima toalla sanitaria cada hora por 2 horas o más. · Está mareada o aturdida, o siente kathryn que se puede [address] [facility]. · Tiene fiebre. · Tiene dolor abdominal nuevo o que empeora. Preste especial atención a los cambios en rodriguez ladan y asegúrese de comunicarse con rodriguez médico si: 
· Rodriguez sangrado vaginal parece volverse más intenso. · Tiene flujo vaginal nuevo o que empeora. · Se siente bernice, ansiosa o sin esperanzas mine más de unos pocos días. · No mejora kathryn se esperaba. Dónde puede encontrar más información en inglés? Rigo Walker a [email or web address].be Mellissa Merritt F160 en la búsqueda para aprender [employer] de \"Después del parto (período de posparto): Después de la consulta. \"  
© 1711-5105 Healthwise, Incorporated. Instrucciones de cuidado adaptadas bajo licencia por Brandee Peralta (which disclaims liability or warranty for this information). Estas instrucciones de cuidado son para usarlas con rodriguez profesional clínico registrado. Si tiene preguntas acerca de sima afección médica o de estas instrucciones, pregunte siempre a rodriguez profesional de [employer]. Healthwise, Incorporated niega cualquier garantía o responsabilidad por rodriguez uso de esta información. Versión del contenido: [id number]; Última revisión: 20 marzo, 2012 PonoMusic Activation Thank you for requesting access to PonoMusic. Please follow the instructions below to securely access and download your online medical record. PonoMusic allows you to send messages to your doctor, view your test results, renew your prescriptions, schedule appointments, and more. How Do I Sign Up? 1. In your internet browser, go to [email or web address] 
2. Click on the First Time User? Click Here link in the Sign In box. You will be redirect to the New Member Sign Up page. 3. Enter your PonoMusic Access Code exactly as it appears below. You will not need to use this code after youve completed the sign-up process.  If you do not sign up before the expiration date, you must request a new code. 99Bill Access Code: [email or web address] Expires: 2017  9:48 AM (This is the date your 99Bill access code will ) 4. Enter the last four digits of your Social Security Number (xxxx) and Date of Birth (mm/dd/yyyy) as indicated and click Submit. You will be taken to the next sign-up page. 5. Create a 99Bill ID. This will be your 99Bill login ID and cannot be changed, so think of one that is secure and easy to remember. 6. Create a 99Bill password. You can change your password at any time. 7. Enter your Password Reset Question and Answer. This can be used at a later time if you forget your password. 8. Enter your e-mail address. You will receive e-mail notification when new information is available in [address] 9. Click Sign Up. You can now view and download portions of your medical record. 10. Click the Download Summary menu link to download a portable copy of your medical information. Additional Information If you have questions, please visit the Frequently Asked Questions section of the 99Bill website at [email or web address] Remember, 99Bill is NOT to be used for urgent needs. For medical emergencies, dial 911. Discharge Instructions Attachments/References BABY-FRIENDLY BREASTFEEDING: GENERAL INFO (Ivorian) BREASTFEEDING (Ivorian) FEEDING: : PEDIATRIC (Ivorian) BREASTFEEDING MOTHERS: NUTRITION (Ivorian) Discharge Orders None [employer]Gen[employer]RoboCV Announcement We are excited to announce that we are making your provider's discharge notes available to you in 99Bill. You will see these notes when they are completed and signed by the physician that discharged you from your recent hospital stay.   If you have any questions or concerns about any information you see in 99Bill, please call the [employer] Information Department where you were seen or reach out to your Primary Care Provider for more information about your plan of care. Introducing [facility]! [facility] introduce portal paciente MyChart . Ahora se puede acceder a partes de rodriguez expediente médico, enviar por correo electrónico la oficina de rodriguez médico y solicitar renovaciones de medicamentos en línea. En rodriguez navegador de Internet , Tristin Ricci a [email or web address] Alfredito clic en el usuario por Robb Beat? Vonne Garry clic aquí en la sesión [facility]. Verá la página de registro [place]. Ingrese rodriguez código de Bank  Gabrielle manuel y kathryn aparece a continuación. Usted no tendrá que [employer] código después de maryuri completado el proceso de registro . Si usted no se inscribe antes de la fecha de caducidad , debe solicitar un nuevo código. · MyChart Código de acceso : [email or web address] Expires: 4/20/2017  9:48 AM 
 
Ingresa los últimos cuatro dígitos de rodriguez Número de Seguro Social ( xxxx ) y fecha de nacimiento ( dd / mm / aaaa ) kathryn se indica y alfredito clic en Enviar. Usted será llevado a la siguiente página de registro . Crear un ID MyChart . Esta será rodriguez ID de inicio de sesión de MyChart y no puede ser [place] , por lo que pensar en sima que es Rusty Oliverio y fácil de recordar . Crear sima contraseña MyChart . Usted puede cambiar rodriguez contraseña en cualquier momento . Ingrese rodriguez Password Reset de preguntas y [place] . [place] se puede utilizar en un momento posterior si usted olvida rodriguez contraseña. Introduzca rodriguez dirección de correo electrónico . Parminder Ortega recibirá sima notificación por correo electrónico cuando la nueva información está disponible en MyChart . Mario Pillar clic en Registrarse. Di Mings milagro y descargar porciones de rodriguez expediente médico. 
Alfredito clic en el enlace de descarga del menú Resumen para descargar sima copia portátil de rodriguez información médica . Si tiene [employer] , por favor visite la sección de preguntas frecuentes del sitio web Innoventureicahart . Tracey Reina NO es que se utilizará para las necesidades urgentes. Para emergencias médicas , deadacia al 911 . Ahora disponible en rodriguez iPhone y Android ! General Information Please provide this summary of care documentation to your next provider. Patient Signature:  ____________________________________________________________ Date:  ____________________________________________________________  
  
Jose Crowder Provider Signature:  ____________________________________________________________ Date:  ____________________________________________________________ More Information Learning About Starting to Breastfeed Planning ahead Before your baby is born, plan ahead. Learn all you can about breastfeeding. This helps make breastfeeding easier. · Early in your pregnancy, talk to your doctor or midwife about breastfeeding. · Learn the basics before your baby is born. The staff at hospitals and birthing centers can help you find a lactation specialist. This person is often a nurse who has been trained to teach and advise women about breastfeeding. Or you can take a breastfeeding class. · Plan ahead for times when you will need help after your baby is born. Many women get help from friends and family. Some join a support group to talk to other moms who breastfeed. · Buy the equipment you'll need. Examples are breast pads, nipple cream, extra pillows, and nursing bras. Find out about breast pumps too. Getting help from your hospital or birthing center It's important to have support from the doctors, nurses, and hospital staff who care for you and your baby. Before it's time for you to give birth, ask about the breastfeeding policies at your hospital or birthing center. Look for a hospital or birthing center that has policies for: · \"Rooming in. \" This policy encourages you to have your baby in the room with you. It can allow you to breastfeed more often. · Supplemental feedings. Tell the staff that your baby is to get only your breast milk from birth. If staff feed your baby water, sugar solution, or formula right after birth without a medical reason, it may make it harder for you to breastfeed. · Pacifiers or artificial nipples. Staff should not give your  these items without your permission. They may interfere with breastfeeding. · Follow-up. Find out if your hospital can help you with breastfeeding issues after you go home. See if you can get information on support groups or other contacts. They might help if you need help setting up and staying with your breastfeeding routine. Your first feeding It's best to start breastfeeding within 1 hour of birth. For each feeding, you go through these basic steps: · Get ready for the feeding. Be calm and relaxed, and try not to be distracted. Get some water or juice for yourself. Use two or three pillows to help support your baby while he or she is nursing. · Find a breastfeeding position that is comfortable for you and your baby. Examples are the cradle and the football positions. Make sure the baby's head and chest are lined up straight and facing your breast. It's best to switch which breast you start with each time. · Get the baby latched on well. Your baby's mouth needs to be wide open, like a yawn, so you may need to gently touch the middle of your baby's lower lip. When your baby's mouth is open wide, quickly bring the baby onto your nipple and areola. The areola is the dark [place] around your nipple. · Provide a complete feeding. Let your baby nurse for at least 15 minutes. Be sure to burp your baby after each breast. 
In the first days after birth, your breasts make a thick, yellow liquid called colostrum. This liquid gives your baby nutrients and antibodies against infection.  It is all that babies need at first. Your breasts will fill with milk a few days after the birth. Talk to your doctor, midwife, or lactation specialist right away if you are having problems and aren't sure what to do. How often to breastfeed Plan to breastfeed your baby on demand rather than setting a strict schedule. For the first few days, be prepared to breastfeed every 1 to 3 hours. That often works out to about 8 to 12 times in a 24-hour period. Wake a sleeping baby to feed, if you need to. If you breastfeed more often, it will help your breasts to produce more milk. After you go home After you're home, don't be afraid to call your doctor, midwife, or lactation specialist with questions. That's true even if you don't know what's bothering you. They are used to parents of newborns calling. They can help you figure out if there is a problem, and if so, how to fix it. Plan for times when you will be apart from your baby. Use a breast pump to collect breast milk ahead of time. You can store milk in the refrigerator or freezer. Then it's ready when someone else will be taking care of your baby. Breastfeeding is a learned skill that gets easier over time. You are more likely to succeed if you plan ahead, learn the basic techniques, and know where to get help and support. Where can you learn more? Go to http://greg-kelvin.info/. Enter P277 in the search box to learn more about \"Learning About Starting to Breastfeed. \" Current as of: May 30, 2016 Content Version: 11.1 © 1540-6366 The Luxe Nomad, Incorporated. Care instructions adapted under license by [employer] (which disclaims liability or warranty for this information). If you have questions about a medical condition or this instruction, always ask your healthcare professional. [address] any warranty or liability for your use of this information. Breastfeeding: Care Instructions Your Care Instructions Breastfeeding has many benefits. It may lower your baby's chances of getting an infection. It also may prevent your baby from having problems such as diabetes and high cholesterol later in life. Breastfeeding also helps you bond with your baby. The American Academy of Pediatrics recommends breastfeeding for at least a year. That may be very hard for many women to do, but breastfeeding even for a shorter period of time is a health benefit to you and your baby. In the first days after birth, your breasts make a thick, yellow liquid called colostrum. This liquid gives your baby nutrients and antibodies against infection. It is all that babies need in the first days after birth. Your breasts will fill with milk a few days after the birth. Breastfeeding is a skill that gets better with practice. It is common to have some problems. Some women have sore or cracked nipples, blocked milk ducts, or a breast infection (mastitis). But if you feed your baby every 1 to 2 hours during the day and follow the tips on this sheet, you may not have these problems. You can treat these problems if they happen and continue breastfeeding. Follow-up care is a key part of your treatment and safety. Be sure to make and go to all appointments, and call your doctor if you are having problems. It's also a good idea to know your test results and keep a list of the medicines you take. How can you care for yourself at home? · Breastfeed your baby whenever he or she is hungry. In the first 2 weeks, your baby will feed about every 1 to 3 hours. This will help you keep up your supply of milk. · Put a bed pillow or a nursing pillow on your lap to support your arms and your baby. · Hold your baby in a comfortable position. ¨ You can hold your baby in several ways. One of the most common positions is the cradle hold. One arm supports your baby, with his or her head in the bend of your elbow.  Your open hand supports your baby's bottom or back. Your baby's belly lies against yours. ¨ If you had your baby by , or , try the football hold. This position keeps your baby off your belly. Tuck your baby under your arm, with his or her body along the side you will be feeding on. Support your baby's upper body with your arm. With that hand you can control your baby's head to bring his or her mouth to your breast. 
¨ Try different positions with each feeding. If you are having problems, ask for help from your doctor or a lactation consultant. · To get your baby to latch on: 
¨ Support and narrow your breast with one hand using a \"U hold,\" with your thumb on the outer side of your breast and your fingers on the inner side. You can also use a \"C hold,\" with all your fingers below the nipple and your thumb above it. Try the different holds to get the deepest latch for whichever breastfeeding position you use. Your other arm is behind your baby's back, with your hand supporting the base of the baby's head. Position your fingers and thumb to point toward your baby's ears. ¨ You can touch your baby's lower lip with your nipple to get your baby to open his or her mouth. Wait until your baby opens up really wide, like a big yawn. Then be sure to bring the baby quickly to your breastnot your breast to the baby. As you bring your baby toward your breast, use your other hand to support the breast and guide it into his or her mouth. ¨ Both the nipple and a large portion of the darker area around the nipple (areola) should be in the baby's mouth. The baby's lips should be flared outward, not folded in (inverted). ¨ Listen for a regular sucking and swallowing pattern while the baby is feeding. If you cannot see or hear a swallowing pattern, watch the baby's ears, which will wiggle slightly when the baby swallows.  If the baby's nose appears to be blocked by your breast, tilt the baby's head back slightly, so just the edge of one nostril is clear for breathing. ¨ When your baby is latched, you can usually remove your hand from supporting your breast and bring it under your baby to cradle him or her. Now just relax and breastfeed your baby. · You will know that your baby is feeding well when: 
¨ His or her mouth covers a lot of the areola, and the lips are flared out. ¨ His or her chin and nose rest against your breast. 
¨ Sucking is deep and rhythmic, with short pauses. ¨ You are able to see and hear your baby swallowing. ¨ You do not feel pain in your nipple. · If your baby takes only one breast at a feeding, start the next feeding on the other breast. 
· Anytime you need to remove your baby from the breast, put one finger in the corner of his or her mouth. Push your finger between your baby's gums to gently break the seal. If you do not break the tight seal before you remove your baby, your nipples can become sore, cracked, or bruised. · After feeding your baby, gently pat his or her back to let out any swallowed air. After your baby burps, offer the breast again, or offer the other breast. Sometimes a baby will want to keep feeding after being burped. When should you call for help? Call your doctor now or seek immediate medical care if: 
· You have symptoms of a breast infection, such as: 
¨ Increased pain, swelling, redness, or warmth around a breast. 
¨ Red streaks extending from the breast. 
¨ Pus draining from a breast. 
¨ A fever. · Your baby has no wet diapers for 6 hours. Watch closely for changes in your health, and be sure to contact your doctor if: 
· Your baby has trouble latching on to your breast. 
· You continue to have pain or discomfort when breastfeeding. · You have other questions or concerns. Where can you learn more? Go to http://greg-kelvin.info/. Enter P492 in the search box to learn more about \"Breastfeeding: Care Instructions. \" 
 Current as of: May 30, 2016 Content Version: 11.1 © 3810-8210 [employer]. Care instructions adapted under license by [employer] (which disclaims liability or warranty for this information). If you have questions about a medical condition or this instruction, always ask your healthcare professional. [address] any warranty or liability for your use of this information. Feeding Your [place]: Care Instructions Your Care Instructions Feeding a  is an important concern for parents. Experts recommend that newborns be fed on demand. This means that you breastfeed or bottle-feed your infant whenever he or she shows signs of hunger, rather than setting a strict schedule. Newborns follow their feelings of hunger. They eat when they are hungry and stop eating when they are full. Most experts also recommend breastfeeding for at least the first year. A common concern for parents is whether their baby is eating enough. Talk to your doctor if you are concerned about how much your baby is eating. Most newborns lose weight in the first several days after birth but regain it within a week or two. After 3weeks of age, your baby should continue to gain weight steadily. Follow-up care is a key part of your child's treatment and safety. Be sure to make and go to all appointments, and call your doctor if your child is having problems. It's also a good idea to know your child's test results and keep a list of the medicines your child takes. How can you care for your child at home? · Allow your baby to feed on demand. ¨ During the first 2 weeks, these feedings occur every 1 to 3 hours (about 8 to 12 feedings in a 24-hour period) for  babies. These early feedings may last only a few minutes. Over time, feeding sessions will become longer and may happen less often.  
¨ Formula-fed babies may have slightly fewer feedings, about 6 to 10 every 24 hours. They will eat about 2 to 3 ounces every 3 to 4 hours during the first few weeks of life. ¨ By 2 months, most babies have a set feeding routine. But your baby's routine may change at times, such as during growth spurts when your baby may be hungry more often. · You may have to wake a sleepy baby to feed in the first few days after birth. · Do not give any milk other than breast milk or infant formula until your baby is 1 year of age. Cow's milk, goat's milk, and soy milk do not have the nutrients that very young babies need to grow and develop properly. Cow and goat milk are very hard for young babies to digest. 
· Ask your doctor about giving a vitamin D supplement starting within the first few days after birth. · If you choose to switch your baby from the breast to bottle-feeding, try these tips. ¨ Try letting your baby drink from a bottle. Slowly reduce the number of times you breastfeed each day. For a week, replace a breastfeeding with a bottle-feeding during one of your daily feeding times. ¨ Each week, choose one more breastfeeding time to replace or shorten. ¨ Offer the bottle before each breastfeeding. When should you call for help? Watch closely for changes in your child's health, and be sure to contact your doctor if: 
· You have questions about feeding your baby. · You are concerned that your baby is not eating enough. · You have trouble feeding your baby. Where can you learn more? Go to http://greg-kelvin.info/. Enter [phone] in the search box to learn more about \"Feeding Your [place]: Care Instructions. \" Current as of: 2016 Content Version: 11.1 © 1874-4733 Pixc. Care instructions adapted under license by [employer]per[employer] (which disclaims liability or warranty for this information).  If you have questions about a medical condition or this instruction, always ask your healthcare professional. Rosina Barry, Incorporated disclaims any warranty or liability for your use of this information. Nutrition for Breastfeeding Mothers: Care Instructions Your Care Instructions When a woman breastfeeds her baby, she needs more nutrients to keep herself healthy and to make the baby's milk. Breastfeeding helps build the bond between you and your baby. It gives your baby excellent health benefits. A healthy diet includes eating a variety of foods from the basic food groups: grains, vegetables, fruits, milk and milk products (such as cheese and yogurt), and meat and dried beans. Eating well during breastfeeding will ensure that you stay healthy and your baby grows and develops normally. Follow-up care is a key part of your treatment and safety. Be sure to make and go to all appointments, and call your doctor if you are having problems. It's also a good idea to know your test results and keep a list of the medicines you take. How can you care for yourself at home? · Include 3 to 4 cups of nonfat or low-fat milk or milk products in your diet every day. These include: ¨ Milk (8 ounces equals 1 cup). ¨ Ice cream (1½ cups equals 1 cup of milk). ¨ Cheese (1½ ounces of cheese equals 1 cup). ¨ Yogurt (8 ounces equals 1 cup). · Eat at least 7 ounces of grains, such as cereals, breads, crackers, rice, or pasta, every day. One ounce is about 1 slice of bread, 1 cup of breakfast cereal, or ½ cup of cooked rice, cereal, or pasta. · Eat 3 cups of vegetables each day. Choices include: ¨ Dark-green vegetables such as broccoli and spinach. ¨ Orange vegetables such as carrots and sweet potatoes. ¨ Dried beans (such as bowman and kidney beans) and peas (such as lentils). · Every day, eat 2 cups of fresh, frozen, or canned fruit. · Eat 6½ ounces each day of protein, such as chicken, fish, lean meat, eggs, peanut butter, dried beans and peas, nuts, and seeds.  One egg, 1 tablespoon of peanut butter, or ½ ounce of nuts or seeds equals 1 ounce of protein. A ½ cup of cooked beans equals 2 ounces of protein. · Drink plenty of fluids, enough so that your urine is light yellow or clear like water. If you have kidney, heart, or liver disease and have to limit fluids, talk with your doctor before you increase the amount of fluids you drink. · Limit caffeine products, such as coffee, tea, chocolate, and some sodas. Caffeine can pass to your baby through breast milk. It may cause fussiness and sleep problems in babies. · Your doctor may recommend a vitamin supplement. Take it as recommended. · Consider joining a breastfeeding support group. These are offered at many hospitals and birthing centers by nurses, nurse-midwives, or lactation consultants. When should you call for help? Watch closely for changes in your health, and be sure to contact your doctor if: 
· You feel that you are not making enough milk for your baby. · You are losing a lot of weight. · You do not think your baby is gaining enough weight. · You would like help to plan a healthy diet. Where can you learn more? Go to http://greg-kelvin.info/. Enter P234 in the search box to learn more about \"Nutrition for Breastfeeding Mothers: Care Instructions. \" Current as of: May 30, 2016 Content Version: 11.1 © 1093-1963 ClassLink. Care instructions adapted under license by [employer] (which disclaims liability or warranty for this information). If you have questions about a medical condition or this instruction, always ask your healthcare professional. [place] [address] any warranty or liability for your use of this information. Robb Morales 
 
 
 [address] [place] [address] 
[phone] Patient: Laney Patiño MRN: [id number] :1985 Dewayne lew Documentación recientes Height  
  
  
  
  
  
 1.626 m Unresulted Labs Order Current Status SAMPLE TO BLOOD BANK In process Emergency Contacts Name Discharge Info Relation Home Work Mobile Tapan Gomes  Other Relative [6] [phone] Sobre [facility] hospitalización Le admitieron el:  February 27, 2017 [facility] tratamiento más reciente fue el:  SFM 3 MOTHER INFANT Le dieron de deepthi el:  March 2, 2017 Número de teléfono de la unidad:  [phone] Por qué le ingresaron [facility] diagnosis primaria es:  No está archivado/a [facility] diagnosis también incluye:  Pelvic Pain Affecting Pregnancy In Third Trimester, Antepartum Proveedores de verse mine anne marie hospitalizaciones Personal Médico Rol Especialidad Teléfono principal de la makayla Cardenas MD Attending Provider Family Practice [phone] [facility] médico de atención primaria (PCP ) Primary Care Physician Office Phone Office Fax 1010 [facility], [address] [place] [address] [phone] Follow-up Information Follow up With Details Comments Contact Info Janet Cardenas MD Schedule an appointment as soon as possible for a visit in 6 weeks  Macario Gillespie[address] 
[phone] Anne Marie citas Friday March 03, 2017  2:30 PM EST  
OB VISIT with Janet Cardenas MD  
[facility])  
 [address]  
[phone] Aprobación de la gestión actual lista de medicamentos EMPIECE a alejandro FilmMe Dosis e instrucciones Información de dispensación Comentarios Morning Noon Evening Bedtime  
 docusate sodium 100 mg capsule También conocido kathryn:  Ltanya Faheem Your next dose is: Today, Tomorrow Other:  _________  Dosis:  100 mg  
 Take 1 Cap by mouth two (2) times daily as needed for Constipation for up to 90 days. cantidad:  30 Cap  
recargas:  0  
     
   
   
   
  
 ibuprofen 800 mg tablet También conocido kathryn:  MOTRIN Your next dose is: Today, Tomorrow Other:  _________ Dosis:  800 mg Take 1 Tab by mouth every eight (8) hours. cantidad:  30 Tab  
recargas:  0 CONTINUAR estos medicamentos que no [place] Dosis e instrucciones Información de dispensación Comentarios Morning Noon Evening Bedtime PRENATAL PO Your next dose is: Today, Tomorrow Other:  _________ Take  by mouth. recargas:  0  
     
   
   
   
  
 sodium chloride 0.65 % nasal spray También conocido kathryn:  SALINE NASAL Your next dose is: Today, Tomorrow Other:  _________ Dosis:  1 Spray 1 spray by Both Nostrils route as needed for Congestion. cantidad:  30 mL  
recargas:  0 DEJE de alejandro estos medicamentos   
 acyclovir 400 mg tablet También conocido kathryn:  ZOVIRAX Dónde puede recoger dominique medicamentos Información sobre dónde obtener estos medicamentos se le dará a usted por la enfermera o el médico.   
 ! Pregunte a rodriguez médico o enfermero/a sobre estos medicamentos  
  docusate sodium 100 mg capsule  
 ibuprofen 800 mg tablet Discharge Instructions Patient Discharge Instructions Naveed Carmichael / [id number] : 1985 Admitted 2017 Discharged: 3/2/2017 Please bring this form with you to show your care provider at your follow-up appointment. Primary care provider:  Oswaldo Conteh MD 
 
Discharging provider:  Daniel Garcia MD  - Family Medicine Resident Suzette Garnett MD - Family Medicine Attending ACUTE DIAGNOSES: 
Pelvic pain affecting pregnancy in third trimester, antepartum FOLLOW-UP CARE RECOMMENDATIONS: 
 
 Follow-up Information Follow up With Details Comments Contact Info Vaibhav Collins MD Schedule an appointment as soon as possible for a visit in 6 weeks  Macario Alfonso [address] [place] [address] 
[phone] Continuing Therapy: 
- Please continue Motrin 800 mg, 1 tablet every 8 hours for the next 2 days, then 1 tablet every 8 hours as needed for pain - Please continue Colace 100 mg for constipation, take one tablet BID until having regular bowel movements - Please continue your prenatal vitamins as long as you are breastfeeding Follow-up tests needed: none Pending test results: At the time of your discharge the following test results are still pending: none Please make sure you review these results with your outpatient follow-up provider(s). Specific symptoms to watch for: chest pain, shortness of breath, fever, chills, nausea, vomiting, diarrhea, change in mentation, falling, weakness, bleeding. DIET/what to eat:  Regular Diet ACTIVITY:  
Activity Instructions Do not lift anything heavier than your baby for 6 weeks. Nothing in the vagina for 6 weeks. You are ok to drive as long as you are not taking Percocet or other narcotic pain medication (Motrin is ok). Wound care: you may shower, but do not soak your wound. You may remove your bandaid (steristrips) in 2-3 days if they have not fallen off yet. I understand that if any problems occur once I am at home I am to contact my physician. These instructions were explained to me and I had the opportunity to ask questions. I understand and acknowledge receipt of the instructions indicated above. Physician's or R.N.'s Signature                                                                  Date/Time Patient or Representative Signature                                                          Date/Time Después del parto (período de posparto): Después de la consulta [After Your Delivery (the Postpartum Period): After Your Visit] Instrucciones de cuidado Felicidades por el nacimiento de rodriguez bebé. Al igual que el Roseliamarii, el tiempo con el recién nacido puede ser un momento de [place], Judye Countess y agotamiento. Es posible que se sienta lovett al mirar la timo de rodriguez pequeño bebé. También podría sentirse abrumada por rodriguez nuevo ritmo de sueño y las nuevas responsabilidades. Al principio, los bebés suelen dormir mine el día y permanecen despiertos mine la noche. No tienen ningún patrón ni rutina. Podrían mundo gritos ahogados, sacudirse y despertarse, o parecer kathryn si tuvieran los ojos cruzados (bizcos). Todo esto es normal, e incluso la pueden hacer sonreír. Mine las primeras semanas siguientes al parto, trate de cuidarse rolf. Podría tardar de 4 a 6 semanas en volver a sentirse usted misma, y posiblemente más tiempo si le mon hecho sima cesárea. Es probable que se sienta muy fatigada mine varias semanas. Anne Marie días estarán llenos de Zena[place], parag también de mucha alegría. La atención de seguimiento es sima parte clave de rodriguez tratamiento y seguridad. Asegúrese de hacer y acudir a todas las citas, y llame a rodriguez médico si está teniendo problemas. También es sima buena idea saber los resultados de los exámenes y mantener sima lista de los medicamentos que navin. Cómo puede cuidarse en el hogar? Cuide rodriguez cuerpo después del parto · Utilice toallas sanitarias en vez de tampones para las pérdidas de stacey que podrían durar hasta 2 semanas. · Alivie los cólicos con ibuprofeno (Advil, Motrin).  
· Alivie el dolor de las hemorroides y la pieter entre la vagina y el recto con compresas de hielo o de infusión de hamamelis virginiana (\"witch hazel\"). · Alivie el estreñimiento bebiendo abundante líquido y comiendo alimentos ricos en fibra. Pregúntele a rodriguez médico acerca de los ablandadores de heces de [place]. · Límpiese con un chorrito suave de agua tibia de sima botella en vez de hacerlo con papel higiénico. 
· Sudley un baño de asiento en agua tibia varias veces al día. · Use un buen sostén de lactancia. Alivie el dolor y la hinchazón de los senos con toallitas de aseo húmedas tibias. · Si no está amamantando, utilice hielo en lugar de calor para aliviar el dolor de los senos. · Si está amamantando, rodriguez período menstrual podría no comenzar hasta después de varios meses. Es posible que Chi, y más tiempo al principio, de kathryn lo hacía antes del Michael Dais. · Espere hasta que haya sanado (de 4 a 6 semanas) antes de volver a tener relaciones sexuales. Rodriguez médico le dirá cuándo puede tener relaciones sexuales. · Trate de no viajar con el bebé mine las primeras 5 o 6 semanas. Si hace un viaje marcus en automóvil, alfredito paradas frecuentes para caminar y estirarse. Evite el agotamiento · Descanse todos los días. Trate de dormir la siesta cuando rodriguez bebé también lo hace. · Pídale a otro adulto que la acompañe por unos favio después del [place]. · Planifique el cuidado de los niños si tiene otros hijos. · Sea flexible para que pueda comer a horas fuera de lo común y dormir cuando lo necesite. Tanto usted kathryn rodriguez bebé están creando horarios nuevos. · Planee pequeñas salidas para estar afuera de la casa. El cambio podría hacer que se sienta menos fatigada. · Pida ayuda para cocinar y [address] [facility] hogar y las compras. Recuerde que rodriguez principal tarea consiste en cuidar a rodriguez bebé. Conozca la ayuda que puede recibir en mckenna de tener depresión posparto · La depresión posparto es común mine las primeras 1 a 2 semanas siguientes al nacimiento. Podría llorar o sentirse bernice o irritable sin razón alguna. · Descanse cada vez que pueda hacerlo. Estar fatigada le dificulta manejar dominique emociones. · Salga a caminar con rodriguez bebé. · Hable con rodriguez deonte, dominique amigos y dominique familiares acerca de dominique sentimientos. · Si dominique síntomas rivera más de unas pocas semanas, o si se siente muy deprimida, pídale ayuda a rodriguez médico. 
· La depresión posparto puede tratarse. Los grupos de apoyo y la asesoría psicológica pueden ser de Oseas Aleman. A veces, los medicamentos también pueden ayudar. Manténgase saludable · Coma alimentos sanos para tener más energía, producir sima buena [place] materna y adelgazar las libras adicionales que engordó con el bebé. · Si amamanta, evite el alcohol y las drogas. No fume. Si dejó de fumar mine el embarazo, felicitaciones. · Inicie ejercicios diarios después de 4 a 6 semanas, parag descanse cuando se sienta fatigada. · Aprenda ejercicios para tonificar el abdomen. Braulio los ejercicios de Kegel para recuperar la fuerza de los músculos pélvicos. Puede hacer los ejercicios de Kegel mientras está de pie o sentada. ¨ Apriete los mismos músculos que usted usaría para detener la [place]. El vientre y las nalgas (glúteos) no deben moverse. ¨ Manténgalos apretados mine 3 segundos, luego relájelos otros 3 segundos. ¨ Repita el ejercicio entre 10 y 13 veces cada sesión. Braulio zuleima o más sesiones cada día. · Busque sima clase para nuevas madres y recién nacidos que tenga un tiempo de ejercicio. · Si le mon hecho sima cesárea, dese un poco más de tiempo antes de hacer ejercicio, y tenga cuidado. Cuándo debe pedir ayuda? Llame al 911 en cualquier momento que considere que necesita atención de emergencia. Por ejemplo, llame si: 
· Se desmayó (perdió el conocimiento). Llame a rodriguez médico ahora mismo o busque atención médica inmediata si: · Tiene sangrado vaginal intenso.  Selden significa que está expulsando coágulos sanguíneos y empapando sima toalla sanitaria cada hora por 2 horas o más. · Está mareada o aturdida, o siente kathryn que se puede [address] · Tiene fiebre. · Tiene dolor abdominal nuevo o que empeora. Preste especial atención a los cambios en rodriguez ladan y asegúrese de comunicarse con rodriguez médico si: 
· Rodriguez sangrado vaginal parece volverse más intenso. · Tiene flujo vaginal nuevo o que empeora. · Se siente bernice, ansiosa o sin esperanzas mine más de unos pocos días. · No mejora kathryn se esperaba. Dónde puede encontrar más información en inglés? Rigo Walker a DealExplorer.be Mellissa Merritt P635 en la búsqueda para aprender [employer] de \"Después del parto (período de posparto): Después de la consulta. \"  
© 6912-2096 Healthwise, Incorporated. Instrucciones de cuidado adaptadas bajo licencia por Brandee Peralta (which disclaims liability or warranty for this information). Estas instrucciones de cuidado son para usarlas con rodriguez profesional clínico registrado. Si tiene preguntas acerca de sima afección médica o de estas instrucciones, pregunte siempre a rodriguez profesional de [employer]. Healthwise, Incorporated niega cualquier garantía o responsabilidad por rodriguez uso de esta información. Versión del contenido: [phone]; Última revisión: 20 marzo, 2012 [employer] Activation Thank you for requesting access to [employer]. Please follow the instructions below to securely access and download your online medical record. [employer] allows you to send messages to your doctor, view your test results, renew your prescriptions, schedule appointments, and more. How Do I Sign Up? 1. In your internet browser, go to [email or web address] 
2. Click on the First Time User? Click Here link in the Sign In box. You will be redirect to the New Member Sign Up page. 3. Enter your QR Pharma Access Code exactly as it appears below. You will not need to use this code after youve completed the sign-up process.  If you do not sign up before the expiration date, you must request a new code. [facility] Access Code: [email or web address] Expires: 2017  9:48 AM (This is the date your [facility] access code will ) 4. Enter the last four digits of your Social Security Number (xxxx) and Date of Birth (mm/dd/yyyy) as indicated and click Submit. You will be taken to the next sign-up page. 5. Create a [facility] ID. This will be your [facility] login ID and cannot be changed, so think of one that is secure and easy to remember. 6. Create a [facility] password. You can change your password at any time. 7. Enter your Password Reset Question and Answer. This can be used at a later time if you forget your password. 8. Enter your e-mail address. You will receive e-mail notification when new information is available in [address] 9. Click Sign Up. You can now view and download portions of your medical record. 10. Click the Download Summary menu link to download a portable copy of your medical information. Additional Information If you have questions, please visit the Frequently Asked Questions section of the [facility] website at [email or web address] Remember, [facility] is NOT to be used for urgent needs. For medical emergencies, dial 911. Discharge Instructions Attachments/References BABY-FRIENDLY BREASTFEEDING: GENERAL INFO (Lao) BREASTFEEDING (Lao) FEEDING: : PEDIATRIC (Lao) BREASTFEEDING MOTHERS: NUTRITION (Lao) Discharge Orders [employer] Announcement We are excited to announce that we are making your provider's discharge notes available to you in [facility]. You will see these notes when they are completed and signed by the physician that discharged you from your recent hospital stay.   If you have any questions or concerns about any information you see in [facility], please call the [employer] [facility] [employer] Information Department where you were seen or reach out to your Primary Care Provider for more information about your plan of care. Introducing [facility]! [facility] introduce portal paciente MyChart . Ahora se puede acceder a partes de rodriguez expediente médico, enviar por correo electrónico la oficina de rodriguez médico y solicitar renovaciones de medicamentos en línea. En rodriguez navegador de Internet , Tristin Ricci a [email or web address] Alfredito clic en el usuario por Robb Beat? Vonne Garry clic aquí en la sesión [facility]. Verá la página de registro [place]. Ingrese rodriguez código de Bank  Gabrielle manuel y kathryn aparece a continuación. Usted no tendrá que [employer] código después de maryuri completado el proceso de registro . Si usted no se inscribe antes de la fecha de caducidad , debe solicitar un nuevo código. · MyChart Código de acceso : [id number] Expires: 4/20/2017  9:48 AM 
 
Ingresa los últimos cuatro dígitos de rodriguez Número de Seguro Social ( xxxx ) y fecha de nacimiento ( dd / mm / aaaa ) kathryn se indica y alfredito clic en Enviar. Usted será llevado a la siguiente página de registro . Crear un ID MyChart . Esta será rodriguez ID de inicio de sesión de MyChart y no puede ser [place] , por lo que pensar en sima que es Rusty Oliverio y fácil de recordar . Crear sima contraseña MyChart . Usted puede cambiar rodriguez contraseña en cualquier momento . Ingrese rodriguez Password Reset de preguntas y Vogel . Wynot se puede utilizar en un momento posterior si usted olvida rodriguez contraseña. Introduzca rodriguez dirección de correo electrónico . Parminder Ortega recibirá sima notificación por correo electrónico cuando la nueva información está disponible en MyChart . Mario Pillar clic en Registrarse. Di Mings milagro y descargar porciones de rodriguez expediente médico. 
Alfredito clic en el enlace de descarga del menú Resumen para descargar sima copia portátil de rodriguez información médica . Si tiene [employer] , por favor visite la sección de preguntas frecuentes del sitio web YellowPepper . Nuno, Tracey NO es que se utilizará para las necesidades urgentes. Para emergencias médicas , llame al 911 . Ahora disponible en rodriguez iPhone y Android ! General Information Please provide this summary of care documentation to your next provider. Patient Signature:  ____________________________________________________________ Date:  ____________________________________________________________  
  
Martha John Provider Signature:  ____________________________________________________________ Date:  ____________________________________________________________ More Information Aprenda sobre cómo empezar a amamantar - [ Jatin Casillas to Vianca ] Cómo planificar con antelación 
 
Planee por adelantado, antes de que nazca rodriguez bebé. Aprenda todo lo que pueda acerca del amamantamiento. [place] le facilitará el amamantamiento. · A principios de rodriguez embarazo, hable con rodriguez médico o comadrona (partera) sobre el amamantamiento. · Aprenda los conceptos básicos antes de que nazca rodriguez bebé. El personal en hospitales y centros de maternidad puede ayudarla a encontrar un especialista en lactancia. Esta persona suele ser Christ Expose enfermera que está capacitada para enseñar y aconsejar a las mujeres sobre el amamantamiento. O rolf, puede alejandro DIRECTV de [place]. · Planee con anticipación los momentos en los que necesitará ayuda después de que nazca rodriguez bebé. Muchas mujeres reciben ayuda de dominique familiares y [place]. Algunas se unen a un grace de apoyo para hablar con otras madres que amamantan a dominique bebés. · Compre los suministros que Jayy Beatriz a necesitar. Por ejemplo, almohadillas de lactancia, crema para los pezones, almohadas adicionales y sostenes de lactancia. [place] también sobre extractores Sim Svetlana. Erlinda del hospital o del centro de maternidad Es importante que tenga apoyo de los médicos, las enfermeras y el personal hospitalario que los cuidan a usted y a rodriguez bebé. Antes de que llegue el momento de mundo a milan, pregunte sobre las políticas de lactancia de rodriguez hospital o [facility] un hospital o centro de maternidad que tenga un reglamento para: · Alojamiento conjunto (\"Rooming in\"). Esta política es favorable a que usted tenga a rodriguez bebé [employer] habitación. Puede permitirle amamantar con más frecuencia. · Alimentación suplementaria. Informe al personal de que rodriguez bebé debe recibir [employer] materna desde rodriguez nacimiento. Si el personal le da a rodriguez bebé agua, sima solución azucarada o [place] de fórmula inmediatamente después de nacer sin razón Mary, esto puede dificultarle a usted el amamantamiento. · Chupones o pezones artificiales. El personal no debería darle a rodriguez recién nacido estos artículos sin rodriguez permiso. Podrían interferir con el amamantamiento. · Seguimiento. Pregunte si el hospital puede ayudarla con problemas de amamantamiento sima vez que Keisha Dubonnet a rodriguez hogar. Trate de [employer] Corporation grupos de apoyo u otro tipo de contacto. Kremarilia Prose le elpidio útiles si necesita ayuda para establecer y mantener sima rutina de amamantamiento. Rodriguez primera navin Lo mejor es comenzar el amamantamiento dentro de 1 hora después del [place]. En cada navin, usted pasa por estos pasos básicos: · Prepárese para la alimentación. Manténgase calmada y [place], y trate de no distraerse. Tenga agua o jugo a mano para alejandro usted. Radha Johnson Incorporated o zuleima almohadas para ayudar a sostener al bebé [employer] se [place]. · Encuentre sima posición para amamantar que sea cómoda tanto para usted kathryn para rodriguez bebé. Los ejemplos incluyen la posición de cuna y la posición de fútbol americano. Asegúrese de que la pepe y el pecho del bebé estén alineados y orientados hacia rodriguez pecho. Es mejor cambiar el seno con el que Georgine Shallow. · Consiga que el bebé se prenda apropiadamente. La boca del bebé debe estar rolf abierta, kathryn en un bostezo, por lo que puede que tenga que tocar delicadamente el centro del labio inferior de rodriguez bebé. Cuando la boca del bebé esté rolf abierta, acerque al bebé rápidamente al pezón y a la areola. La areola es la pieter oscura alrededor del pezón. · Efrem al bebé sima navin completa. Deje que el bebé teo mine al menos 15 minutos. Asegúrese de hacer eructar al bebé después de alimentarse de cada seno. En los primeros días después del nacimiento, anne marie senos elaboran un líquido espeso de color amarillo llamado calostro. Stacie líquido le proporciona al bebé nutrientes y anticuerpos para combatir las infecciones. Es todo lo que los bebés necesitan al principio. Anne Marie senos se llenarán de [place] unos días después del nacimiento. Hable de inmediato con rodriguez médico, comadrona o especialista en lactancia si tiene problemas y no sabe qué hacer. Frecuencia con la que [employer]Star Amamante a rodriguez bebé cuando lo pida en lugar de establecer un horario estricto. Mine los primeros días, espere amamantar con sima frecuencia de 1 a 3 horas. [place] suele ser alrededor de 8 a 12 veces en un período de 24 horas. Despierte a un bebé que esté dormido para alimentarlo, si es necesario. Si amamanta con más frecuencia, esto ayudará a que anne marie senos produzcan Michael Doss. Después de la vuelta al hogar Después de que vuelva a rodriguez hogar, no dude en llamar a rodriguez médico, comadrona o especialista en lactancia si tiene preguntas. Debería hacer esto incluso si no sabe cuál es el problema. Estos profesionales están acostumbrados a que [employer] padres de recién nacidos. Pueden ayudarla a averiguar si hay un problema y, si es así, a solucionarlo. Planee los momentos en que usted estará separada del bebé. Use un sacaleches para extraerse leche con anticipación. Puede almacenar la KB Home	[place] refrigerador o en el congelador.  Así estará preparada Hortencia [place] persona cuide a rodriguez bebé. Amamantar es sima habilidad que se aprende y se torna más fácil con el tiempo. Usted tiene más probabilidades de tener éxito si planea con anticipación, aprende las técnicas básicas y [facility] ([place]) dónde [place] y [de-identified]. Dónde puede encontrar más información en inglés? Caroline Pablo a http://greg-kelvin.info/. Ying Barretos B007 en la búsqueda para aprender más acerca de \"Aprenda sobre cómo empezar a amamantar - [ Chintan Mckinney About Starting to Breastfeed ]. \" 
Revisado: 30 mayo, 2016 Versión del contenido: 11.1 © 0530-4115 Healthwise, Incorporated. Las instrucciones de cuidado fueron adaptadas bajo licencia por Good [employer]f Taxi Connections (which disclaims liability or warranty for this information). Si usted tiene [place] afección médica o sobre estas instrucciones, siempre pregunte a rodriguez profesional de ladan. Healthwise, Incorporated niega toda garantía o responsabilidad por rodriguez uso de esta información. Lactancia: Instrucciones de cuidado - [ Breastfeeding: Care Instructions ] Instrucciones de cuidado Amamantar tiene muchos beneficios. Puede disminuir las posibilidades de que rodriguez bebé contraiga sima infección. También puede prevenir que rodriguez bebé tenga problemas kathryn diabetes y colesterol alto en un futuro. Amamantar también la ayuda a establecer melissa afectivos con rodriguez bebé. [facility] recomienda amamantar al menos un año. [place] podría ser muy difícil de hacer para muchas mujeres, parag amamantar incluso por un período más corto de tiempo es un beneficio para rodriguez ladan y la de rodriguez bebé. Mine los primeros días después del nacimiento, anne marie senos producen un líquido espeso y amarillento llamado calostro. Stacie líquido le suministra a rodriguez bebé nutrientes y anticuerpos contra las infecciones. Eso es todo lo que los bebés necesitan mine los primeros días después del nacimiento. Anne Marie senos se llenarán de [employer] unos días después del nacimiento. Amamantar es sima habilidad que mejora con la práctica. Es común tener [employer]. Algunas mujeres tienen los pezones adoloridos o agrietados, obstrucción de los conductos de la leche o infección en los senos (mastitis). Donnell si alimenta a rodriguez bebé con sima frecuencia de 1 a 2 horas mine el día, y sigue los consejos que se ofrecen en esta hoja, es posible que no tenga estos problemas. Puede tratar estos problemas si se presentan y continuar amamantando. La atención de seguimiento es sima parte clave de rodriguez tratamiento y seguridad. Asegúrese de hacer y acudir a todas las citas, y llame a rodriguez médico si está teniendo problemas. También es sima buena idea saber los resultados de los exámenes y mantener sima lista de los medicamentos que navin. Cómo puede cuidarse en el hogar? · Amamante a rodriguez bebé cada vez que tenga hambre. Mine las primeras 2 semanas, rodriguez bebé pedirá alimento con sima frecuencia de 1 a 3 horas. [place] la ayudará a mantener rodriguez Ace Gambles. · Ponga sima almohada o sima almohada de lactancia en rodriguez regazo para apoyar los brazos y a rodriguez bebé. · Sostenga a rodriguez bebé en sima posición cómoda. ¨ Puede sostener a rodriguez bebé de diversas formas. Sima de las posiciones más comunes es la de la cuna. Un brazo sostiene al bebé con la pepe en la curva de rodriguez codo. Rodriguez mano abierta sostiene las nalgas o la espalda del bebé. El vientre de rodriguez bebé reposa sobre el suyo. ¨ Si tuvo a rodriguez bebé por cesárea, trate de sostenerlo en la posición de fútbol americano. Esta posición mantiene a rodriguez bebé fuera de rodriguez vientre. Coloque a rodriguez bebé bajo rodriguez brazo, con rodriguez cuerpo a lo marcus del lado donde lo amamantará. Sostenga la parte superior del cuerpo de rodriguez bebé con rodriguez Fredrick Lessen. Con pietro mano usted puede controlar la pepe de rodriguez bebé para llevar la boca a rodriguez seno. ¨ Pruebe diferentes posiciones con cada sesión de alimentación. Si está teniendo [place], pídale ayuda a rodriguez médico o a un asesor de lactancia. · Para conseguir que rodriguez bebé se prenda: 
¨ Sostenga el seno y estréchelo formando sima \"U\" con la mano, con rodriguez pulgar al [employer] exterior del seno y los otros dedos [address] interior. Janeece Rose formar [facility] \"C\" con la mano, con el pulgar sobre el pezón y los otros dedos debajo del pezón. Pruebe las SUPER[employer] INC de sostenerlo para obtener la mejor prendida para toda posición de DIRECTV use. Rodriguez otro brazo estará detrás de la espalda del bebé, con rodriguez mano dando apoyo a la base de la pepe del bebé. Ubique el pulgar y los otros dedos de la mano de manera que apunten hacia las orejas de rodriguez bebé. ¨ Puede tocar el labio inferior de rodriguez bebé con rodriguez pezón para conseguir que rodriguez bebé max la boca. Espere hasta que rodriguez bebé la max ampliamente, kathryn en un bostezo dacia. Y luego asegúrese de acercar a rodriguez bebé rápidamente hacia el seno, en vez de rodriguez seno hacia el bebé. A medida que acerca a rodriguez bebé al seno, use la otra mano para sostener el seno y guiarlo dentro de la boca del bebé. ¨ Tanto el pezón kathryn sima gran parte de la pieter más oscura alrededor del pezón (areola) deben estar en la boca del bebé. Los labios del bebé deben estar doblados hacia afuera, no doblados hacia adentro (invertidos). ¨ Escuche y verifique que haya un patrón regular al succionar y tragar mientras el bebé se está alimentando. Si no puede milagro ni escuchar un patrón al tragar, observe las orejas del bebé, que se moverán levemente cuando el bebé traga. Si le parece que rodriguez seno obstruye la nariz del bebé, incline la pepe del bebé ligeramente hacia atrás, para que únicamente el borde de sima fosa nasal esté despejado para respirar. ¨ Cuando rodriguez bebé se prenda, generalmente puede dejar de sostener el seno con rodriguez mano y llevarla bajo rodriguez bebé para acunarlo. Ahora, simplemente relájese y amamante a rodriguez bebé. · Usted sabrá que rodriguez bebé se está alimentando rolf cuando: ¨ Rodriguez boca cubre sima buena parte de la areola y los labios están doblados Anand Pitcher. ¨ La barbilla y la nariz descansan sobre oakley seno. ¨ La succión es profunda, rítmica y con pausas cortas. ¨ Puede milagro y oír cómo traga oakley bebé. ¨ No siente dolor en el pezón. · Si oakley bebé solo navin de un seno en cada sesión, comience la siguiente en el otro. · Cada vez que necesite retirar al bebé de oakley seno, póngale un dedo en la comisura de la boca. Empuje el dedo entre las encías del bebé para interrumpir la succión con suavidad. Si no desprende la boca del bebé de oakley seno antes de retirar a oakley bebé, dominique pezones pueden ponerse doloridos, agrietados o amoratados. · Después de alimentar a oakley bebé, sivan unas palmaditas suaves en la espalda para que pueda sacar el aire que haya tragado. Después de que el bebé eructe, vuélvale a ofrecer el mismo seno o el otro. A veces, el bebé querrá continuar alimentándose después de maryuri eructado. Cuándo debe pedir ayuda? Llame a oakley médico ahora mismo o busque atención médica inmediata si: · Tiene síntomas de sima infección en el seno, tales kathryn: ¨ Mayor dolor, hinchazón, enrojecimiento o temperatura alrededor del seno. ¨ Vetas rojizas que se extienden del seno. ¨ Pus que supura del seno. Cristopher Cantor. · Oakley bebé no ha mojado pañales mine 6 horas. Preste especial atención a los cambios en oakley ladan y asegúrese de comunicarse con oakley médico si: 
· Oakley bebé tiene dificultades para prenderse al seno. · Usted continúa sintiendo dolor o incomodidad al [employer]. · Tiene otras preguntas o inquietudes. Dónde puede encontrar más información en inglés? Amanda Hyde a http://greg-kelvin.info/. Iman Iraheta P492 en la búsqueda para aprender más acerca de \"Lactancia: Instrucciones de cuidado - [ Breastfeeding: Care Instructions ]. \" 
Revisado: 30 cruz, 2016 Versión del contenido: 11.1 © 2590-9149 Greenbox, Incorporated.  Las instrucciones de cuidado fueron adaptadas bajo licencia por Good Help Connections (which disclaims liability or warranty for this information). Si usted tiene [place] afección médica o sobre estas instrucciones, siempre pregunte a rodriguez profesional de ladan. [facility], Incorporated niega toda garantía o responsabilidad por rodriguez uso de esta información. Alimentación de rodriguez recién nacido: Instrucciones de cuidado - [ Feeding Your : Care Instructions ] Instrucciones de cuidado Drena Harps a un recién nacido es sima cuestión importante para los [place]. Los expertos recomiendan que los recién nacidos elpidio alimentados cuando lo pidan. Herman significa amamantar o darle biberón a rodriguez bebé cuando muestre señales de hambre, en lugar de establecer un horario estricto. Los recién nacidos responden a dominique sensaciones de [place]. Comen cuando tienen hambre y phill de comer cuando están llenos. La mayoría de los expertos también recomiendan amamantar mine al menos el primer año. Sima preocupación común para los padres es si rodriguez bebé está comiendo lo suficiente. Hable con rodriguez médico si está preocupada por cuánto está comiendo rodriguez bebé. La mayoría de los recién nacidos prestonAdteractive Corporation primeros días después del nacimiento, [place] lo recuperan en sima [facility]. Después de las  [facility], rodriguez bebé debe continuar aumentando de peso de forma jorge. La atención de seguimiento es sima parte clave del tratamiento y la seguridad de rodriguez hijo. Asegúrese de hacer y acudir a todas las citas, y llame a rodriguez médico si rodriguez hijo está teniendo problemas. También es sima buena idea saber los resultados de los exámenes de rodriguez hijo y mantener sima lista de los medicamentos que navin. Cómo puede cuidar a rodriguez hijo en el hogar? · Permita a rodriguez bebé que se alimente cuando lo pida. Loz[place]la[place] 172 primeras 2 semanas, estas ashley tienen lugar cada 1 a 3 horas (alrededor de 8 a 12 veces en un período de 24 horas) para los bebés Egoscue.  Estas primeras sesiones de amamantamiento pueden durar sólo unos minutos. Con el tiempo, las sesiones se irán haciendo más largas y podrían tener lugar con menos frecuencia. ¨ Es posible que los bebés que se alimentan con leche de fórmula necesiten hacerlo con sima frecuencia un poco varinder, aproximadamente entre 6 y 10 veces cada 24 horas. [place]erán de 2 a 3 onzas (60 a 90 ml) cada 3 a 4 horas mine las primeras semanas de naveen. ¨ A los 2 meses, la mayoría de los bebés tienen sima rutina de alimentación establecida. Donnell a veces la rutina de rodriguez bebé puede cambiar, Fanny, por [place], mine los períodos de crecimiento acelerado cuando rodriguez bebé podría tener hambre más a menudo. · Es posible que deba despertar a rodriguez bebé para alimentarle mine los primeros días posteriores al nacimiento. · No le dé ningún otro tipo de [employer] no sea [address] o de fórmula hasta que rodriguez bebé cumpla 1 año de [place]. La leche de [place], la [place] de cabra y la leche de soya no tienen los nutrientes que necesitan los niños muy pequeños para crecer y desarrollarse adecuadamente. Doyal Blanks de marianne y de [place] son muy difíciles de digerir para los bebés pequeños. · Pregúntele a rodriguez médico acerca de darle un suplemento de vitamina D a partir de los primeros días después del nacimiento. · Si decide que rodriguez bebé pase del amamantamiento a la alimentación con biberón, pruebe estas sugerencias. ¨ Pruebe que david de un biberón. Poco a poco reduzca el número de veces que le amamanta cada día. Mine sima semana, sustituya un amamantamiento por alimentación con biberón en chavez de dominique períodos de alimentación diaria. ¨ Cada semana, elija otra sesión de amamantamiento para sustituir o para reducir. ¨ Ofrézcale el biberón antes de cada amamantamiento. Cuándo debe pedir ayuda? Preste especial atención a los [employer] ladan de rodriguez hijo y asegúrese de comunicarse con rodriguez médico si: · Tiene preguntas acerca de la alimentación de rodriguez bebé. · Está preocupada de que rodriguez bebé no esté comiendo lo suficiente. · Tiene problemas para alimentar a rodriguez bebé. Dónde puede encontrar más información en inglés? Gigi Imam a http://greg-kelvin.info/. Catia Heaton A665 en la búsqueda para aprender más acerca de \"Alimentación de rodriguez recién nacido: Instrucciones de cuidado - [ Feeding Your [place]: Care Instructions ]. \" 
Revisado: 2016 Versión del contenido: 11.1 © 6658-6325 Healthwise, Incorporated. Las instrucciones de cuidado fueron adaptadas bajo licencia por Good B[employer]Vax Connections (which disclaims liability or warranty for this information). Si usted tiene [place] afección médica o sobre estas instrucciones, siempre pregunte a rodriguez profesional de ladan. Healthwise, Incorporated niega toda garantía o responsabilidad por rodriguez uso de esta información. Nutrición para madres que amamantan: Instrucciones de cuidado - [ Nutrition for Breastfeeding Mothers: Care Instructions ] Instrucciones de cuidado Cuando sima moiz [place] a rodriguez bebé, necesita sima mayor cantidad de nutrientes para mantenerse saludable y poder producir leche para el bebé. Amamantar le ayuda a construir un vínculo entre usted y rodriguez bebé. Aletha Raspberry a rodriguez bebé excelentes beneficios de ladan. Sima alimentación ruth consiste en comer diversos alimentos de los grupos básicos: granos, verduras, frutas, [place] y productos lácteos (kathryn queso y yogur), y carne y frijoles ([address]) secos. Alimentarse rolf mine el amamantamiento asegura que usted se [place] ruth y que rodriguez bebé crezca y se desarrolle normalmente. La atención de seguimiento es sima parte clave de rodriguez tratamiento y seguridad. Asegúrese de hacer y acudir a todas las citas, y llame a rodriguez médico si está teniendo problemas. También es sima buena idea saber los resultados de los exámenes y mantener sima lista de los medicamentos que navin. Cómo puede cuidarse en el hogar?  
· Incluya 3 a 4 tazas de [employer] o semidescremada o productos lácteos bajos en grasa o sin grasa en oakley dieta todos los días. Estos incluyen: ¨ Leche (8 onzas equivalen a 1 taza). ¨ Helado (1½ tazas equivalen a 1 taza de [place]). ¨ Queso (1½ onzas de queso [place] a 1 taza). ¨ Yogur (8 onzas equivalen a 1 taza). · Coma por lo menos 7 onzas de granos, kathryn cereales, panes, galletas saladas, arroz o pasta, todos los GRASSE. [place] onza (28 gramos) equivale aproximadamente a 1 rebanada de pan, 1 taza de cereales para desayunar, o ½ taza de arroz, cereal o pasta cocidos. · Consuma 3 tazas de verduras cada día. Anne Marie opciones incluyen: ¨ Verduras de color chad oscuro, kahtryn brócoli y espinacas. ¨ Verduras de color naranja, kathryn zanahorias y camotes (batata, boniato). ¨ Frijoles secos (kathryn frijoles pintos y [place]) y arvejas ([address] [place] [address]). · Cada día coma 2 tazas de fruta fresca, congelada o enlatada. · Coma 6½ onzas de proteína todos los días, kathryn je, pesboby, carne ROBIN Alvarez, [place]rera de cacahuate [place]), frijoles secos y chícharos (arvejas), nueces y semillas. Un huevo, 1 cucharada de mantequilla de cacahuate (maní), o ½ onza de nueces o semillas equivale a 1 onza (28 gramos) de proteína. Media (½) taza de frijoles cocidos equivale a 2 onzas (64 gramos) de proteína. · Nikki abundantes líquidos, los suficientes kathryn para que oakley orina sea de color [place] saji o transparente kathryn el agua. Si tiene [facility] [employer], el corazón o el hígado y tiene que [place] líquidos, hable con oakley médico antes de aumentar oakley consumo. · Limite los productos con cafeína, tales kahtryn café, té, chocolate y algunas bebidas gaseosas. Oakley bebé puede recibir la cafeína a través de la [place]. Attalla podría provocar agitación y problemas para dormir en los bebés. · Oakley médico podría recomendarle un suplemento de vitaminas. Tómelo según lo recomendado. · Considere la idea de unirse a un grace de apoyo de [place] que [place]. Estos grupos los ofrecen enfermeras, enfermeras parteras o asesoras de lactancia en muchos hospitales o centros [facility]. Cuándo debe pedir ayuda? Preste especial atención a los cambios en rodriguez ladan y asegúrese de comunicarse con rodriguez médico si: 
· Siente que no produce suficiente leche para rodriguez bebé. · [address]. · Piensa que rodriguez bebé no está aumentando de [employer]. · Le gustaría recibir ayuda para planear sima alimentación saludable. Dónde puede encontrar más información en inglés? Saji Murray a http://greg-kelvin.info/. Escriba P234 en la búsqueda para aprender más acerca de \"Nutrición para madres que amamantan: Instrucciones de cuidado - [ Nutrition for Breastfeeding Mothers: Care Instructions ]. \" 
Revisado: 30 [address], 2016 Versión del contenido: 11.1 © 1019-1208 Healthwise, Incorporated. Las instrucciones de cuidado fueron adaptadas bajo licencia por [employer] (which disclaims liability or warranty for this information). Si usted tiene [place] Blessing afección médica o sobre estas instrucciones, siempre pregunte a rodriguez profesional de ladan. Healthwise, Incorporated niega toda garantía o responsabilidad por rodriguez uso de esta información.

## 2017-02-27 NOTE — IP AVS SNAPSHOT
Current Discharge Medication List  
  
Take these medications at their scheduled times Dose & Instructions Dispensing Information Comments Morning Noon Evening Bedtime  
 ibuprofen 800 mg tablet Commonly known as:  MOTRIN Your next dose is: Today, Tomorrow Other:  ____________ Dose:  800 mg Take 1 Tab by mouth every eight (8) hours. Quantity:  30 Tab Refills:  0 Take these medications as needed Dose & Instructions Dispensing Information Comments Morning Noon Evening Bedtime  
 docusate sodium 100 mg capsule Commonly known as:  Earle Deleonh Your next dose is: Today, Tomorrow Other:  ____________ Dose:  100 mg Take 1 Cap by mouth two (2) times daily as needed for Constipation for up to 90 days. Quantity:  30 Cap Refills:  0  
     
   
   
   
  
 sodium chloride 0.65 % nasal spray Commonly known as:  SALINE NASAL Your next dose is: Today, Tomorrow Other:  ____________ Dose:  1 Spray 1 spray by Both Nostrils route as needed for Congestion. Quantity:  30 mL Refills:  0 Take these medications as directed Dose & Instructions Dispensing Information Comments Morning Noon Evening Bedtime PRENATAL PO Your next dose is: Today, Tomorrow Other:  ____________ Take  by mouth. Refills:  0 Where to Get Your Medications Information about where to get these medications is not yet available ! Ask your nurse or doctor about these medications  
  docusate sodium 100 mg capsule  
 ibuprofen 800 mg tablet

## 2017-02-28 PROCEDURE — 74011250637 HC RX REV CODE- 250/637: Performed by: FAMILY MEDICINE

## 2017-02-28 PROCEDURE — 0HQ9XZZ REPAIR PERINEUM SKIN, EXTERNAL APPROACH: ICD-10-PCS | Performed by: OBSTETRICS & GYNECOLOGY

## 2017-02-28 PROCEDURE — 77030018749 HC HK AMNIO DISP DERY -A

## 2017-02-28 PROCEDURE — 75410000003 HC RECOV DEL/VAG/CSECN EA 0.5 HR: Performed by: OBSTETRICS & GYNECOLOGY

## 2017-02-28 PROCEDURE — 65270000029 HC RM PRIVATE

## 2017-02-28 PROCEDURE — 75410000000 HC DELIVERY VAGINAL/SINGLE: Performed by: OBSTETRICS & GYNECOLOGY

## 2017-02-28 PROCEDURE — 74011000250 HC RX REV CODE- 250

## 2017-02-28 PROCEDURE — 75410000002 HC LABOR FEE PER 1 HR: Performed by: OBSTETRICS & GYNECOLOGY

## 2017-02-28 PROCEDURE — 74011250636 HC RX REV CODE- 250/636: Performed by: OBSTETRICS & GYNECOLOGY

## 2017-02-28 PROCEDURE — 77030021125

## 2017-02-28 RX ORDER — SIMETHICONE 80 MG
80 TABLET,CHEWABLE ORAL
Status: DISCONTINUED | OUTPATIENT
Start: 2017-02-28 | End: 2017-03-02 | Stop reason: HOSPADM

## 2017-02-28 RX ORDER — SWAB
1 SWAB, NON-MEDICATED MISCELLANEOUS DAILY
Status: DISCONTINUED | OUTPATIENT
Start: 2017-02-28 | End: 2017-03-02 | Stop reason: HOSPADM

## 2017-02-28 RX ORDER — IBUPROFEN 800 MG/1
800 TABLET ORAL EVERY 8 HOURS
Status: DISCONTINUED | OUTPATIENT
Start: 2017-02-28 | End: 2017-03-02 | Stop reason: HOSPADM

## 2017-02-28 RX ORDER — LIDOCAINE HYDROCHLORIDE 10 MG/ML
INJECTION INFILTRATION; PERINEURAL
Status: COMPLETED
Start: 2017-02-28 | End: 2017-02-28

## 2017-02-28 RX ORDER — OXYTOCIN/RINGER'S LACTATE 20/1000 ML
125-500 PLASTIC BAG, INJECTION (ML) INTRAVENOUS ONCE
Status: ACTIVE | OUTPATIENT
Start: 2017-02-28 | End: 2017-02-28

## 2017-02-28 RX ORDER — OXYCODONE AND ACETAMINOPHEN 5; 325 MG/1; MG/1
1 TABLET ORAL
Status: DISCONTINUED | OUTPATIENT
Start: 2017-02-28 | End: 2017-03-02

## 2017-02-28 RX ORDER — NALOXONE HYDROCHLORIDE 0.4 MG/ML
0.4 INJECTION, SOLUTION INTRAMUSCULAR; INTRAVENOUS; SUBCUTANEOUS AS NEEDED
Status: DISCONTINUED | OUTPATIENT
Start: 2017-02-28 | End: 2017-03-02 | Stop reason: HOSPADM

## 2017-02-28 RX ORDER — LIDOCAINE HYDROCHLORIDE 10 MG/ML
20 INJECTION, SOLUTION EPIDURAL; INFILTRATION; INTRACAUDAL; PERINEURAL ONCE
Status: ACTIVE | OUTPATIENT
Start: 2017-02-28 | End: 2017-02-28

## 2017-02-28 RX ORDER — ADHESIVE BANDAGE
30 BANDAGE TOPICAL DAILY PRN
Status: DISCONTINUED | OUTPATIENT
Start: 2017-02-28 | End: 2017-03-02 | Stop reason: HOSPADM

## 2017-02-28 RX ORDER — DIPHENHYDRAMINE HYDROCHLORIDE 50 MG/ML
12.5 INJECTION, SOLUTION INTRAMUSCULAR; INTRAVENOUS
Status: DISCONTINUED | OUTPATIENT
Start: 2017-02-28 | End: 2017-03-02 | Stop reason: HOSPADM

## 2017-02-28 RX ORDER — HYDROCORTISONE ACETATE PRAMOXINE HCL 2.5; 1 G/100G; G/100G
CREAM TOPICAL AS NEEDED
Status: DISCONTINUED | OUTPATIENT
Start: 2017-02-28 | End: 2017-03-02 | Stop reason: HOSPADM

## 2017-02-28 RX ORDER — OXYTOCIN IN 5 % DEXTROSE 30/500 ML
500 PLASTIC BAG, INJECTION (ML) INTRAVENOUS ONCE
Status: COMPLETED | OUTPATIENT
Start: 2017-02-28 | End: 2017-02-28

## 2017-02-28 RX ORDER — ONDANSETRON 2 MG/ML
4 INJECTION INTRAMUSCULAR; INTRAVENOUS
Status: DISCONTINUED | OUTPATIENT
Start: 2017-02-28 | End: 2017-03-02 | Stop reason: HOSPADM

## 2017-02-28 RX ADMIN — IBUPROFEN 800 MG: 800 TABLET, FILM COATED ORAL at 19:32

## 2017-02-28 RX ADMIN — LIDOCAINE HYDROCHLORIDE 20 ML: 10 INJECTION, SOLUTION INFILTRATION; PERINEURAL at 00:28

## 2017-02-28 RX ADMIN — IBUPROFEN 800 MG: 800 TABLET, FILM COATED ORAL at 03:07

## 2017-02-28 RX ADMIN — IBUPROFEN 800 MG: 800 TABLET, FILM COATED ORAL at 11:56

## 2017-02-28 RX ADMIN — Medication 500 ML/HR: at 00:28

## 2017-02-28 RX ADMIN — Medication 1 TABLET: at 11:56

## 2017-02-28 NOTE — DISCHARGE SUMMARY
Obstetrical Discharge Summary     Name: Maribel Rollins MRN: [id number]  SSN: [id number]    YOB: 1985  Age: 27 y.o. Sex: female      Admit Date: 2017    Discharge Date: 3/2/2017     Admitting Physician: Danyell Butler MD     Attending Physician:  Lam Rizvi MD     Admission Diagnoses: Pelvic pain affecting pregnancy in third trimester, antepartum    Discharge Diagnoses:   Information for the patient's :  Jessica Hugo, Male [[id number]]   Delivery of a 3.58 kg male infant via Vaginal, Spontaneous Delivery on 2017 at 12:20 AM  by . Apgars were 9 and 9. Additional Diagnoses:   Hospital Problems  Date Reviewed: 2017          Codes Class Noted POA    Pelvic pain affecting pregnancy in third trimester, antepartum ICD-10-CM: O26.893, R10.2  ICD-9-CM: 646.83, 625.9  2017 Unknown             Lab Results   Component Value Date/Time    Rubella, External immune 2014    GrBStrep, External Positive 2014       Immunization(s):   Immunization History   Administered Date(s) Administered    Influenza Vaccine (Quad) PF 2014, 2016    Tdap 2014, 2016        Hospital Course: After presenting to L&D for labor the pt had  at 39weeks 5 days without complication. There was a first degree perineal laceration that was successfully repaired. Normal hospital course following the delivery. Discharged on PPD2    Condition at Discharge:  Stable    Disposition:  Home with 6 weeks follow up at [facility] for postpartum visit    Patient Instructions:   Current Discharge Medication List      START taking these medications    Details   ibuprofen (MOTRIN) 800 mg tablet Take 1 Tab by mouth every eight (8) hours. Qty: 30 Tab, Refills: 0      docusate sodium (COLACE) 100 mg capsule Take 1 Cap by mouth two (2) times daily as needed for Constipation for up to 90 days.   Qty: 30 Cap, Refills: 0         CONTINUE these medications which have NOT CHANGED Details   sodium chloride (SALINE NASAL) 0.65 % nasal spray 1 spray by Both Nostrils route as needed for Congestion. Qty: 30 mL, Refills: 0    Associated Diagnoses: Nasal congestion      PRENATAL VIT/IRON FUMARATE/FA (PRENATAL PO) Take  by mouth. Associated Diagnoses: Initial obstetric visit         STOP taking these medications       acyclovir (ZOVIRAX) 400 mg tablet Comments:   Reason for Stopping:             Reference my discharge instructions. No orders of the defined types were placed in this encounter.        Signed By:  Latrell Siegel MD    Family Medicine Resident

## 2017-02-28 NOTE — PROGRESS NOTES
10:04 PM  Pt arrived to L&D c/o ctx for labor check. Resident paged. Notified of pt's arrival and will assess. 10:30PM  Dr Asad Vaughan notified of pt's arrival and advanced dilation with active labor. Orders recvd. Resident paged. 10:44 PM  Resident paged for second time to notify of pt's active labor status.

## 2017-02-28 NOTE — PROGRESS NOTES
TRANSFER - IN REPORT:    Verbal report received from [address] [place] [address],[address] (name) on [address] [place] [address]  being received from Labor and Delivery (unit) for routine progression of care      Report consisted of patients Situation, Background, Assessment and   Recommendations(SBAR). Information from the following report(s) SBAR, Kardex, Intake/Output, MAR and Recent Results was reviewed with the receiving nurse. Opportunity for questions and clarification was provided. Assessment completed upon patients arrival to unit and care assumed.

## 2017-02-28 NOTE — PROGRESS NOTES
History & Physical    Name: Leonora Gardner MRN: [id number]  SSN: [id number]    YOB: 1985  Age: 27 y.o. Sex: female      Subjective:     Reason for Admission:  Pregnancy, contractions and dark vaginal fluid    History of Present Illness: Ms. Mickey Hartley is a 27 y.o. G7P1312  female with an estimated gestational age of 43w3d with Estimated Date of Delivery: 3/2/17. Patient complains of severe contractions for 1 days and dark vaginal fluid since 12 pm of today (17). Pregnancy has been complicated by GBS (+). Patient denies abdominal pain  , chest pain, fever, headache , nausea and vomiting, right upper quadrant pain  , shortness of breath, swelling and visual disturbances. Patient with a history of genital herpes, asymptomatic during pregnancy, on Acyclovir 400 mg TID for 30 days, currently on day 21. She also have history of  death at first day of life during first pregnancy. [place] and was told that is was most likely due to genetic causes, never had specific diagnosis. On [address] on 17, the baby was found Vertex, with normal EFW at 59th%, and normal WON. OB History    Para Term  AB SAB TAB Ectopic Multiple Living   4 3 3  0     2      # Outcome Date GA Lbr [place]/2nd Weight Sex Delivery Anes PTL Lv   4 Current            3 Term 01/26/15 40w3d 6888:14 8 lb 13.5 oz (4.01 kg) M VAGINAL DELI None N Y   2 Term  40w0d             Birth Comments: VD   1 Term  37w0d             Birth Comments: VD - First child -  after 1 day. Baby didn't have kidneys, heart problem. 6 toes, 6 fingers, 40 weeks gestation. Was told genetic, but no testing. Same father of baby. Never tested either. Obstetric Comments   First child -  after 1 day. Baby didn't have kidneys, heart problem. 6 toes, 6 fingers, 40 weeks gestation. Was told genetic, but no testing. Same father of baby. Never tested either.      Past Medical History:   Diagnosis Date    Anemia     History of herpes genitalis      death     First child -  after 1 day. Baby didn't have kidneys, heart problem. 6 toes, 6 fingers, 40 weeks gestation. Was told genetic, but no testing. Same father of baby. Never tested either. FOB two nephews with autism. No past surgical history on file. Social History     Occupational History    Not on file. Social History Main Topics    Smoking status: Never Smoker    Smokeless tobacco: Never Used    Alcohol use No    Drug use: No    Sexual activity: Yes      Family History   Problem Relation Age of Onset    Thyroid Disease Mother     Asthma Mother     Other Mother      enlarged heart    Diabetes Other      Aunt and uncle - maternal        No Known Allergies  Prior to Admission medications    Medication Sig Start Date End Date Taking? Authorizing Provider   acyclovir (ZOVIRAX) 400 mg tablet Take 1 Tab by mouth three (3) times daily for 30 days. 2/6/17 3/8/17  Lou Oswald MD   sodium chloride (SALINE NASAL) 0.65 % nasal spray 1 spray by Both Nostrils route as needed for Congestion. 1/16/15   Heather Blanco MD   PRENATAL VIT/IRON FUMARATE/FA (PRENATAL PO) Take  by mouth.     Historical Provider        Review of Systems:  Constitutional: negative  Eyes: negative  Ears, Nose, Mouth, Throat, and Face: negative  Respiratory: negative  Cardiovascular: negative  Gastrointestinal: negative  Genitourinary:negative  Integument/Breast: negative  Hematologic/Lymphatic: negative  Musculoskeletal:negative  Neurological: negative  Behavioral/Psychiatric: negative  Endocrine: negative  Allergic/Immunologic: negative     Objective:     Vitals:    Vitals:    17 2215 17 2252   BP: 121/78    Pulse: 78    Resp: 16    Temp: 98.3 °F (36.8 °C)    Weight:  200 lb (90.7 kg)   Height:  5' 4\" (1.626 m)      Temp (24hrs), Av.3 °F (36.8 °C), Min:98.3 °F (36.8 °C), Max:98.3 °F (36.8 °C)    BP Min: 121/78  Max: 121/78     Physical Exam:  Patient without distress. Heart: Regular rate and rhythm, S1S2 present or without murmur or extra heart sounds  Lung: clear to auscultation throughout lung fields, no wheezes, no rales, no rhonchi and normal respiratory effort  Back: costovertebral angle tenderness absent  Abdomen: soft, nontender, protuberant, nondistended, normal bowel sounds, without guarding, without rebound  Fundus: soft, firm and mildly tender  Perineum: blood absent, amniotic fluid absent  Cervical Exam: 7 cm dilated    80% effaced    -3 station    Lower Extremities:  - Edema No     Membranes:  Intact  Uterine Activity:  Frequency: Every 3 minutes   Duration: 40 to 50 seconds  Intensity: strong  Fetal Heart Rate:  Reactive  Baseline: 150 per minute  Variability: moderate  Some accelerations, few decelerations      Lab/Data Review:  Recent Results (from the past 24 hour(s))   CBC W/O DIFF    Collection Time: 17 10:40 PM   Result Value Ref Range    WBC 11.7 (H) 3.6 - 11.0 K/uL    RBC 4.14 3.80 - 5.20 M/uL    HGB 12.6 11.5 - 16.0 g/dL    HCT 37.2 35.0 - 47.0 %    MCV 89.9 80.0 - 99.0 FL    MCH 30.4 26.0 - 34.0 PG    MCHC 33.9 30.0 - 36.5 g/dL    RDW 13.2 11.5 - 14.5 %    PLATELET 590 707 - 754 K/uL       Assessment and Plan:     Ms. Luigi Cowart is a 27 y.o.   female with an estimated gestational age of 43w3d who is admitted with active labor, expecting . 1. Start Penicillin G 5,000 units as loading dose, then 2,500 units q4h for GBS prophylaxis. 2. IVF with RL  3. Routine cervical checks until ROM, then minimize cervical checks  4.  Patient does not want Epidural anesthesia    Patient discussed with Dr. Stanton Smith MD  Family Medicine Resident

## 2017-02-28 NOTE — H&P
History & Physical    Name: Jalyn Soria MRN: [id number]  SSN: [id number]    YOB: 1985  Age: 27 y.o. Sex: female      Subjective:     Reason for Admission:  Pregnancy, contractions and dark vaginal fluid    History of Present Illness: Ms. Mariya Trejo is a 27 y.o. [id number]  female with an estimated gestational age of 43w3d with Estimated Date of Delivery: 3/2/17. Patient complains of severe contractions for 1 days and dark vaginal fluid since 12 pm of today (17). Pregnancy has been complicated by GBS (+). Patient denies abdominal pain  , chest pain, fever, headache , nausea and vomiting, right upper quadrant pain  , shortness of breath, swelling and visual disturbances. Patient with a history of genital herpes, asymptomatic during pregnancy, on Acyclovir 400 mg TID for 30 days, currently on day 21. She also have history of  death at first day of life during first pregnancy. [place] and was told that is was most likely due to genetic causes, never had specific diagnosis. On [facility] on 17, the baby was found Vertex, with normal EFW at 59th%, and normal WON. OB History    Para Term  AB SAB TAB Ectopic Multiple Living   4 3 3  0     2      # Outcome Date GA Lbr [place]/2nd Weight Sex Delivery Anes PTL Lv   4 Current            3 Term 01/26/15 40w3d 6888:14 8 lb 13.5 oz (4.01 kg) M VAGINAL DELI None N Y   2 Term  40w0d             Birth Comments: VD   1 Term  37w0d             Birth Comments: VD - First child -  after 1 day. Baby didn't have kidneys, heart problem. 6 toes, 6 fingers, 40 weeks gestation. Was told genetic, but no testing. Same father of baby. Never tested either. Obstetric Comments   First child -  after 1 day. Baby didn't have kidneys, heart problem. 6 toes, 6 fingers, 40 weeks gestation. Was told genetic, but no testing. Same father of baby. Never tested either.      Past Medical History:   Diagnosis Date    Anemia     History of herpes genitalis      death     First child -  after 1 day. Baby didn't have kidneys, heart problem. 6 toes, 6 fingers, 40 weeks gestation. Was told genetic, but no testing. Same father of baby. Never tested either. FOB two nephews with autism. No past surgical history on file. Social History     Occupational History    Not on file. Social History Main Topics    Smoking status: Never Smoker    Smokeless tobacco: Never Used    Alcohol use No    Drug use: No    Sexual activity: Yes      Family History   Problem Relation Age of Onset    Thyroid Disease Mother     Asthma Mother     Other Mother      enlarged heart    Diabetes Other      Aunt and uncle - maternal        No Known Allergies  Prior to Admission medications    Medication Sig Start Date End Date Taking? Authorizing Provider   acyclovir (ZOVIRAX) 400 mg tablet Take 1 Tab by mouth three (3) times daily for 30 days. 2/6/17 3/8/17  Katja Thorne MD   sodium chloride (SALINE NASAL) 0.65 % nasal spray 1 spray by Both Nostrils route as needed for Congestion. 1/16/15   Emerson Clifton MD   PRENATAL VIT/IRON FUMARATE/FA (PRENATAL PO) Take  by mouth.     Historical Provider        Review of Systems:  Constitutional: negative  Eyes: negative  Ears, Nose, Mouth, Throat, and Face: negative  Respiratory: negative  Cardiovascular: negative  Gastrointestinal: negative  Genitourinary:negative  Integument/Breast: negative  Hematologic/Lymphatic: negative  Musculoskeletal:negative  Neurological: negative  Behavioral/Psychiatric: negative  Endocrine: negative  Allergic/Immunologic: negative     Objective:     Vitals:    Vitals:    17 2215 17 2252   BP: 121/78    Pulse: 78    Resp: 16    Temp: 98.3 °F (36.8 °C)    Weight:  200 lb (90.7 kg)   Height:  5' 4\" (1.626 m)      Temp (24hrs), Av.3 °F (36.8 °C), Min:98.3 °F (36.8 °C), Max:98.3 °F (36.8 °C)    BP Min: 121/78  Max: 121/78     Physical Exam:  Patient without distress. Heart: Regular rate and rhythm, S1S2 present or without murmur or extra heart sounds  Lung: clear to auscultation throughout lung fields, no wheezes, no rales, no rhonchi and normal respiratory effort  Back: costovertebral angle tenderness absent  Abdomen: soft, nontender, protuberant, nondistended, normal bowel sounds, without guarding, without rebound  Fundus: soft, firm and mildly tender  Perineum: blood absent, amniotic fluid absent  Cervical Exam: 7 cm dilated    80% effaced    -3 station    Lower Extremities:  - Edema No     Membranes:  Intact  Uterine Activity:  Frequency: Every 3 minutes   Duration: 40 to 50 seconds  Intensity: strong  Fetal Heart Rate:  Reactive  Baseline: 150 per minute  Variability: moderate  Some accelerations, few decelerations      Lab/Data Review:  Recent Results (from the past 24 hour(s))   CBC W/O DIFF    Collection Time: 17 10:40 PM   Result Value Ref Range    WBC 11.7 (H) 3.6 - 11.0 K/uL    RBC 4.14 3.80 - 5.20 M/uL    HGB 12.6 11.5 - 16.0 g/dL    HCT 37.2 35.0 - 47.0 %    MCV 89.9 80.0 - 99.0 FL    MCH 30.4 26.0 - 34.0 PG    MCHC 33.9 30.0 - 36.5 g/dL    RDW 13.2 11.5 - 14.5 %    PLATELET 961 513 - 427 K/uL       Assessment and Plan:     Ms. Bess Servin is a 27 y.o.   female with an estimated gestational age of 43w3d who is admitted with active labor, expecting . 1. Start Penicillin G 5,000 units as loading dose, then 2,500 units q4h for GBS prophylaxis. 2. IVF with RL  3. Routine cervical checks until ROM, then minimize cervical checks  4.  Patient does not want Epidural anesthesia    Patient discussed with Dr. Rufus Mcmillan MD  Family Medicine Resident

## 2017-02-28 NOTE — PROGRESS NOTES
Bedside and Verbal shift change report given to [address] (oncoming nurse) by Jailene Davis RN (offgoing nurse). Report included the following information SBAR, Intake/Output and MAR.

## 2017-02-28 NOTE — LACTATION NOTE
Baby getting hearing screen. Given mom breat feeding packet and will return after test to discuss. Mom also plans to do both breast and formula.

## 2017-02-28 NOTE — PROGRESS NOTES
Problem: Lactation Care Plan  Goal: *Infant latching appropriately  Outcome: Progressing Towards Goal  Mom plans to do both breast and formula. States she doesn't have any milk yet. Discussed the importance of colostrom and how it has many antibacterial properities. Placed baby skin to skin with mom, in  [address] [facility] left breast,  Drops of colustrum expressed, baby licking at drops, and latched,  Intermittent rhythmic nursing noted with occasional swallow. Pt will successfully establish breastfeeding by feeding in response to early feeding cues   or wake every 3h, will obtain deep latch, and will keep log of feedings/output. Taught to BF at hunger cues and or q 2-3 hrs and to offer 10-20 drops of hand expressed colostrum at any non-feeds. Breast Assessment  Left Breast: Medium  Left Nipple: Everted, Intact  Right Breast: Medium  Right Nipple: Everted, Intact  Breast- Feeding Assessment  Attends Breast-Feeding Classes: No  Breast-Feeding Experience: Yes (6 months with first 2)  Breast Trauma/Surgery: No  Type/Quality: Good  Lactation Consultant Visits  Breast-Feedings: Good   Mother/Infant Observation  Mother Observation: Alignment, Breast comfortable, Close hold, Cramps, Holds breast, Lets baby end feeding, Nipple round on release  Infant Observation: Audible swallows, Breast tissue moves, Latches nipple and aereolae, Lips flanged, lower, Lips flanged, upper, Opens mouth  LATCH Documentation  Latch: Grasps breast, tongue down, lips flanged, rhythmic sucking  Audible Swallowing: Spontaneous and intermittent (24 hours old)  Type of Nipple: Everted (after stimulation)  Comfort (Breast/Nipple): Soft/non-tender  Hold (Positioning): Full assist, teach one side, mother does other, staff holds  LATCH Score: 9               Goal: *Weight loss less than 10% of birth weight  Outcome: Progressing Towards Goal  Encouraged mom to attempt feeding every 2-3 hours in the first couple of days.  Looking for 8-12 feedings in 24 hours. Don't limit baby at breast, allow baby to come of breast on it's own. Baby may want to feed more often then every 2-3 hours. Baby may not feed that often, but feedings should be attempted. If baby doesn't nurse,  Mom can hand express drops of colostrum and drip into baby's mouth, or  give to baby by finger feeding, cup feeding,or spoon feeding. Encouraged skin to skin. Mom agrees to, but plans do both breast and formula    Problem: Patient Education: Go to Patient Education Activity  Goal: Patient/Family Education  Outcome: Progressing Towards Goal  Reviewed breastfeeding basics:  Supply and demand,  stomach size, early  Feeding cues, skin to skin, positioning and baby led latch-on, assymetrical latch with signs of good, deep latch vs shallow, feeding frequency and duration, and log sheet for tracking infant feedings and output. Breastfeeding Booklet and Warm line information given. Discussed typical  weight loss and the importance of infant weight checks with pediatrician 1-2 post discharge. Hand Expression Education:  Mom taught how to manually hand express her colostrum. Emphasized the importance of providing infant with valuable colostrum as infant rests skin to skin at breast.  Aware to avoid extended periods of non-feeding. Aware to offer 10-20+ drops of colostrum every 2-3 hours until infant is latching and nursing effectively. Taught the rationale behind this low tech but highly effective evidence based practice.      All information given in Ecuadorean

## 2017-02-28 NOTE — PROGRESS NOTES
02/28/17 12:44 PM  CM met with patient to complete initial assessment and to begin discharge planning. [employer] phone used, [phone]. Patient demographics confirmed. Patient has two other children, ages 10 and 2. 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 Patient's support system includes her mother, who is present with her today, and several other local friends and family. Patient is breast and bottle feeding. [address] [place] [address] [facility] will provide medical follow up for the baby. Patient has car seat, crib, clothing, and other necessary supplies. [facility] notified to complete the Medicaid process for the baby and pursue the Care Card for the baby. Patient has [address] and is aware of the process to add the baby. Care Management Interventions  PCP Verified by CM: Yes  Transition of Care Consult (CM Consult): Discharge Planning  Current Support Network:  Other (Lives with partner/FOB)  Confirm Follow Up Transport: Family  Plan discussed with Pt/Family/Caregiver: Yes  Discharge Location  Discharge Placement: [address] [place] [address]

## 2017-02-28 NOTE — PROGRESS NOTES
Language Line: # [phone]: Patient introduced and oriented to unit, initial assessments completed, infant safety education provided. Patient rates her pain 7/10 and requests motrin at this time. Plans to formula feed infant with similac and will breastfeed as well. Breastfeeding assistance offered by RN. RN will continue to monitor. 0700: Bedside shift change report given to Nate Arias RN (oncoming nurse) by Betty Blair RN (offgoing nurse). Report included the following information SBAR, Kardex, Intake/Output, MAR and Recent Results.

## 2017-02-28 NOTE — PROGRESS NOTES
18 dr Padilla Hint at bedside for assess and eval. sterile speculum exam complete to look for hsv lesions due to pt hx. No lesions noted.

## 2017-02-28 NOTE — L&D DELIVERY NOTE
Delivery Summary    Patient: Dennis Lyles MRN: [id number]  SSN: [id number]    YOB: 1985  Age: 27 y.o. Sex: female        Labor Events:    Labor: No    Rupture Date: 2017    Rupture Time: 12:04 AM    Rupture Type AROM    Amniotic Fluid Volume: Moderate     Amniotic Fluid Description: Meconium       Induction: None         Augmentation: None    Labor Complications: None     Additional Complications:        Cervical Ripening:              Delivery Events:  Episiotomy: None    Laceration(s): First degree perineal       Repaired: Yes     Number of Repair Packets: 1    Suture Type and Size: Vicryl 3-0        Estimated Blood Loss (ml): 300        Information for the patient's :  Nava Sage [[id number]]     Delivery Summary - Baby    Delivery Date: 2017   Delivery Time: 12:20 AM   Delivery Type: Vaginal, Spontaneous Delivery  Sex:  male  Gestational Age: 39w5d  Delivery Clinician:  Joanne Oswald  Living?:     Delivery Location:               [facility]  One minute Five minutes Ten minutes   Skin Color:            Heart Rate:             Reflex Irritability:             Muscle Tone:           Respiration:             Total:               Presentation: Vertex  Position:   Occiput Posterior  Resuscitation Method:  Suctioning-bulb; Tactile Stimulation     Meconium Stained: Thin    Cord Information: 3 Vessels   Complications: None;Nuchal Cord Without Compressions  Cord Blood Sent?:  Yes    Blood Gases Sent?:  No    Placenta:  Date/Time:  12:25 AM  Removal: Spontaneous      Appearance: Normal      Measurements:  Birth Weight:      Birth Length:     Head Circumference:       Chest Circumference:      Abdominal Girth: Other Providers:   Gloria ERAZO;DAVID LEDESMA;JESSICA GONZALEZ;NEY CHEN;JOSE CHAMORRO DANEAL Obstetrician;Primary Nurse;Primary  Nurse;Resident; Charge Nurse;Tech           Cord Blood Results:  Information for the patient's :  Leodan Orellana Male [[id number]]   No results found for: Esmer [place], PCTDIG, BILI, ABORHEXT, [address]    Information for the patient's :  Leodan Orellana Male [[id number]]   No results found for: APH, APCO2, APO2, Lauren, ABEC, ABDC, O2ST, [place], [place], PHI, [place], PO2I, HCO3I, SO2I, IBD     Information for the patient's :  Leodan Orellana Male [[id number]]   No results found for: Concetta [place], PO2V, Jarvis Adler, EBDV    Delivery Summary    Patient: Segundo Villareal MRN: [id number]  SSN: [id number]    YOB: 1985  Age: 27 y.o. Sex: female        Labor Events:    Labor: No    Rupture Date: 2017    Rupture Time: 12:04 AM    Rupture Type AROM    Amniotic Fluid Volume: Moderate     Amniotic Fluid Description: Meconium       Induction: None         Augmentation: None    Labor Complications: None     Additional Complications:        Cervical Ripening:              Delivery Events:  Episiotomy: None    Laceration(s): First degree perineal       Repaired: Yes     Number of Repair Packets: 1    Suture Type and Size: Vicryl 3-0        Estimated Blood Loss (ml): 300        Information for the patient's :  Nava Irwin [[id number]]     Delivery Summary - Baby    Delivery Date: 2017   Delivery Time: 12:20 AM   Delivery Type: Vaginal, Spontaneous Delivery  Sex:  male  Gestational Age: 39w5d  Delivery Clinician:  Gage Parker?:     Delivery Location:               [facility]  One minute Five minutes Ten minutes   Skin Color:            Heart Rate:             Reflex Irritability:             Muscle Tone:           Respiration:             Total:               Presentation: Vertex  Position:   Occiput Posterior  Resuscitation Method:  Suctioning-bulb; Tactile Stimulation     Meconium Stained:  Thin    Cord Information: 3 Vessels   Complications: None;Nuchal Cord Without Compressions  Cord Blood Sent?:  Yes    Blood Gases Sent?: No    Placenta:  Date/Time:  12:25 AM  Removal: Spontaneous      Appearance: Normal      Measurements:  Birth Weight:      Birth Length:     Head Circumference:       Chest Circumference:      Abdominal Girth: Other Providers:   Debora ERAZO;DAVID LEDESMA;JESSICA GONZALEZ;NEY CHEN;ARIEL CHAMORRO;MICHELLE QUINTEROS Obstetrician;Primary Nurse;Primary [place] Nurse;Resident; Charge Nurse;Tech           Cord Blood Results:  Information for the patient's :  Jessica Hugo, Male [[id number]]   No results found for: Maurene Aguila, PCTDIG, BILI, ABORHEXT, [address]    Information for the patient's :  Jessica Hugo, Male [[id number]]   No results found for: APH, APCO2, APO2, AHCO3, ABEC, ABDC, O2ST, [place], [place], PHI, [place], PO2I, HCO3I, SO2I, IBD     Information for the patient's :  Jessica Hugo, Male [[id number]]   No results found for: EPHV, PCO2V, PO2V, HCO3V, O2STV, EBDV    Additional Comments:  When the patient reached complete dilation an amniotomy was performed with moderate meconium noted. The patient had an uncomplicated  of a term live male infant in direct OP presentation. There was a tight cord wrapped around the anterior shoulder that had to be clamped and cut. The placenta delivered spontaneously and intact. There was a first degree perineal laceration that was infiltrated with 1% lidocaine and repaired with 3-0 vicryl.

## 2017-03-01 PROCEDURE — 65270000029 HC RM PRIVATE

## 2017-03-01 PROCEDURE — 74011250637 HC RX REV CODE- 250/637: Performed by: FAMILY MEDICINE

## 2017-03-01 RX ADMIN — IBUPROFEN 800 MG: 800 TABLET, FILM COATED ORAL at 19:50

## 2017-03-01 RX ADMIN — IBUPROFEN 800 MG: 800 TABLET, FILM COATED ORAL at 11:40

## 2017-03-01 RX ADMIN — Medication 1 TABLET: at 08:33

## 2017-03-01 RX ADMIN — IBUPROFEN 800 MG: 800 TABLET, FILM COATED ORAL at 03:07

## 2017-03-01 NOTE — PROGRESS NOTES
Bedside shift change report given to YOVANY Frazier RN (oncoming nurse) by JAKE Mulligan RN (offgoing nurse). Report included the following information SBAR, Intake/Output, MAR and Recent Results.

## 2017-03-01 NOTE — PROGRESS NOTES
Problem: Lactation Care Plan  Goal: *Infant latching appropriately  Outcome: Progressing Towards Goal  Mom states baby nursed well around  9 am but gave a bottle of formula at 6. States plans to do both. Problem: Patient Education: Go to Patient Education Activity  Goal: Patient/Family Education  Outcome: Progressing Towards Goal  Pt chooses to do both breast and bottle. Discussed effects of early supplementation on breastfeeding success; may decrease breastmilk production and supply, increase risk for pathological engorgement, baby may develop preference for faster flow from bottles vs breast, and baby's stomach can be stretched if larger volumes of formula are given.

## 2017-03-01 NOTE — PROGRESS NOTES
Bedside and Verbal shift change report given to Manjit Love RN (oncoming nurse) by Tatiana Nam RN (offgoing nurse). Report included the following information SBAR, Intake/Output, MAR and Recent Results.

## 2017-03-01 NOTE — PROGRESS NOTES
Post-Partum Day Number 1 Progress Note    Patient doing well post-partum without significant complaint. Pain well controlled, 4/10. Lochia normal.  Tolerating diet. Ambulating. Voiding without difficulty. No flatus or BM. Vitals:  No data found. Temp (24hrs), Av.2 °F (36.8 °C), Min:97.7 °F (36.5 °C), Max:98.6 °F (37 °C)      Exam:  Patient without distress. CTAB, no wheezing               RRR, no murmur. Abdomen soft, fundus firm at level of umbilicus, ATTP. Lower extremities:  No edema. No palpable cords or tenderness. Lab/Data Review:  No results found for this or any previous visit (from the past 12 hour(s)). Assessment and Plan:    Kennedy Celeste is a 27 y.o.  PPD1 s/p  at 39w4d. Patient appears to be having uncomplicated post-partum course. 1. Continue routine perineal care and maternal education. Breast and formula feeding    2. Plan discharge tomorrow if no problems occur.     Patient to be discussed with Dr. Gage Carpenter MD  Family Medicine Resident

## 2017-03-01 NOTE — PROGRESS NOTES
Bedside and Verbal shift change report given to Colin Moore RN (oncoming nurse) by Jaspal Sánchez RNC (offgoing nurse). Report included the following information SBAR, Kardex, Intake/Output, MAR and Recent Results.

## 2017-03-02 VITALS
TEMPERATURE: 98.3 F | RESPIRATION RATE: 17 BRPM | BODY MASS INDEX: 34.15 KG/M2 | HEIGHT: 64 IN | DIASTOLIC BLOOD PRESSURE: 55 MMHG | SYSTOLIC BLOOD PRESSURE: 96 MMHG | HEART RATE: 65 BPM | WEIGHT: 200 LBS

## 2017-03-02 PROCEDURE — 74011250637 HC RX REV CODE- 250/637: Performed by: FAMILY MEDICINE

## 2017-03-02 RX ORDER — DOCUSATE SODIUM 100 MG/1
100 CAPSULE, LIQUID FILLED ORAL
Qty: 30 CAP | Refills: 0 | Status: SHIPPED | OUTPATIENT
Start: 2017-03-02 | End: 2017-05-31

## 2017-03-02 RX ORDER — ACETAMINOPHEN 325 MG/1
650 TABLET ORAL
Status: DISCONTINUED | OUTPATIENT
Start: 2017-03-02 | End: 2017-03-02 | Stop reason: HOSPADM

## 2017-03-02 RX ORDER — DOCUSATE SODIUM 100 MG/1
100 CAPSULE, LIQUID FILLED ORAL 2 TIMES DAILY
Status: DISCONTINUED | OUTPATIENT
Start: 2017-03-02 | End: 2017-03-02 | Stop reason: HOSPADM

## 2017-03-02 RX ORDER — IBUPROFEN 800 MG/1
800 TABLET ORAL EVERY 8 HOURS
Qty: 30 TAB | Refills: 0 | Status: SHIPPED | OUTPATIENT
Start: 2017-03-02 | End: 2022-05-05 | Stop reason: SDUPTHER

## 2017-03-02 RX ADMIN — IBUPROFEN 800 MG: 800 TABLET, FILM COATED ORAL at 12:28

## 2017-03-02 RX ADMIN — DOCUSATE SODIUM 100 MG: 100 CAPSULE ORAL at 09:25

## 2017-03-02 RX ADMIN — IBUPROFEN 800 MG: 800 TABLET, FILM COATED ORAL at 04:39

## 2017-03-02 RX ADMIN — Medication 1 TABLET: at 09:25

## 2017-03-02 RX ADMIN — ACETAMINOPHEN 650 MG: 325 TABLET ORAL at 00:27

## 2017-03-02 NOTE — LACTATION NOTE
Mother resting in bed, blue phone used to go over discharge teaching. Mother asking questions regarding nasal congestion and cold medications and its effects on breastfeeding. Encouraged mother to avoid taking any medications while breastfeeding unless it was really necessary. Encouraged mother to increase times she breastfeeds to increase her supply epically if she is taking a cold medication that dries up secretions. Chart shows numerous feedings, void, stool WNL. Discussed importance of monitoring outputs and feedings on first week of life. Discussed ways to tell if baby is  getting enough breast milk, ie  voids and stools, change in color of stool, and return to birth wt within 2 weeks. Follow up with pediatrician visit for weight check in 1-2 days (per AAP guidelines.)  Encouraged to call Warm Line  [phone] or [facility] at [phone] for any questions/problems that arise. Mother also given breastfeeding support group dates and times for any future needs    Pt will successfully establish breastfeeding by feeding in response to early feeding cues   or wake every 3h, will obtain deep latch, and will keep log of feedings/output. Taught to BF at hunger cues and or q 2-3 hrs and to offer 10-20 drops of hand expressed colostrum at any non-feeds.       Breast Assessment  Left Breast: Medium  Left Nipple: Everted, Intact  Right Breast: Medium  Right Nipple: Everted, Intact  Breast- Feeding Assessment  Attends Breast-Feeding Classes: No  Breast-Feeding Experience: Yes  Breast Trauma/Surgery: No  Type/Quality: Good  Lactation Consultant Visits  Breast-Feedings: Good   Mother/Infant Observation  Mother Observation: Breast comfortable, Recognizes feeding cues  Infant Observation: Rhythmic suck, Feeding cues

## 2017-03-02 NOTE — PROGRESS NOTES
Post-Partum Day Number 2 Progress/Discharge Note    Patient doing well post-partum without significant complaint. Pain well controlled. Headache 3/10 this AM, with some improvement. Lochia normal.  Tolerating diet. Ambulating. Voiding without difficulty. +flatus. No BM. Vitals:  Patient Vitals for the past 8 hrs:   BP Temp Pulse Resp   17 0638 96/55 98.3 °F (36.8 °C) 65 17     Temp (24hrs), Av.1 °F (36.7 °C), Min:97 °F (36.1 °C), Max:98.5 °F (36.9 °C)      Vital signs stable, afebrile. Exam:  Patient without distress. CTAB, no wheeze               RRR, + S1 and S2, no murmur               Abdomen soft, fundus firm at level of umbilicus, non tender               Lower extremities are negative for swelling, cords or tenderness. Lab/Data Review:  No results found for this or any previous visit (from the past 12 hour(s)). Assessment and Plan:      Joshua Rodriguez is a 27 y.o. [id number] PPD2 s/p  at 39w4d. precipitous delivery, GBS pos and inadequately treated; hx of genital herpes without active lesions on suppressive therapy. Patient appears to be having uncomplicated post-partum course. 1. Continue routine perineal care and maternal education. 2. Plan discharge for today with follow up in [facility] office in 6 weeks.     Patient to be discussed with Dr. Dallas Pelaez MD  Family Medicine Resident

## 2017-03-02 NOTE — PROGRESS NOTES
Bedside and Verbal shift change report given to Stephanie Cantu RN (oncoming nurse) by Edda Peabody, RN (offgoing nurse). Report included the following information SBAR, Intake/Output, MAR and Recent Results.

## 2017-03-02 NOTE — DISCHARGE INSTRUCTIONS
Patient Discharge Instructions    [facility] / [id number] : 1985    Admitted 2017 Discharged: 3/2/2017       Please bring this form with you to show your care provider at your follow-up appointment. Primary care provider:  Aroldo Valencia MD    Discharging provider:  Rico Bradford MD  - Family Medicine Resident  Itzel Whatley MD - Family Medicine Attending      ACUTE DIAGNOSES:  Pelvic pain affecting pregnancy in third trimester, antepartum      FOLLOW-UP CARE RECOMMENDATIONS:    Follow-up Information     Follow up With Details Comments Contact Info    Aroldo Valencia MD Schedule an appointment as soon as possible for a visit in 6 weeks  [facility]  [phone]            Continuing Therapy:  - Please continue Motrin 800 mg, 1 tablet every 8 hours for the next 2 days, then 1 tablet every 8 hours as needed for pain  - Please continue Colace 100 mg for constipation, take one tablet BID until having regular bowel movements  - Please continue your prenatal vitamins as long as you are breastfeeding     Follow-up tests needed: none    Pending test results: At the time of your discharge the following test results are still pending: none   Please make sure you review these results with your outpatient follow-up provider(s). Specific symptoms to watch for: chest pain, shortness of breath, fever, chills, nausea, vomiting, diarrhea, change in mentation, falling, weakness, bleeding. DIET/what to eat:  Regular Diet    ACTIVITY:   Activity Instructions    Do not lift anything heavier than your baby for 6 weeks. Nothing in the vagina for 6 weeks. You are ok to drive as long as you are not taking Percocet or other narcotic pain medication (Motrin is ok). Wound care: you may shower, but do not soak your wound. You may remove your bandaid (steristrips) in 2-3 days if they have not fallen off yet.      I understand that if any problems occur once I am at home I am to contact my physician. These instructions were explained to me and I had the opportunity to ask questions. I understand and acknowledge receipt of the instructions indicated above. Physician's or R.N.'s Signature                                                                  Date/Time                                                                                                                                              Patient or Representative Signature                                                          Date/Time    Después del parto (período de posparto): Después de la consulta  [After Your Delivery (the Postpartum Period): After Your Visit]  Instrucciones de cuidado  Felicidades por el nacimiento de rodriguez bebé. Al igual que el Juliana, el tiempo con el recién nacido puede ser un momento de [place], Marco Santy y agotamiento. Es posible que se sienta lovett al mirar la timo de rodriguez pequeño bebé. También podría sentirse abrumada por rodriguez nuevo ritmo de sueño y las nuevas responsabilidades. Al principio, los bebés suelen dormir mine el día y permanecen despiertos mine la noche. No tienen ningún patrón ni rutina. Podrían mundo gritos ahogados, sacudirse y despertarse, o parecer kathryn si tuvieran los ojos cruzados (bizcos). Todo esto es normal, e incluso la pueden hacer sonreír. Mine las primeras semanas siguientes al parto, trate de cuidarse rolf. Podría tardar de 4 a 6 semanas en volver a sentirse usted misma, y posiblemente más tiempo si le mon hecho sima cesárea. Es probable que se sienta muy fatigada mine varias semanas. Anne Marie días estarán llenos de [place], parag también de mucha alegría. La atención de seguimiento es sima parte clave de rodriguez tratamiento y seguridad.  Asegúrese de hacer y acudir a todas las citas, y llame a rodriguez médico si está teniendo problemas. También es sima buena idea saber los resultados de los exámenes y mantener sima lista de los medicamentos que navin. ¿Cómo puede cuidarse en el hogar? Cuide rodriguez cuerpo después del parto  · Utilice toallas sanitarias en vez de tampones para las pérdidas de stacey que podrían durar hasta 2 semanas. · Alivie los cólicos con ibuprofeno (Advil, Motrin). · Alivie el dolor de las hemorroides y la pieter entre la vagina y el recto con compresas de hielo o de infusión de hamamelis [place] (\"witch hazel\"). · Alivie el estreñimiento bebiendo abundante líquido y comiendo alimentos ricos en fibra. Pregúntele a rodriguez médico acerca de los ablandadores de heces de [employer]. · Límpiese con un chorrito suave de agua tibia de sima botella en vez de hacerlo con papel higiénico.  · [place] un baño de asiento en agua tibia varias veces al día. · Use un buen sostén de lactancia. Alivie el dolor y la hinchazón de los senos con toallitas de aseo húmedas tibias. · Si no está amamantando, utilice hielo en lugar de calor para aliviar el dolor de los senos. · Si está amamantando, rodriguez período menstrual podría no comenzar hasta después de varios meses. Es posible que Chi, y más tiempo al principio, de kathryn lo hacía antes del Juliana. · Espere hasta que haya sanado (de 4 a 6 semanas) antes de volver a tener relaciones sexuales. Rodriguez médico le dirá cuándo puede tener relaciones sexuales. · Trate de no viajar con el bebé mine las primeras 5 o 6 semanas. Si hace un viaje marcus en automóvil, alfredito paradas frecuentes para caminar y estirarse. Evite el agotamiento  · Descanse todos los días. Trate de dormir la siesta cuando rodriguez bebé también lo hace. · Pídale a otro adulto que la acompañe por unos favio después del [place]. · Planifique el cuidado de los niños si tiene otros hijos. · Sea flexible para que pueda comer a horas fuera de lo común y dormir cuando lo necesite.  Tanto usted kathryn rodriguez bebé están creando horarios nuevos. · Planee pequeñas salidas para estar afuera de la casa. El cambio podría hacer que se sienta menos fatigada. · Pida ayuda para cocinar y [address] [facility] [place] hogar y las compras. Recuerde que rodriguez principal tarea consiste en cuidar a rodriguez bebé. Conozca la ayuda que puede recibir en mckenna de tener depresión posparto  · La depresión posparto es común mine las primeras 1 a 2 semanas siguientes al nacimiento. Podría llorar o sentirse bernice o irritable sin razón alguna. · Descanse cada vez que pueda hacerlo. Estar fatigada le dificulta manejar dominique emociones. · Salga a caminar con rodriguez bebé. · Hable con rodriguez deonte, dominique amigos y dominique familiares acerca de dominique sentimientos. · Si dominique síntomas rivera más de unas pocas semanas, o si se siente muy deprimida, pídale ayuda a rodriguez médico.  · La depresión posparto puede tratarse. Los grupos de apoyo y la asesoría psicológica pueden ser de [facility]. A veces, los medicamentos también pueden ayudar. Manténgase saludable  · Coma alimentos sanos para tener más energía, producir sima buena [place] materna y adelgazar las libras adicionales que engordó con el bebé. · Si amamanta, evite el alcohol y las drogas. No fume. Si dejó de fumar mine el embarazo, felicitaciones. · Inicie ejercicios diarios después de 4 a 6 semanas, parag descanse cuando se sienta fatigada. · Aprenda ejercicios para tonificar el abdomen. Braulio los ejercicios de [place] para recuperar la fuerza de los músculos pélvicos. Puede hacer los ejercicios de [place] mientras está de pie o sentada. ¨ Apriete los mismos músculos que usted usaría para detener la [place]. El vientre y las nalgas (glúteos) no deben moverse. ¨ Manténgalos apretados mine 3 segundos, luego relájelos otros 3 segundos. ¨ Repita el ejercicio entre 10 y 13 veces cada sesión. Braulio zuleima o más sesiones cada día. · Busque sima clase para nuevas madres y recién nacidos que tenga un tiempo de ejercicio.   · Si le mon hecho Cesilia Speaks, dese un poco más de tiempo antes de hacer ejercicio, y tenga cuidado. ¿Cuándo debe pedir ayuda? Llame al 911 en cualquier momento que considere que necesita atención de emergencia. Por ejemplo, llame si:  · Se desmayó (perdió el conocimiento). Llame a rodriguez médico ahora mismo o busque atención médica inmediata si:  · Tiene sangrado vaginal intenso. Casa significa que está expulsando coágulos sanguíneos y empapando sima toalla sanitaria cada hora por 2 horas o más. · Está mareada o aturdida, o siente kathryn que se puede [address] [facility]. · Tiene fiebre. · Tiene dolor abdominal nuevo o que empeora. Preste especial atención a los cambios en rodriguez ladan y asegúrese de comunicarse con rodriguez médico si:  · Rodriguez sangrado vaginal parece volverse más intenso. · Tiene flujo vaginal nuevo o que empeora. · Se siente bernice, ansiosa o sin esperanzas mine más de unos pocos días. · No mejora kathryn se esperaba. ¿Dónde puede encontrar más información en inglés? Penne Downey a [email or web address]  [facility] I889 en la búsqueda para aprender más acerca de \"Después del parto (período de posparto): Después de la consulta. \"   © 9277-1712 Healthwise, Incorporated. Instrucciones de cuidado adaptadas bajo licencia por Alireza Birch (which disclaims liability or warranty for this information). Estas instrucciones de cuidado son para usarlas con rodriguez profesional clínico registrado. Si tiene preguntas acerca de sima afección médica o de estas instrucciones, pregunte siempre a rodriguez profesional de [employer]. Healthwise, Incorporated niega cualquier garantía o responsabilidad por rodriguez uso de esta información. Versión del contenido: [id number]; Última revisión: 21 marzo, 2012    MemBl[facility] Activation    Thank you for requesting access to Mem[facility]. Please follow the instructions below to securely access and download your online medical record.  MemBlaze allows you to send messages to your doctor, view your test results, renew your prescriptions, schedule appointments, and more. How Do I Sign Up? 1. In your internet browser, go to [email or web address] (formerly [email or web address]). Birdback  2. Click on the First Time User? Click Here link in the Sign In box. You will be redirect to the New Member Sign Up page. 3. Enter your [facility] Access Code exactly as it appears below. You will not need to use this code after youve completed the sign-up process. If you do not sign up before the expiration date, you must request a new code. [facility] Access Code: [email or web address]  Expires: 2017  9:48 AM (This is the date your [facility] access code will )    4. Enter the last four digits of your Social Security Number (xxxx) and Date of Birth (mm/dd/yyyy) as indicated and click Submit. You will be taken to the next sign-up page. 5. Create a [facility] ID. This will be your [facility] login ID and cannot be changed, so think of one that is secure and easy to remember. 6. Create a [facility] password. You can change your password at any time. 7. Enter your Password Reset Question and Answer. This can be used at a later time if you forget your password. 8. Enter your e-mail address. You will receive e-mail notification when new information is available in [address] 9. Click Sign Up. You can now view and download portions of your medical record. 10. Click the Download Summary menu link to download a portable copy of your medical information. Additional Information    If you have questions, please visit the Frequently Asked Questions section of the [facility] website at [email or web address] Remember, [facility] is NOT to be used for urgent needs. For medical emergencies, dial 911.

## 2017-03-29 ENCOUNTER — OFFICE VISIT (OUTPATIENT)
Dept: FAMILY MEDICINE CLINIC | Age: 32
End: 2017-03-29

## 2017-03-29 VITALS
HEART RATE: 68 BPM | SYSTOLIC BLOOD PRESSURE: 110 MMHG | HEIGHT: 64 IN | OXYGEN SATURATION: 100 % | WEIGHT: 182 LBS | TEMPERATURE: 98 F | BODY MASS INDEX: 31.07 KG/M2 | RESPIRATION RATE: 16 BRPM | DIASTOLIC BLOOD PRESSURE: 65 MMHG

## 2017-03-29 DIAGNOSIS — N93.9 VAGINAL BLEEDING: Primary | ICD-10-CM

## 2017-03-29 LAB
BILIRUB UR QL STRIP: NEGATIVE
GLUCOSE UR-MCNC: NEGATIVE MG/DL
HGB BLD-MCNC: 12.6 G/DL
KETONES P FAST UR STRIP-MCNC: NEGATIVE MG/DL
PH UR STRIP: 7 [PH] (ref 4.6–8)
PROT UR QL STRIP: NEGATIVE MG/DL
SP GR UR STRIP: 1.02 (ref 1–1.03)
UA UROBILINOGEN AMB POC: NORMAL (ref 0.2–1)
URINALYSIS CLARITY POC: NORMAL
URINALYSIS COLOR POC: YELLOW
URINE BLOOD POC: NORMAL
URINE LEUKOCYTES POC: NORMAL
URINE NITRITES POC: NEGATIVE

## 2017-03-29 NOTE — PROGRESS NOTES
Sydni Bennett is a 27 y.o. female presents with a chief complaint of vaginal bleeding that she states has been present ever since the delivery of her  on 17. She states that she changes about 4 pads per day. Denies foul-smelling discharge or itching. Denies fevers or chills. Denies nausea or vomiting. Denies headaches or lightheadedness. Mentions that her obgyn is Dr. Osman Escalante. Bahamian speaking interpretor was used for the encounter. Data reviewed or ordered today:       Other problems include:  Patient Active Problem List   Diagnosis Code    Supervision of other high risk pregnancies, second trimester O09.892    History of herpes genitalis Z86.19    Supervision of other normal pregnancy, antepartum Z34.80    Positive GBS test B95.1    Pelvic pain affecting pregnancy in third trimester, antepartum O26.893, R10.2       Medications:  Current Outpatient Prescriptions   Medication Sig Dispense Refill    ibuprofen (MOTRIN) 800 mg tablet Take 1 Tab by mouth every eight (8) hours. 30 Tab 0    docusate sodium (COLACE) 100 mg capsule Take 1 Cap by mouth two (2) times daily as needed for Constipation for up to 90 days. 30 Cap 0    sodium chloride (SALINE NASAL) 0.65 % nasal spray 1 spray by Both Nostrils route as needed for Congestion. 30 mL 0    PRENATAL VIT/IRON FUMARATE/FA (PRENATAL PO) Take  by mouth. Allergies:  No Known Allergies      Social History     Social History    Marital status: SINGLE     Spouse name: N/A    Number of children: N/A    Years of education: N/A     Occupational History    Not on file.      Social History Main Topics    Smoking status: Never Smoker    Smokeless tobacco: Never Used    Alcohol use No    Drug use: No    Sexual activity: Yes     Other Topics Concern    Not on file     Social History Narrative       Family History   Problem Relation Age of Onset    Thyroid Disease Mother     Asthma Mother    [facility] Other Mother      enlarged heart    Diabetes Other      Aunt and uncle - maternal        ROS:  Chest Pain:  No  SOB:  No      Physical Exam  Visit Vitals    /65    Pulse 68    Temp 98 °F (36.7 °C) (Oral)    Resp 16    Ht 5' 4\" (1.626 m)    Wt 182 lb (82.6 kg)    SpO2 100%    BMI 31.24 kg/m2     BP Readings from Last 3 Encounters:   03/29/17 110/65   03/02/17 96/55   02/22/17 111/71     Constitutional: appears well, no acute distress, smiling  HEENT: NC/AT, EOM  : gross blood seen on speculum exam, no tenderness or discharge   Psychiatric: affect normal.    Assessment/Plan:       ICD-10-CM ICD-9-CM    1. Vaginal bleeding N93.9 623.8 AMB POC URINALYSIS DIP STICK AUTO W/O MICRO      AMB POC HEMOGLOBIN (HGB)     -make appointment with Dr. Dave Sanchez to further manage    -go to ED if signs/symptoms worsen or new signs/symptos develop  Patient discussed with FM attending, Dr. Stevenson Jeffers.       Kerri Segura MD  Family Medicine/Sports Medicine Fellow PGY4

## 2017-03-29 NOTE — MR AVS SNAPSHOT
Visit Information Belkis Conway y Lokimil Personal Médico Departamento Teléfono del [facility]. Número de visita 3/29/2017  1:40 PM Heladio Quiroz MD [address] [facility] [phone] Your Appointments 3/31/2017 10:30 AM  
SAME DAY with Roberto Blanc MD  
[address] [facility] [address] [place] [address]) Appt Note: vaginal bleeding [id number] per Dr. Tata Hankins. [place] [address]  
[phone]  
  
   
 [address] [facility] 99 [id number] Upcoming Health Maintenance Date Due  
 PAP AKA CERVICAL CYTOLOGY 6/25/2017 DTaP/Tdap/Td series (2 - Td) 12/7/2026 Alergias  Review Complete El: 3/29/2017 Por: John Causey LPN A partir del:  3/29/2017 No Known Allergies Vacunas actuales Revisadas el:  12/7/2016 Bernard Garry Influenza Vaccine (Quad) PF 12/7/2016, 12/26/2014 Tdap 12/7/2016, 12/26/2014 No revisadas esta visita You Were Diagnosed With   
  
 Glennette Princess Vaginal bleeding    -  Primary ICD-10-CM: N93.9 ICD-9-CM: 623.8 Partes vitales PS Pulso Temperatura Resp [place] ( percentil de crecimiento) Peso (percentil de crecimiento) 110/65 68 98 °F (36.7 °C) (Oral) 16 5' 4\" (1.626 m) 182 lb (82.6 kg) SpO2 BMI (IMC) Estado obstétrico Estatus de tabaquísmo 100% 31.24 kg/m2 Recent pregnancy Never Smoker Historial de signos vitales BMI and BSA Data Body Mass Index Body Surface Area  
 31.24 kg/m 2 1.93 m 2 [facility] Name Phone [facility] [id number] [phone] [place] lista de medicamentos actualizada Lista actualizada el: 3/29/17  2:50 PM.  [place] use oakley lista de medicamentos más reciente. docusate sodium 100 mg capsule También conocido kathryn:  Mariusz Barefoot Take 1 Cap by mouth two (2) times daily as needed for Constipation for up to 90 days. ibuprofen 800 mg tablet También conocido kathryn:  MOTRIN Take 1 Tab by mouth every eight (8) hours. PRENATAL PO Take  by mouth.  
  
 sodium chloride 0.65 % nasal spray También conocido kathryn:  SALINE NASAL  
1 spray by Both Nostrils route as needed for Congestion. Hicimos lo siguiente AMB POC HEMOGLOBIN (HGB) [26858 CPT(R)] AMB POC URINALYSIS DIP STICK AUTO W/O MICRO [25944 CPT(R)] Introducing [facility]! [facility] introduce portal paciente MyChart . Ahora se puede acceder a partes de rodriguez expediente médico, enviar por correo electrónico la oficina de rodriguez médico y solicitar renovaciones de medicamentos en línea. En rodriguez navegador de Internet , Dahiana  a [email or web address] Alfredito clic en el usuario por Rosalee Atkins? Daphnie Rideau clic aquí en la sesión Aamir Livingston. Verá la página de registro [place]. Ingrese rodriguez código de [employer] manuel y kathryn aparece a continuación. Usted no tendrá que [employer] código después de maryuri completado el proceso de registro . Si usted no se inscribe antes de la fecha de caducidad , debe solicitar un nuevo código. · MyChart Código de acceso : [email or web address] Expires: 4/20/2017 10:48 AM 
 
Ingresa los últimos cuatro dígitos de rodriguez Número de Seguro Social ( xxxx ) y fecha de nacimiento ( dd / mm / aaaa ) kathryn se indica y alfredito clic en Enviar. Usted será llevado a la siguiente página de registro . Crear un ID MyChart . Esta será rodriguez ID de inicio de sesión de MyChart y no puede ser [place] , por lo que pensar en sima que es Noemi Leech y fácil de recordar . Crear sima contraseña MyChart . Usted puede cambiar rodriguez contraseña en cualquier momento . Ingrese rodriguez Password Reset de preguntas y [place] . [place] se puede utilizar en un momento posterior si usted olvida rodriguez contraseña.  
Introduzca rodriguez dirección de correo electrónico . Maxxyanet Espino recibirá sima notificación por correo electrónico cuando la nueva información está disponible en MyChart . Be Hoots clic en Registrarse. Shannon Zapataters milagro y descargar porciones de rodriguez expediente médico. 
Braulio clic en el enlace de descarga del menú Resumen para descargar sima copia portátil de rodriguez información médica . Si tiene [employer] , por favor visite la sección de preguntas frecuentes del sitio web MyChart . Recuerde, MyChart NO es que se utilizará para las necesidades urgentes. Para emergencias médicas , llame al 911 . Ahora disponible en rodriguez iPhone y Android ! Por favor proporcione raissa resumen de la documentación de cuidado a rodriguez próximo proveedor. Your primary care clinician is listed as Penny Barber. If you have any questions after today's visit, please call [phone].

## 2017-03-30 NOTE — PROGRESS NOTES
I reviewed with the resident the medical history and the resident's findings on the physical examination. I discussed with the resident the patient's diagnosis and concur with the plan. Dr. Dwayne Talbot spoke with Dr. Niya Hanley and she said that patient could wait to see her on Friday.    Hgb 12+  To start Fe + Vit C

## 2017-03-31 ENCOUNTER — HOSPITAL ENCOUNTER (OUTPATIENT)
Dept: LAB | Age: 32
Discharge: HOME | End: 2017-03-31
Payer: SUBSIDIZED

## 2017-03-31 ENCOUNTER — OFFICE VISIT (OUTPATIENT)
Dept: FAMILY MEDICINE CLINIC | Age: 32
End: 2017-03-31

## 2017-03-31 VITALS
RESPIRATION RATE: 16 BRPM | HEART RATE: 78 BPM | SYSTOLIC BLOOD PRESSURE: 109 MMHG | DIASTOLIC BLOOD PRESSURE: 76 MMHG | BODY MASS INDEX: 31.07 KG/M2 | OXYGEN SATURATION: 96 % | HEIGHT: 64 IN | WEIGHT: 182 LBS | TEMPERATURE: 98.8 F

## 2017-03-31 DIAGNOSIS — N93.9 VAGINAL BLEEDING: Primary | ICD-10-CM

## 2017-03-31 DIAGNOSIS — Z12.4 SCREENING FOR CERVICAL CANCER: ICD-10-CM

## 2017-03-31 DIAGNOSIS — R30.0 DYSURIA: ICD-10-CM

## 2017-03-31 LAB
BILIRUB UR QL STRIP: NEGATIVE
GLUCOSE UR-MCNC: NEGATIVE MG/DL
HCG URINE, QL. (POC): NEGATIVE
KETONES P FAST UR STRIP-MCNC: NEGATIVE MG/DL
PH UR STRIP: 5 [PH] (ref 4.6–8)
PROT UR QL STRIP: NEGATIVE MG/DL
SP GR UR STRIP: 1.01 (ref 1–1.03)
UA UROBILINOGEN AMB POC: NORMAL (ref 0.2–1)
URINALYSIS CLARITY POC: CLEAR
URINALYSIS COLOR POC: YELLOW
URINE BLOOD POC: NORMAL
URINE LEUKOCYTES POC: NEGATIVE
URINE NITRITES POC: NEGATIVE
VALID INTERNAL CONTROL?: YES

## 2017-03-31 PROCEDURE — 88142 CYTOPATH C/V THIN LAYER: CPT | Performed by: FAMILY MEDICINE

## 2017-03-31 PROCEDURE — 87624 HPV HI-RISK TYP POOLED RSLT: CPT | Performed by: FAMILY MEDICINE

## 2017-03-31 NOTE — PROGRESS NOTES
Chief Complaint   Patient presents with    Vaginal Bleeding     pt states vaginal bleeding for one month     1. Have you been to the ER, urgent care clinic since your last visit? Hospitalized since your last visit? No    2. Have you seen or consulted any other health care providers outside of the [address] [place] [address] since your last visit? Include any pap smears or colon screening. No    Pt rates vaginal pain as a 5 on pain scale.

## 2017-03-31 NOTE — PROGRESS NOTES
Laureen Mejia  27 y.o. female  1985  [address]  [id number]   [address]: Progress Note  Nabor Anderson MD       Encounter Date: 3/31/2017    Chief Complaint   Patient presents with    Vaginal Bleeding     pt states vaginal bleeding for one month     History of Present Illness   Beti Cameron is a 27 y.o. female who presents to clinic today for vaginal bleeding. Pt is s/p   and has noticed constant bright red bleeding. No contraception. No sexual relations. She is currently breast and formula feeding. Denies any abdominal pain, fever, chills, nausea, vomiting. She does note a headache. She also notes some malodorous discharge as well. Duration 4 wks. Review of Systems   Review of Systems   Constitutional: Negative for chills and fever. Gastrointestinal: Negative for abdominal pain, nausea and vomiting. Genitourinary: Positive for dysuria. Negative for flank pain. Vitals/Objective:     Vitals:    17 1036   BP: 109/76   Pulse: 78   Resp: 16   Temp: 98.8 °F (37.1 °C)   TempSrc: Oral   SpO2: 96%   Weight: 182 lb (82.6 kg)   Height: 5' 4\" (1.626 m)     Body mass index is 31.24 kg/(m^2). Physical Exam   Constitutional: She appears well-developed and well-nourished. No distress. Cardiovascular: Normal rate, regular rhythm and normal heart sounds. Exam reveals no gallop and no friction rub. No murmur heard. Pulmonary/Chest: Effort normal and breath sounds normal. No respiratory distress. She has no wheezes. She has no rales. Abdominal: Soft. Bowel sounds are normal. She exhibits no distension. There is no tenderness. There is no rebound and no guarding. Genitourinary: Vagina normal. Uterus is not tender. Cervix exhibits discharge. Cervix exhibits no motion tenderness. Right adnexum displays no mass and no tenderness. Left adnexum displays no mass and no tenderness. No tenderness in the vagina.  No vaginal discharge found. Genitourinary Comments: Scant blood present at the cervical os. Cervical os closed. No evidence of blood in the vault. Skin: She is not diaphoretic. Vitals reviewed. Recent Results (from the past 24 hour(s))   AMB POC URINALYSIS DIP STICK AUTO W/O MICRO    Collection Time: 03/31/17 10:48 AM   Result Value Ref Range    Color (UA POC) Yellow     Clarity (UA POC) Clear     Glucose (UA POC) Negative Negative    Bilirubin (UA POC) Negative Negative    Ketones (UA POC) Negative Negative    Specific gravity (UA POC) 1.015 1.001 - 1.035    Blood (UA POC) Trace Negative    pH (UA POC) 5.0 4.6 - 8.0    Protein (UA POC) Negative Negative mg/dL    Urobilinogen (UA POC) 0.2 mg/dL 0.2 - 1    Nitrites (UA POC) Negative Negative    Leukocyte esterase (UA POC) Negative Negative     Assessment and Plan:   1. Vaginal bleeding  UPT pending likely negative as per pt hx.   - AMB POC URINALYSIS DIP STICK AUTO W/O MICRO  - NUSWAB VAGINITIS PLUS    2. Dysuria  UA negative  - AMB POC URINE, MICROALBUMIN, SEMIQUANT (3 RESULTS)    3. Screening for cervical cancer  Results pending  - PAP, HPV HIGH RISK    4. Postpartum hemorrhage, unspecified type  Check ultrasound and routine labs for possible coagulopathy. Since cervical os closed not likely retained products. However, ultrasound would delineate further.   - US TRANSVAGINAL; Future  - US PELV NON OBS; Future  - CBC W/O DIFF  - PROTHROMBIN TIME + INR  - VON WILLEBRAND FACTOR (VWF) SCREENING PROFILE (ESOTERIX)      I have discussed the diagnosis with the patient and the intended plan as seen in the above orders. she has expressed understanding. The patient has received an after-visit summary and questions were answered concerning future plans. I have discussed medication side effects and warnings with the patient as well. Follow-up Disposition:  Return in about 2 weeks (around 4/14/2017) for PP check.     Electronically Signed: Umm Cruz MD History   Patients past medical, surgical and family histories were reviewed and updated. Past Medical History:   Diagnosis Date    Anemia     History of herpes genitalis      death     First child -  after 1 day. Baby didn't have kidneys, heart problem. 6 toes, 6 fingers, 40 weeks gestation. Was told genetic, but no testing. Same father of baby. Never tested either. FOB two nephews with autism. History reviewed. No pertinent surgical history. Family History   Problem Relation Age of Onset    Thyroid Disease Mother     Asthma Mother     Other Mother      enlarged heart    Diabetes Other      Aunt and uncle - maternal      Social History     Social History    Marital status: SINGLE     Spouse name: N/A    Number of children: N/A    Years of education: N/A     Occupational History    Not on file. Social History Main Topics    Smoking status: Never Smoker    Smokeless tobacco: Never Used    Alcohol use No    Drug use: No    Sexual activity: Yes     Other Topics Concern    Not on file     Social History Narrative            Current Medications/Allergies     Current Outpatient Prescriptions   Medication Sig Dispense Refill    ibuprofen (MOTRIN) 800 mg tablet Take 1 Tab by mouth every eight (8) hours. 30 Tab 0    docusate sodium (COLACE) 100 mg capsule Take 1 Cap by mouth two (2) times daily as needed for Constipation for up to 90 days. 30 Cap 0    sodium chloride (SALINE NASAL) 0.65 % nasal spray 1 spray by Both Nostrils route as needed for Congestion. 30 mL 0    PRENATAL VIT/IRON FUMARATE/FA (PRENATAL PO) Take  by mouth.        No Known Allergies

## 2017-03-31 NOTE — MR AVS SNAPSHOT
Visit Information [place] Gurwinder Ramos Personal Médico Departamento Teléfono del Dep. Número de visita 3/31/2017 10:30 AM Benitasuzy Hipolito, [address] [facility] [phone] Follow-up Instructions Return in about 2 weeks (around 4/14/2017) for PP check. Upcoming Health Maintenance Date Due  
 PAP AKA CERVICAL CYTOLOGY 6/25/2017 DTaP/Tdap/Td series (2 - Td) 12/7/2026 Al[place]ias  Review Complete El: 3/31/2017 Por: MD Liliana Bowling Alessandro del:  3/31/2017 No Known Allergies Vacunas actuales Revisadas el:  12/7/2016 Luis Fernando Diego Influenza Vaccine (Quad) PF 12/7/2016, 12/26/2014 Tdap 12/7/2016, 12/26/2014 No revisadas esta visita You Were Diagnosed With   
  
 Servando Bigger Vaginal bleeding    -  Primary ICD-10-CM: N93.9 ICD-9-CM: 623.8 Dysuria     ICD-10-CM: R30.0 ICD-9-CM: 788.1 Screening for cervical cancer     ICD-10-CM: Z12.4 ICD-9-CM: V76.2 Postpartum hemorrhage, unspecified type     ICD-10-CM: O72.1 ICD-9-CM: 666.10 Partes vitales PS Pulso Temperatura Resp [place] ( percentil de crecimiento) Peso (percentil de crecimiento) 109/76 78 98.8 °F (37.1 °C) (Oral) 16 5' 4\" (1.626 m) 182 lb (82.6 kg) SpO2 Está amamantando? BMI [facility]) Estado obstétrico Estatus de tabaquísmo 96% Yes 31.24 kg/m2 Recent pregnancy Never Smoker Historial de signos vitales BMI and BSA Data Body Mass Index Body Surface Area  
 31.24 kg/m 2 1.93 m 2 [place] [facility] Name Phone [facility] [place], [address] [phone] [place] lista de medicamentos actualizada Lista actualizada el: 3/31/17 11:06 AM.  Clarance Dock use oalkey lista de medicamentos más reciente. docusate sodium 100 mg capsule También conocido kathryn:  Camilo Jayme Take 1 Cap by mouth two (2) times daily as needed for Constipation for up to 90 days. ibuprofen 800 mg tablet También conocido kathryn:  MOTRIN Take 1 Tab by mouth every eight (8) hours. PRENATAL PO Take  by mouth.  
  
 sodium chloride 0.65 % nasal spray También conocido kathryn:  SALINE NASAL  
1 spray by Both Nostrils route as needed for Congestion. Hicimos lo siguiente AMB POC URINALYSIS DIP STICK AUTO W/O MICRO [65883 CPT(R)] AMB POC URINE PREGNANCY TEST, VISUAL COLOR COMPARISON [56866 CPT(R)] CBC W/O DIFF [00611 CPT(R)] [address] [id number] Custom] PAP, HPV HIGH RISK [61603 CPT(R)] PROTHROMBIN TIME + INR [24604 CPT(R)] VON WILLEBRAND FACTOR (VWF) SCREENING PROFILE (ESOTERIX) [132981 Custom] Instrucciones de seguimiento Return in about 2 weeks (around 4/14/2017) for PP check. Por hacer 04/07/2017 Imaging:  US PELV NON OBS   
  
 04/07/2017 Imaging:  US TRANSVAGINAL Instrucciones para el Paciente Pap Test: Care Instructions Your Care Instructions The Pap test (also called a Pap smear) is a screening test for cancer of the cervix, which is the lower part of the uterus that opens into the vagina. The test can help your doctor find early changes in the cells that could lead to cancer. The sample of cells taken during your test has been sent to a lab so that an expert can look at the cells. It usually takes a week or two to get the results back. Follow-up care is a key part of your treatment and safety. Be sure to make and go to all appointments, and call your doctor if you are having problems. It's also a good idea to know your test results and keep a list of the medicines you take. What do the results mean? · A normal result means that the test did not find any abnormal cells in the sample. · An abnormal result can mean many things. Most of these are not cancer. The results of your test may be abnormal because: ¨ You have an infection of the vagina or cervix, such as a yeast infection. ¨ You have an IUD (intrauterine device for birth control). ¨ You have low estrogen levels after menopause that are causing the cells to change. ¨ You have cell changes that may be a sign of precancer or cancer. The results are ranked based on how serious the changes might be. There are many other reasons why you might not get a normal result. If the results were abnormal, you may need to get another test within a few weeks or months. If the results show changes that could be a sign of cancer, you may need a test called a colposcopy, which provides a more complete view of the cervix. Sometimes the lab cannot use the sample because it does not contain enough cells or was not preserved well. If so, you may need to have the test again. This is not common, but it does happen from time to time. When should you call for help? Watch closely for changes in your health, and be sure to contact your doctor if: 
· You have vaginal bleeding or pain for more than 2 days after the test. It is normal to have a small amount of bleeding for a day or two after the test. 
Where can you learn more? Go to http://greg-kelvin.info/. Enter W300 in the search box to learn more about \"Pap Test: Care Instructions. \" Current as of: July 26, 2016 Content Version: 11.2 © 3168-4387 [employer]. Care instructions adapted under license by [employer] (which disclaims liability or warranty for this information). If you have questions about a medical condition or this instruction, always ask your healthcare professional. [place] [address] any warranty or liability for your use of this information. Vaginal Bleeding in Nonpregnant Women: Care Instructions Your Care Instructions Many women have bleeding or spotting between periods.  Lots of things can cause it. You may bleed because of hormone problems, stress, or ovulation. Fibroids and IUDs (intrauterine devices) can also cause bleeding. If your bleeding or spotting is caused by one of these things and is not heavy or doesn't happen often, you probably don't need to worry. But in rare cases, infection, cancer, or other serious conditions can cause bleeding. So you may need more tests to find the cause of your bleeding. The doctor has checked you carefully, but problems can develop later. If you notice any problems or new symptoms, get medical treatment right away. Follow-up care is a key part of your treatment and safety. Be sure to make and go to all appointments, and call your doctor if you are having problems. It's also a good idea to know your test results and keep a list of the medicines you take. How can you care for yourself at home? · Take pain medicines exactly as directed. ¨ If the doctor gave you a prescription medicine for pain, take it as prescribed. ¨ If you are not taking a prescription pain medicine, ask your doctor if you can take an over-the-counter medicine. Do not take aspirin, which may make bleeding worse. · If your doctor prescribed birth control pills for your bleeding, take them as directed. · Eat foods that are high in iron and vitamin C. Foods high in iron include red meat, shellfish, eggs, beans, and leafy green vegetables. Foods high in vitamin C include citrus fruits, tomatoes, and broccoli. Ask your doctor if you need to take iron pills or a multivitamin. · Ask your doctor when it is okay to have sex. When should you call for help? Call 911 anytime you think you may need emergency care. For example, call if: 
· You passed out (lost consciousness). · You have sudden, severe pain in your belly or pelvis. Call your doctor now or seek immediate medical care if: 
· You have severe vaginal bleeding.  You are soaking through your usual pads or tampons each hour for 2 or more hours. · You are dizzy or lightheaded or you feel like you may faint. · You have new belly or pelvic pain. · You have a fever. · Your bleeding gets worse. Watch closely for changes in your health, and be sure to contact your doctor if: 
· You have new or worse vaginal discharge. · You do not get better as expected. Where can you learn more? Go to http://greg-kelvin.info/. Enter U481 in the search box to learn more about \"Vaginal Bleeding in Nonpregnant Women: Care Instructions. \" Current as of: October 13, 2016 Content Version: 11.2 © 6000-5752 [employer]. Care instructions adapted under license by [employer] (which disclaims liability or warranty for this information). If you have questions about a medical condition or this instruction, always ask your healthcare professional. [address] any warranty or liability for your use of this information. Introducing [facility]! [facility] introduce portal paciente The Local . Ahora se puede acceder a partes de rodriguez expediente médico, enviar por correo electrónico la oficina de rodriguez médico y solicitar renovaciones de medicamentos en línea. En rodriguez navegador de Internet , Brianna Reyes a [email or web address] Annapurna [employer]/GiveMeSport Alfredito clic en el usuario por Rajesh Del Valle? Harl Mikemen clic aquí en la sesión [facility]. Verá la página de registro [place]. Ingrese rodriguez código de [employer] manuel y kathryn aparece a continuación. Usted no tendrá que [employer] código después de maryuri completado el proceso de registro . Si usted no se inscribe antes de la fecha de caducidad , debe solicitar un nuevo código. · MyChart Código de acceso : [id number] Expires: 4/20/2017 10:48 AM 
 
Ingresa los últimos cuatro dígitos de rodriguez Número de Seguro Social ( xxxx ) y fecha de nacimiento ( dd / mm / aaaa ) kathryn se indica y alfredito clic en Enviar. Usted será llevado a la siguiente página de registro . Crear un ID MyChart . Esta será rodriguez ID de inicio de sesión de MyChart y no puede ser [place] , por lo que pensar en sima que es Jennie Sis y fácil de recordar . Crear sima contraseña MyChart . Usted puede cambiar rodriguez contraseña en cualquier momento . Ingrese rodriguez Password Reset de preguntas y [place] . [place] se puede utilizar en un momento posterior si usted olvida rodriguez contraseña. Introduzca rodriguez dirección de correo electrónico . Hoy Reining recibirá sima notificación por correo electrónico cuando la nueva información está disponible en MyChart . Pama Hugger clic en Registrarse. Clearance Sitter milagro y descargar porciones de rodriguez expediente médico. 
Braulio clic en el enlace de descarga del menú Resumen para descargar sima copia portátil de rodriguez información médica . Si tiene [employer] , por favor visite la sección de preguntas frecuentes del sitio web MyChart . Recuerde, MyChart NO es que se utilizará para las necesidades urgentes. Para emergencias médicas , llame al 911 . Ahora disponible en rodriguez iPhone y Android ! Por favor proporcione raissa resumen de la documentación de cuidado a rodriguez próximo proveedor. Your primary care clinician is listed as Nicole Mejia. If you have any questions after today's visit, please call [phone].

## 2017-03-31 NOTE — PATIENT INSTRUCTIONS
Pap Test: Care Instructions  Your Care Instructions  The Pap test (also called a Pap smear) is a screening test for cancer of the cervix, which is the lower part of the uterus that opens into the vagina. The test can help your doctor find early changes in the cells that could lead to cancer. The sample of cells taken during your test has been sent to a lab so that an expert can look at the cells. It usually takes a week or two to get the results back. Follow-up care is a key part of your treatment and safety. Be sure to make and go to all appointments, and call your doctor if you are having problems. It's also a good idea to know your test results and keep a list of the medicines you take. What do the results mean? · A normal result means that the test did not find any abnormal cells in the sample. · An abnormal result can mean many things. Most of these are not cancer. The results of your test may be abnormal because:  ¨ You have an infection of the vagina or cervix, such as a yeast infection. ¨ You have an IUD (intrauterine device for birth control). ¨ You have low estrogen levels after menopause that are causing the cells to change. ¨ You have cell changes that may be a sign of precancer or cancer. The results are ranked based on how serious the changes might be. There are many other reasons why you might not get a normal result. If the results were abnormal, you may need to get another test within a few weeks or months. If the results show changes that could be a sign of cancer, you may need a test called a colposcopy, which provides a more complete view of the cervix. Sometimes the lab cannot use the sample because it does not contain enough cells or was not preserved well. If so, you may need to have the test again. This is not common, but it does happen from time to time. When should you call for help?   Watch closely for changes in your health, and be sure to contact your doctor if:  · You have vaginal bleeding or pain for more than 2 days after the test. It is normal to have a small amount of bleeding for a day or two after the test.  Where can you learn more? Go to http://greg-kelvin.info/. Enter Z081 in the search box to learn more about \"Pap Test: Care Instructions. \"  Current as of: July 26, 2016  Content Version: 11.2  © 7733-4249 [employer]. Care instructions adapted under license by [employer] (which disclaims liability or warranty for this information). If you have questions about a medical condition or this instruction, always ask your healthcare professional. [place] [address] any warranty or liability for your use of this information. Vaginal Bleeding in Nonpregnant Women: Care Instructions  Your Care Instructions    Many women have bleeding or spotting between periods. Lots of things can cause it. You may bleed because of hormone problems, stress, or ovulation. Fibroids and IUDs (intrauterine devices) can also cause bleeding. If your bleeding or spotting is caused by one of these things and is not heavy or doesn't happen often, you probably don't need to worry. But in rare cases, infection, cancer, or other serious conditions can cause bleeding. So you may need more tests to find the cause of your bleeding. The doctor has checked you carefully, but problems can develop later. If you notice any problems or new symptoms, get medical treatment right away. Follow-up care is a key part of your treatment and safety. Be sure to make and go to all appointments, and call your doctor if you are having problems. It's also a good idea to know your test results and keep a list of the medicines you take. How can you care for yourself at home? · Take pain medicines exactly as directed. ¨ If the doctor gave you a prescription medicine for pain, take it as prescribed.   ¨ If you are not taking a prescription pain medicine, ask your doctor if you can take an over-the-counter medicine. Do not take aspirin, which may make bleeding worse. · If your doctor prescribed birth control pills for your bleeding, take them as directed. · Eat foods that are high in iron and vitamin C. Foods high in iron include red meat, shellfish, eggs, beans, and leafy green vegetables. Foods high in vitamin C include citrus fruits, tomatoes, and broccoli. Ask your doctor if you need to take iron pills or a multivitamin. · Ask your doctor when it is okay to have sex. When should you call for help? Call 911 anytime you think you may need emergency care. For example, call if:  · You passed out (lost consciousness). · You have sudden, severe pain in your belly or pelvis. Call your doctor now or seek immediate medical care if:  · You have severe vaginal bleeding. You are soaking through your usual pads or tampons each hour for 2 or more hours. · You are dizzy or lightheaded or you feel like you may faint. · You have new belly or pelvic pain. · You have a fever. · Your bleeding gets worse. Watch closely for changes in your health, and be sure to contact your doctor if:  · You have new or worse vaginal discharge. · You do not get better as expected. Where can you learn more? Go to http://greg-kelvin.info/. Enter Z509 in the search box to learn more about \"Vaginal Bleeding in Nonpregnant Women: Care Instructions. \"  Current as of: October 13, 2016  Content Version: 11.2  © 2872-6725 [employer]. Care instructions adapted under license by [employer] (which disclaims liability or warranty for this information). If you have questions about a medical condition or this instruction, always ask your healthcare professional. [place] [address] any warranty or liability for your use of this information.

## 2017-04-05 LAB
A VAGINAE DNA VAG QL NAA+PROBE: NORMAL SCORE
BVAB2 DNA VAG QL NAA+PROBE: NORMAL SCORE
C ALBICANS DNA VAG QL NAA+PROBE: NEGATIVE
C GLABRATA DNA VAG QL NAA+PROBE: NEGATIVE
C TRACH RRNA SPEC QL NAA+PROBE: NEGATIVE
MEGA1 DNA VAG QL NAA+PROBE: NORMAL SCORE
N GONORRHOEA RRNA SPEC QL NAA+PROBE: NEGATIVE
T VAGINALIS RRNA SPEC QL NAA+PROBE: NEGATIVE

## 2017-04-06 ENCOUNTER — TELEPHONE (OUTPATIENT)
Dept: FAMILY MEDICINE CLINIC | Age: 32
End: 2017-04-06

## 2017-04-06 LAB
APTT PPP: 27.8 SEC
ERYTHROCYTE [DISTWIDTH] IN BLOOD BY AUTOMATED COUNT: 13.4 % (ref 12.3–15.4)
FACT VIII ACT/NOR PPP: 66 %
HCT VFR BLD AUTO: 36.5 % (ref 34–46.6)
HGB BLD-MCNC: 12.1 G/DL (ref 11.1–15.9)
INR PPP: 1 (ref 0.8–1.2)
MCH RBC QN AUTO: 29.2 PG (ref 26.6–33)
MCHC RBC AUTO-ENTMCNC: 33.2 G/DL (ref 31.5–35.7)
MCV RBC AUTO: 88 FL (ref 79–97)
PLATELET # BLD AUTO: 195 X10E3/UL (ref 150–379)
PROTHROMBIN TIME: 10.4 SEC (ref 9.1–12)
RBC # BLD AUTO: 4.15 X10E6/UL (ref 3.77–5.28)
VWF AG ACT/NOR PPP IA: 68 %
VWF:RCO ACT/NOR PPP PL AGG: 54 %
WBC # BLD AUTO: 6.7 X10E3/UL (ref 3.4–10.8)

## 2017-04-06 NOTE — TELEPHONE ENCOUNTER
Left message in Brazilian advising lab work was with in normal limits and she is to plan on attending her previously scheduled appointment with Dr. Thomas Munoz on 4/14/2017 at 11:15 am.

## 2017-04-06 NOTE — TELEPHONE ENCOUNTER
----- Message from Nancy Gutierrez sent at 4/6/2017  3:01 PM EDT -----  Regarding: Dr. Donta Finch  Pt states she received a call about an appt., April 14, 17 at 11:15 was verified with her, pt requesting a call back to discuss.   Her best contact number is [phone]

## 2017-04-06 NOTE — TELEPHONE ENCOUNTER
----- Message from Deng Santos MD sent at 4/6/2017 11:30 AM EDT -----  Call:   All of your labs are normal.  Keep your scheduled appointment for your postpartum exam.

## 2017-04-07 ENCOUNTER — HOSPITAL ENCOUNTER (OUTPATIENT)
Dept: ULTRASOUND IMAGING | Age: 32
Discharge: HOME | End: 2017-04-07
Attending: FAMILY MEDICINE
Payer: SUBSIDIZED

## 2017-04-07 PROCEDURE — 76830 TRANSVAGINAL US NON-OB: CPT

## 2017-04-07 PROCEDURE — 76856 US EXAM PELVIC COMPLETE: CPT

## 2017-04-07 NOTE — TELEPHONE ENCOUNTER
Spoke with pt using Venezuelan interpretor Ander Byrd to notify pt that all labs are normal and to keep scheduled appt on 4/14/16 for 6 weed postpartum exam. Pt verbalized understanding.

## 2017-04-11 ENCOUNTER — TELEPHONE (OUTPATIENT)
Dept: FAMILY MEDICINE CLINIC | Age: 32
End: 2017-04-11

## 2017-04-11 NOTE — TELEPHONE ENCOUNTER
TC made to pt using Botswanan interpretor Leona Flanagan Lpn to notify pt per Dr. Thomas Munoz notes that ultrasound is negative and to keep scheduled appt on 4/1417 for postpartum examination. Pt verbalized understanding.

## 2017-04-14 ENCOUNTER — ROUTINE PRENATAL (OUTPATIENT)
Dept: FAMILY MEDICINE CLINIC | Age: 32
End: 2017-04-14

## 2017-04-14 VITALS
DIASTOLIC BLOOD PRESSURE: 78 MMHG | HEART RATE: 62 BPM | HEIGHT: 64 IN | BODY MASS INDEX: 31.58 KG/M2 | SYSTOLIC BLOOD PRESSURE: 115 MMHG | TEMPERATURE: 98.4 F | OXYGEN SATURATION: 97 % | WEIGHT: 185 LBS | RESPIRATION RATE: 16 BRPM

## 2017-04-14 NOTE — PROGRESS NOTES
Chief Complaint   Patient presents with    Routine Prenatal Visit     6 week postpardon check     1. Have you been to the ER, urgent care clinic since your last visit? Hospitalized since your last visit? No    2. Have you seen or consulted any other health care providers outside of the [address] [place] [address] since your last visit? Include any pap smears or colon screening.  No

## 2017-04-14 NOTE — PATIENT INSTRUCTIONS
[address]  [phone]    [facility]  [facility] Carlos Cruz 52 · [phone]    Huma Casey métodos anticonceptivos: El implante - [ Aleksandr Ndiaye About Birth Control: The Implant ]  ¿Qué es el implante? El implante se [place] para prevenir el [place]. Es sima varilla delgada del tamaño de un cerillo (fósforo) que se inserta bajo la piel (subcutánea) en la parte interna del brazo. El implante libera la hormona progestina para prevenir Marval Ducking. La progestina previene el [place] de las siguientes formas: Espesa el moco del aga uterino. [place] hace que sea difícil que el esperma ingrese al Susan Boeck. También adelgaza el revestimiento del útero, lo que dificulta que un óvulo fertilizado se adhiera al Susan Boeck. La progestina puede en ocasiones evitar que los ovarios liberen un óvulo cada mes (ovulación). El implante previene el embarazo mine 3 años. Sima vez que es implantado, no tiene que hacer nada más para prevenir el embarazo. El implante solo puede ser insertado y retirado por rodriguez médico u otro profesional de la ladan capacitado. Estos procedimientos pueden hacerse en el consultorio de rodriguez médico y [address]  solo unos minutos. El médico adormece la pieter e \"inyecta\" el implante bajo la piel. No se hacen maguire en la piel. Para retirar el implante, el médico adormece la pieter, hace un pequeño gi en la piel y extrae el implante. ¿Qué fam rolf funciona? El implante funciona [address]. Menos de 1 moiz de cada 100 tiene un embarazo no planificado. Asegúrese de informarle al médico si tiene algún problema de ladan o sobre los medicamentos que navin. Él puede ayudarla a elegir el método anticonceptivo que sea adecuado para usted. ¿Cuáles son Tracy Marchi? · El implante es chavez de los métodos anticonceptivos más eficaces. · Previene el embarazo mine hasta 3 años.  Usted no tiene que [employer] anticonceptivos [employer]. · Es seguro de usar mientras se está amamantando. · El implante no contiene estrógeno. Por lo que puede usarlo si no quiere o no puede alejandro estrógeno debido a que tiene inquietudes o ciertos problemas de ladan. · Puede reducir el sangrado abundante y los cólicos. · Es práctico. Proporciona control de la natalidad constantemente y no es visible. No necesita recordar que debe alejandro sima pastilla ni ponerse sima inyección. No tiene que [employer] sexuales para protegerse del embarazo. ¿Cuáles son Sheran Spruce? · El implante no protege contra las infecciones de transmisión sexual (STI, por dominique siglas en inglés), kathryn el herpes o el VIH/SIDA. Si no está sanchez de si rodriguez deonte sexual pudiera tener sima STI, utilice un condón para protegerse de [place] infección. · Puede causarle períodos menstruales irregulares, o podría tener manchas de Ak Chin períodos. También puede dejar de tener un período. Algunas mujeres jo kathryn sima ventaja el no tener un período. · Puede causar Kaci Nicolas de ánimo, menos interés en el sexo o aumento de [place]. · Tiene que milagro a un médico para que le inserte o le quite un implante. ¿Dónde puede encontrar más información en inglés? Marilin Mcdonnell a http://greg-kelvin.info/. Escriba F276 en la búsqueda para aprender más acerca de \"Aprenda sobre métodos anticonceptivos: El implante - [ Learning About Birth Control: The Implant ]. \"  Revisado: 30 cruz, 2016  Versión del contenido: 11.2  © 8083-8122 [employer]orporated. Las instrucciones de cuidado fueron adaptadas bajo licencia por Good Help Connections (which disclaims liability or warranty for this information). Si usted tiene [place] afección médica o sobre estas instrucciones, siempre pregunte a rodriguez profesional de ladan. [facility]orporated niega toda garantía o responsabilidad por rodriguez uso de esta información.        El implante kathryn método anticonceptivo: Instrucciones de cuidado - [ Implant for Birth Control: Care Instructions ]  Instrucciones de cuidado    El implante se [place] para prevenir el embarazo. Es sima varilla delgada del tamaño de un cerillo (fósforo) que se inserta bajo la piel (subdérmica) en la parte interna del brazo. El implante previene el embarazo mine 2 años. Después de que se coloca, no tiene que hacer [address] para prevenir el embarazo. La atención de seguimiento es sima parte clave de rodriguez tratamiento y seguridad. Asegúrese de hacer y acudir a todas las citas, y llame a rodriguez médico si está teniendo problemas. También es sima buena idea saber los resultados de los exámenes y mantener sima lista de los medicamentos que navin. ¿Cómo puede cuidarse en el hogar? ¿Cómo se [place] el implante subdérmico?  · El implante es insertado y retirado por rodriguez médico u otro profesional de la ladan capacitado. Cole se hace en el consultorio de rodriguez [address] solo unos minutos. · Pregúntele a rodriguez médico si necesita usar un método anticonceptivo de respaldo, kathryn un condón. Y pregunte si (y por cuánto tiempo) debiera evitar tener relaciones sexuales después de la colocación del implante. Es posible que tenga que [place], dependiendo del momento de rodriguez ciclo en que le pongan el implante. ¿Qué más necesita saber? · El implante tiene efectos secundarios. ¨ Es posible que tenga cambios en rodriguez período. Rodriguez período podría interrumpirse. También es posible que tenga manchado o sangrado entre períodos. ¨ Podría tener Kaci Valenzuela de ánimo, menos interés en el sexo o aumento de [place]. · Recuerde que es necesario que después de 3 años de la colocación del implante, se lo retiren o que le pongan chavez nuevo. ¨ Si no reemplaza el implante y no [address] anticonceptivo, Ne. ¨ Si le retiran el implante, tendrá que encontrar otro método anticonceptivo. Si no lo hace, podría quedar embarazada.   ¨ Aun si está usted planeando quedar embarazada, tienen que retirarle el implante. · Consulte con rodriguez médico antes de usar cualquier otro medicamento. [place] incluye medicamentos de venta shai, vitaminas, productos herbarios y suplementos. Las hormonas anticonceptivas podrían no funcionar tan rolf para prevenir el embarazo cuando se combinan con otros medicamentos. · El implante no protege contra las infecciones de transmisión sexual (STI, por dominique siglas en inglés), kathryn el herpes o el VIH/SIDA. Si no está sanchez de si rodriguez deonte sexual pudiera tener sima STI, utilice un condón para protegerse de [place] infección. ¿Cuándo debe pedir ayuda? Llame a rodriguez médico ahora mismo o busque atención médica inmediata si:  · Tiene dolor abdominal intenso. Preste especial atención a los cambios en rodriguez ladan y asegúrese de comunicarse con rodriguez médico si:  · Piensa que podría estar embarazada. · Tiene problemas con rodriguez método anticonceptivo. · Piensa que puede estar deprimida. · Tiene manchado con regularidad. · [address] a sima infección de transmisión sexual o ya tiene [place]. ¿Dónde puede encontrar más información en inglés? Laureen Yoo a http://greg-kelvin.info/. Justus Garcia H252 en la búsqueda para aprender más acerca de \"El implante kathryn método anticonceptivo: Instrucciones de cuidado - [ Implant for Birth Control: Care Instructions ]. \"  Revisado: 30 cruz, 2016  Versión del contenido: 11.2  © 9412-7804 [place]. Las instrucciones de cuidado fueron adaptadas bajo licencia por Good Help Connections (which disclaims liability or warranty for this information). Si usted tiene [place] afección médica o sobre estas instrucciones, siempre pregunte a rodriguez profesional de ladan. [employer] niega toda garantía o responsabilidad por rodriguez uso de esta información.        Después del embarazo: Ejercicios - [ After Pregnancy: Exercises ]  Instrucciones de cuidado  Estos son Guillermo Billjenni de ejercicios que pueden ayudarle después del embarazo. Comience cada ejercicio lentamente. Reduzca la intensidad del ejercicio si Anel  a sentir dolor. Oakley médico o el fisioterapeuta le dirán cuándo puede comenzar con estos ejercicios y cuáles funcionarán mejor para usted. Cómo se hacen los ejercicios  Metida del abdomen    1. Acuéstese sobre la espalda y [employer] dedos marco dentro de los huesos de la cadera de manera que pueda sentir los músculos de la parte baja del abdomen. 2. Respire profundamente. 3. A medida que gustavo salir el aire, jale el ombligo hacia la columna, kathryn si tratara de subir la cremallera de unos pantalones ajustados. Usted deberá sentir que los músculos de la parte baja del abdomen se alejan de dominique dedos a medida que los músculos se tensan. 4. Mantenga la posición por unos 6 segundos, parag no contenga la respiración. 5. Repita de 8 a 12 veces. 6. Repita raissa ejercicio varias veces al día y trate de [employer] músculos de la parte baja del abdomen adentro cada vez por más tiempo, conforme se [place] fortaleciendo. 7. Practique raissa ejercicio mientras está de pie, kathryn cuando espera en sima skyler o cuando está sentada. Deslizamientos de talón    1. Acuéstese en el suelo con las rodillas flexionadas y [employer] dedos marco dentro de los huesos de la cadera de manera que pueda sentir los músculos de la parte baja del abdomen. Los pies deben estar apoyados sobre el suelo. 2. Hunda el ombligo hacia adentro y [place] la columna. Usted deberá sentir que los músculos de la parte baja del abdomen se alejan de dominique dedos a medida que los músculos se tensan. 3. Siga manteniendo el ombligo adentro a medida que desliza un pie sobre el suelo hasta que la pierna esté estirada. 4. Vuelva a deslizar lentamente la pierna a la posición inicial mientras se asegura de mantener el ombligo adentro. 5. No arquee ni mueva la espalda mientras lo hace. No contenga la respiración.   6. Relájese y repita con la otra pierna. 7. Repita de 8 a 12 veces. Estiramiento de rodillas al pecho    1. Acuéstese boca Blaze Dyke con sima rodilla doblada y la otra pierna estirada. 2. Sujete las caroline bajo la rodilla doblada y traiga la rodilla al pecho. Mantenga la parte baja de la espalda presionada contra el suelo. 3. Si mantener la pierna opuesta estirada le produce dolor en la espalda mientras se estira, doble también pietro rodilla y [address] el pie apoyado sobre el piso. 4. Mantenga esta posición por lo menos de 15 a 30 segundos. 5. Relájese y regrese la rodilla a la posición inicial.  6. Repita el ejercicio con la otra pierna. 7. Repita de 2 a 4 veces con cada pierna. Rotación del aga    1. Siéntese en sima silla firme o póngase de pie derecha. 2. Con la barbilla nivelada, gire la pepe hacia la derecha y [place] 15 y 27 segundos. 3. Gire la pepe hacia la izquierda y mantenga la posición entre 15 y 27 segundos. 4. Repita de 2 a 4 veces hacia cada lado. Rotación de hombros    1. Siéntese cómodamente y separe los pies a la distancia de los hombros. También puede hacer raissa ejercicio estando de pie. 2. [place] los hombros Gracey, Zuniga Jaimie atrás y después hacia abajo en un Revonda Katos y circular. 3. Repita de 2 a 4 veces. Estiramiento de la parte media de la espalda    Nota: Si usted tiene dolor en la rodilla, no alfredito raissa ejercicio. 1. Arrodíllese en el piso y [employer] tobillos. 2. Inclínese hacia adelante, ponga las caroline sobre el piso y estire los brazos hacia el frente. Descanse la [employer]. 3. Empuje suavemente con el pecho [employer], llegando lo más lejos hacia el frente que pueda. 4. Mantenga la posición entre 15 y 27 segundos. 5. Repita de 2 a 4 veces. Estiramientos de la espalda    1. [address] y las rodillas en el piso. 2. Relaje la pepe y [facility].  Arquee la espalda hacia el techo, hasta que sienta que las partes deepthi, media y baja se estiran agradablemente. Mantenga raissa estiramiento mine el tiempo que se sienta cómoda, o de 15 a 30 segundos. 3. Vuelva a la posición inicial con la espalda plana mientras está apoyada de caroline y rodillas. 4. Deje que la espalda se nivele empujando el estómago hacia el piso. [address]. 5. Mantenga esta posición entre 15 y 27 segundos. 6. Repita de 2 a 4 veces. Estiramiento del tendón de la corva o isquiotibiales (acostada)    1. Acuéstese boca arriba con las piernas estiradas. Si siente molestia en la espalda, coloque sima toalla pequeña enrollada debajo de la parte baja de la espalda. 2. Sujetando la parte posterior de la pierna, levante la pierna estirada hacia arriba y BREST cuerpo hasta que sienta un estiramiento en la parte posterior del muslo. 3. Mantenga el estiramiento por lo menos mine 30 segundos. 4. Repita de 2 a 4 veces. 5. Cambie de pierna y repita los pasos del 1 al 4. Estiramiento de la pantorrilla    1. Póngase de pie frente a sima pared, con las caroline apoyadas en la pared a la altura de los ojos. Ponga la pierna aproximadamente un paso detrás de la otra. 2. Manteniendo recta la pierna de atrás y el talón de pietro pierna sobre el piso, flexione la rodilla de la pierna de adelante y lleve suavemente la cadera y el pecho hacia la pared hasta que sienta un estiramiento en la pantorrilla de la pierna de atrás. 3. Mantenga el estiramiento de 15 a 30 segundos. 4. Repita de 2 a 4 veces. 5. Repita los pasos del 1 al 4, parag esta vez mantenga doblada la rodilla de la pierna de atrás. 6. Cambie de pierna y repita los pasos del 1 al 5. La atención de seguimiento es sima parte clave de rodriguez tratamiento y seguridad. Asegúrese de hacer y acudir a todas las citas, y llame a rodriguez médico si está teniendo problemas. También es sima buena idea saber los resultados de los exámenes y mantener sima lista de los medicamentos que navin. ¿Dónde puede encontrar más información en inglés?   Isaac Dole a http://greg-kelvin.info/. Stephanie Quarles en la búsqueda para aprender más acerca de \"Después del embarazo: Ejercicios - [ After Pregnancy: Exercises ]. \"  Revisado: 30 cruz, 2016  Versión del contenido: 11.2  © 7287-9386 Healthwise, Incorporated. Las instrucciones de cuidado fueron adaptadas bajo licencia por Good Help Connections (which disclaims liability or warranty for this information). Si usted tiene [place] afección médica o sobre estas instrucciones, siempre pregunte a rodriguez profesional de ladan. Healthwise, Incorporated niega toda garantía o responsabilidad por rodriguez uso de esta información. Después del parto (período de posparto): Instrucciones de cuidado - [ After Your Delivery (the Postpartum Period): Care Instructions ]  Instrucciones de cuidado  Felicidades por el nacimiento de rodriguez bebé. Al igual que el Geanie Delroy, el tiempo con el recién nacido puede ser un momento de [place], Isrrael Rodas y agotamiento. Es posible que se sienta lovett al mirar la timo de rodriguez pequeño bebé. También podría sentirse abrumada por rodriguez nuevo ritmo de sueño y las nuevas responsabilidades. Al principio, los bebés suelen dormir vanessa el día y permanecen despiertos vanessa la noche. No tienen ningún patrón ni rutina. Podrían mundo gritos ahogados, sacudirse y despertarse, o parecer kathryn si tuvieran los ojos cruzados (bizcos). Todo esto es normal, e incluso la pueden hacer sonreír. Vanessa las primeras semanas siguientes al parto, trate de cuidarse rolf. Podría tardar de 4 a 6 semanas en volver a sentirse usted misma, y posiblemente más tiempo si le mon hecho sima cesárea. Es probable que se sienta muy fatigada vanessa varias semanas. Anne Marie días estarán llenos de [place], parag también de mucha alegría. La atención de seguimiento es sima parte clave de rodriguez tratamiento y seguridad. Asegúrese de hacer y acudir a todas las citas, y llame a rodriguez médico si está teniendo problemas.  También es Solomon dickinson idea saber los [employer] exámenes y mantener sima lista de los medicamentos que navin. ¿Cómo puede cuidarse en el hogar? Cuide oakley cuerpo después del parto  · Utilice toallas sanitarias en vez de tampones para las pérdidas de stacey que podrían durar hasta 2 semanas. · Alivie los cólicos con ibuprofeno (Advil, Motrin). · Alivie el dolor de las hemorroides y la pieter entre la vagina y el recto con compresas de hielo o de infusión de hamamelis Lisha Gaitca (\"witch hazel\"). · Alivie el estreñimiento bebiendo abundante líquido y comiendo alimentos ricos en fibra. Pregúntele a oakley médico acerca de los ablandadores de heces de [place]. · Límpiese con un chorrito suave de agua tibia de sima botella en vez de hacerlo con papel higiénico.  · [place] un baño de asiento en agua tibia varias veces al día. · Use un buen sostén de lactancia. Alivie el dolor y la hinchazón de los senos con toallitas de aseo húmedas tibias. · Si no está amamantando, utilice hielo en lugar de calor para aliviar el dolor de los senos. · Si está amamantando, oakley período menstrual podría no comenzar hasta después de varios meses. Es posible que [place], y más tiempo al principio, de kathryn lo hacía antes del [facility]. · Espere hasta que haya sanado (de 4 a 6 semanas) antes de volver a tener relaciones sexuales. Oakley médico le dirá cuándo puede tener relaciones sexuales. · Trate de no viajar con el bebé mine las primeras 5 o 6 semanas. Si hace un viaje marcus en automóvil, alfredito paradas frecuentes para caminar y estirarse. Evite el agotamiento  · Descanse todos los días. Trate de dormir la siesta cuando oakley bebé también lo hace. · Pídale a otro adulto que la acompañe por unos favio después del [place]. · Planifique el cuidado de los niños si tiene otros hijos. · Sea flexible para que pueda comer a horas fuera de lo común y dormir cuando lo necesite. Tanto usted kathryn oakley bebé están creando horarios nuevos.   · Planee pequeñas salidas para estar afuera de la casa. El cambio podría hacer que se sienta menos fatigada. · Pida ayuda para cocinar y [address] [facility] y las compras. Recuerde que rodriguez principal tarea consiste en cuidar a rodriguez bebé. Conozca la ayuda que puede recibir en mckenna de tener depresión posparto  · La depresión posparto es común mine las primeras 1 a 2 semanas siguientes al nacimiento. Podría llorar o sentirse bernice o irritable sin razón alguna. · Descanse cada vez que pueda hacerlo. Estar fatigada le dificulta manejar dominique emociones. · Salga a caminar con rodriguez bebé. · Hable con rodriguez deonte, dominique amigos y dominique familiares acerca de dominique sentimientos. · Si dominique síntomas rivera más de unas pocas semanas, o si se siente muy deprimida, pídale ayuda a rodriguez médico.  · La depresión posparto puede tratarse. Los grupos de apoyo y la asesoría psicológica pueden ser de [facility]. A veces, los medicamentos también pueden ayudar. Manténgase saludable  · Coma alimentos sanos para tener más energía, producir sima buena [place] materna y adelgazar las libras adicionales que engordó con el bebé. · Si amamanta, evite el alcohol y las drogas. No fume. Si dejó de fumar mine el embarazo, felicitaciones. · Inicie ejercicios diarios después de 4 a 6 semanas, parag descanse cuando se sienta fatigada. · Aprenda ejercicios para tonificar el abdomen. Braulio los ejercicios de [place] para recuperar la fuerza de los músculos pélvicos. Puede hacer los ejercicios de Keg[place] mientras está de pie o sentada. ¨ Apriete los mismos músculos que usted usaría para detener la [place]. El abdomen y los muslos no deben moverse. ¨ Manténgalos apretados mine 3 segundos y, luego, relájelos otros 3 segundos. ¨ Empiece con 3 segundos. [place], añada 1 seble cada semana hasta que sea capaz de apretar mine 10 segundos. ¨ Repita el ejercicio entre 10 y 13 veces cada sesión. Braulio zuleima o más sesiones cada día.   · Busque sima clase para nuevas madres y recién nacidos que tenga un tiempo de ejercicio. · Si le mon hecho sima cesárea, dese un poco más de tiempo antes de hacer ejercicio, y tenga cuidado. ¿Cuándo debe pedir ayuda? Llame al 911 en cualquier momento que considere que necesita atención de [place]. Por ejemplo, llame si:  · Se desmayó (perdió el conocimiento). Llame a rodriguez médico ahora mismo o busque atención médica inmediata si:  · Tiene sangrado vaginal intenso. [place] significa que está expulsando coágulos sanguíneos y empapando sima toalla sanitaria cada hora por 2 horas o más. · Está mareada o aturdida, o siente kathryn que se puede [id number] · Tiene fiebre. · Tiene dolor abdominal nuevo o que empeora. Preste especial atención a los cambios en rodriguez ladan y asegúrese de comunicarse con rodriguez médico si:  · Rodriguez sangrado vaginal parece volverse más intenso. · Tiene flujo vaginal nuevo o que empeora. · Se siente bernice, ansiosa o sin esperanzas mine más de unos pocos días. · No mejora kathryn se esperaba. ¿Dónde puede encontrar más información en inglés? Angelo Constable a http://greg-kelvin.info/. Destiny Maple D448 en la búsqueda para aprender más acerca de \"Después del parto (período de posparto): Instrucciones de cuidado - [ After Your Delivery (the Postpartum Period): Care Instructions ]. \"  Revisado: 30 cruz, 2016  Versión del contenido: 11.2  © 3291-2712 [employer]. Las instrucciones de cuidado fueron adaptadas bajo licencia por Good Help Connections (which disclaims liability or warranty for this information). Si usted tiene [place] afección médica o sobre estas instrucciones, siempre pregunte a rodriguez profesional de ladan. Health system, Incorporated niega toda garantía o responsabilidad por rodriguez uso de esta información.

## 2017-04-14 NOTE — MR AVS SNAPSHOT
Visit Information Nilsa Alvarez y Lokimil Personal Médico Departamento Teléfono del Dep. Número de visita 4/14/2017 11:15 AM Marilia Diaz, [address] [facility] [phone] Follow-up Instructions Return if symptoms worsen or fail to improve. Upcoming Health Maintenance Date Due  
 PAP AKA CERVICAL CYTOLOGY 3/31/2020 DTaP/Tdap/Td series (2 - Td) 12/7/2026 Alergias  Review Complete El: 4/14/2017 Por: Govind Rizvi LPN A partir del:  4/14/2017 No Known Allergies Vacunas actuales Revisadas el:  12/7/2016 Peggy Billie Influenza Vaccine (Quad) PF 12/7/2016, 12/26/2014 Tdap 12/7/2016, 12/26/2014 No revisadas esta visita You Were Diagnosed With   
  
 Clayborne Reilly Postpartum care following vaginal delivery    -  Primary ICD-10-CM: Z39.2 ICD-9-CM: V24.2 Partes vitales PS Pulso Temperatura Resp [place] ( percentil de crecimiento) Peso (percentil de crecimiento) 115/78 62 98.4 °F (36.9 °C) (Oral) 16 5' 4\" (1.626 m) 185 lb (83.9 kg) SpO2 BMI (IMC) Estado obstétrico Estatus de tabaquísmo 97% 31.76 kg/m2 Recent pregnancy Never Smoker BMI and BSA Data Body Mass Index Body Surface Area 31.76 kg/m 2 1.95 m 2 [facility] Name Phone [phone] [place], [address] [phone] [place] lista de medicamentos actualizada Lista actualizada el: 4/14/17 11:41 AM.  Kamari Harrisr use oakley lista de medicamentos más reciente. docusate sodium 100 mg capsule También conocido kathryn:  Johnsie Masood Take 1 Cap by mouth two (2) times daily as needed for Constipation for up to 90 days. ibuprofen 800 mg tablet También conocido kathryn:  MOTRIN Take 1 Tab by mouth every eight (8) hours. PRENATAL PO Take  by mouth.  
  
 sodium chloride 0.65 % nasal spray También conocido kathryn:  SALINE NASAL 1 spray by Both Nostrils route as needed for Congestion. Instrucciones de seguimiento Return if symptoms worsen or fail to improve. Instrucciones para el Paciente [address] [place] [address], Devendra [facility] 
[phone] [facility] [address] · [phone] Aprenda sobre métodos anticonceptivos: El implante - [ Learning About Birth Control: The Implant ] Qué es el implante? El implante se [place] para prevenir el [facility]. Es sima varilla delgada del tamaño de un cerillo (fósforo) que se inserta bajo la piel (subcutánea) en la parte interna del brazo. El implante libera la hormona progestina para prevenir Joann Ruck. La progestina previene el [facility] de las siguientes formas: Espesa el moco del aga uterino. [place] hace que sea difícil que el esperma ingrese al [place]. También adelgaza el revestimiento del útero, lo que dificulta que un óvulo fertilizado se adhiera al [place]. La progestina puede en ocasiones evitar que los ovarios liberen un óvulo cada mes (ovulación). El implante previene el embarazo mine 3 años. Sima vez que es implantado, no tiene que hacer nada más para prevenir el embarazo. El implante solo puede ser insertado y retirado por rodriguez médico u otro profesional de la ladan capacitado. Estos procedimientos pueden hacerse en el consultorio de rodriguez médico y [address]  solo unos minutos. El médico adormece la pieter e \"inyecta\" el implante bajo la piel. No se hacen maguire en la piel. Para retirar el implante, el médico adormece la pieter, hace un pequeño gi en la piel y extrae el implante. Qué fam rolf funciona? El implante funciona [address] Menos de 1 moiz de cada 100 tiene un embarazo no planificado. Asegúrese de informarle al médico si tiene algún problema de ladan o sobre los medicamentos que navin. Él puede ayudarla a elegir el método anticonceptivo que sea adecuado para usted. Cuáles son Brianne Amato? · El implante es chavez de los métodos anticonceptivos más eficaces. · Previene el embarazo mine hasta 3 años. Usted no tiene que preocuparse de los métodos anticonceptivos mine [place]. · Es seguro de usar mientras se está amamantando. · El implante no contiene estrógeno. Por lo que puede usarlo si no quiere o no puede alejandro estrógeno debido a que tiene inquietudes o ciertos problemas de ladan. · Puede reducir el sangrado abundante y los cólicos. · Es práctico. Proporciona control de la natalidad constantemente y no es visible. No necesita recordar que debe alejandro sima pastilla ni ponerse sima inyección. No tiene que [employer] sexuales para protegerse del embarazo. Cuáles son Moisés Skinner? · El implante no protege contra las infecciones de transmisión sexual (STI, por dominique siglas en inglés), kathryn el herpes o el VIH/SIDA. Si no está sanchez de si rodriguez deonte sexual pudiera tener sima STI, utilice un condón para protegerse de [place] infección. · Puede causarle períodos menstruales irregulares, o podría tener manchas de [place] períodos. También puede dejar de tener un período. Algunas mujeres jo kathryn sima ventaja el no tener un período. · Puede causar Kaci Nicolas de ánimo, menos interés en el sexo o aumento de [place]. · Tiene que milagro a un médico para que le inserte o le quite un implante. Dónde puede encontrar más información en inglés? Akash Morris a http://greg-kelvin.info/. Escriba F276 en la búsqueda para aprender más acerca de \"Aprenda sobre métodos anticonceptivos: El implante - [ Learning About Birth Control: The Implant ]. \" 
Revisado: 30 mayo, 2016 Versión del contenido: 11.2 © 4662-3065 [employer], [employer]. Las instrucciones de cuidado fueron adaptadas bajo licencia por Good Help Connections (which disclaims liability or warranty for this information).  Si usted tiene preguntas sobre sima afección médica o sobre estas instrucciones, siempre pregunte a rodriguez profesional de ladan. [facility], Incorporated niega toda garantía o responsabilidad por rodriguez uso de esta información. El implante kathryn método anticonceptivo: Instrucciones de cuidado - [ Implant for Birth Control: Care Instructions ] Instrucciones de cuidado El implante se [place] para prevenir el Marlaine Later. Es sima varilla delgada del tamaño de un cerillo (fósforo) que se inserta bajo la piel (subdérmica) en la parte interna del brazo. El implante previene el embarazo mine 2 años. Después de que se coloca, no tiene que hacer [address] para prevenir el embarazo. La atención de seguimiento es sima parte clave de rodriguez tratamiento y seguridad. Asegúrese de hacer y acudir a todas las citas, y llame a rodriguez médico si está teniendo problemas. También es sima buena idea saber los resultados de los exámenes y mantener sima lista de los medicamentos que navin. Cómo puede cuidarse en el hogar? Cómo se [place] el implante subdérmico? 
· El implante es insertado y retirado por rodriguez médico u otro profesional de la ladan capacitado. [place] se hace en el consultorio de rodriguez [address] solo unos minutos. · Pregúntele a rodriguez médico si necesita usar un método anticonceptivo de respaldo, kathryn un condón. Y pregunte si (y por cuánto tiempo) debiera evitar tener relaciones sexuales después de la colocación del implante. Es posible que tenga que [place], dependiendo del momento de rodriguez ciclo en que le pongan el implante. Tally Canes necesita saber? · El implante tiene efectos secundarios. ¨ Es posible que tenga cambios en rodriguez período. Rodriguez período podría interrumpirse. También es posible que tenga manchado o sangrado entre períodos. ¨ Podría tener Naper Nicolas de ánimo, menos interés en el sexo o aumento de [place]. · Recuerde que es necesario que después de 3 años de la colocación del implante, se lo retiren o que le pongan chavez nuevo. ¨ Si no reemplaza el implante y no [address] anticonceptivo, [place]. ¨ Si le retiran el implante, tendrá que encontrar otro método anticonceptivo. Si no lo hace, podría quedar embarazada. ¨ Aun si está usted planeando quedar embarazada, tienen que retirarle el implante. · Consulte con rodriguez médico antes de usar cualquier otro medicamento. [place] incluye medicamentos de venta shai, vitaminas, productos herbarios y suplementos. Las hormonas anticonceptivas podrían no funcionar tan rolf para prevenir el embarazo cuando se combinan con otros medicamentos. · El implante no protege contra las infecciones de transmisión sexual (STI, por dominique siglas en inglés), kathryn el herpes o el VIH/SIDA. Si no está sanchez de si rodriguez deonte sexual pudiera tener sima STI, utilice un condón para protegerse de [place] infección. Cuándo debe pedir ayuda? Llame a rodriguez médico ahora mismo o busque atención médica inmediata si: · Tiene dolor abdominal intenso. Preste especial atención a los cambios en rodriguez ladan y asegúrese de comunicarse con rodriguez médico si: 
· Piensa que podría estar embarazada. · Tiene problemas con rodriguez método anticonceptivo. · Piensa que puede estar deprimida. · Tiene manchado con regularidad. · [address] a sima infección de transmisión sexual o ya tiene [place]. Dónde puede encontrar más información en inglés? Quique Jointer a http://kenyon.info/. Lobito Heman H303 en la búsqueda para aprender más acerca de \"El implante kathryn método anticonceptivo: Instrucciones de cuidado - [ Implant for Birth Control: Care Instructions ]. \" 
Revisado: 30 cruz, 2016 Versión del contenido: 11.2 © 5280-9917 [employer], ipadio. Las instrucciones de cuidado fueron adaptadas bajo licencia por Good Help Connections (which disclaims liability or warranty for this information).  Si usted tiene [place] afección médica o sobre estas instrucciones, siempre pregunte a rodriguez profesional de ladan. [facility] [employer] niega toda garantía o responsabilidad por rodriguez uso de esta información. Después del embarazo: Ejercicios - [ After Pregnancy: Exercises ] Instrucciones de cuidado Estos son algunos ejemplos de ejercicios que pueden ayudarle después del [facility]. Comience cada ejercicio lentamente. Reduzca la intensidad del ejercicio si Daron Amel a sentir dolor. Rodriguez médico o el fisioterapeuta le dirán cuándo puede comenzar con estos ejercicios y cuáles funcionarán mejor para usted. Cómo se hacen los ejercicios Metida del abdomen 1. Acuéstese sobre la espalda y [facility] dedos marco dentro de los huesos de la cadera de manera que pueda sentir los músculos de la parte baja del abdomen. 2. Respire profundamente. 3. A medida que gustavo salir el aire, jale el ombligo hacia la columna, kathryn si tratara de subir la cremallera de unos pantalones ajustados. Usted deberá sentir que los músculos de la parte baja del abdomen se alejan de dominique dedos a medida que los músculos se tensan. 4. Mantenga la posición por unos 6 segundos, parag no contenga la respiración. 5. Repita de 8 a 12 veces. 6. Repita raissa ejercicio varias veces al día y trate de [employer] músculos de la parte baja del abdomen adentro cada vez por más tiempo, conforme se João Schaffer fortaleciendo. 7. Practique raissa ejercicio mientras está de pie, kathryn cuando espera en sima skyler o cuando está sentada. Deslizamientos de talón 1. Acuéstese en el suelo con las rodillas flexionadas y [facility] dedos marco dentro de los huesos de la cadera de manera que pueda sentir los músculos de la parte baja del abdomen. Los pies deben estar apoyados sobre el suelo. 2. Hunda el ombligo hacia adentro y [place] la columna. Usted deberá sentir que los músculos de la parte baja del abdomen se alejan de dominique dedos a medida que los músculos se tensan.  
3. Siga manteniendo el ombligo adentro a medida que desliza un pie [place] el suelo hasta que la pierna esté estirada. 4. Vuelva a deslizar lentamente la pierna a la posición inicial mientras se asegura de mantener el ombligo adentro. 5. No arquee ni mueva la espalda mientras lo hace. No contenga la respiración. 6. Relájese y repita con la otra pierna. 7. Repita de 8 a 12 veces. Estiramiento de rodillas al [employer] 1. Acuéstese boca Lynnann Ferraris con sima rodilla doblada y la otra pierna estirada. 2. Sujete las caroline bajo la rodilla doblada y traiga la rodilla al pecho. Mantenga la parte baja de la espalda presionada contra el suelo. 3. Si mantener la pierna opuesta estirada le produce dolor en la espalda mientras se estira, doble también pietro rodilla y [address] el pie apoyado sobre el piso. 4. Mantenga esta posición por lo menos de 15 a 30 segundos. 5. Relájese y regrese la rodilla a la posición inicial. 
6. Repita el ejercicio con la otra pierna. 7. Repita de 2 a 4 veces con cada pierna. Rotación del aga 1. Siéntese en sima silla firme o póngase de pie derecha. 2. Con la barbilla nivelada, gire la pepe hacia la derecha y [place] 15 y 27 segundos. 3. Gire la pepe hacia la izquierda y mantenga la posición entre 15 y 27 segundos. 4. Repita de 2 a 4 veces hacia cada lado. Rotación de hombros 
 
1. Siéntese cómodamente y separe los pies a la distancia de los hombros. También puede hacer raissa ejercicio estando de pie. 2. [place] los hombros Iron, Zuniga Jaimie atrás y después hacia abajo en un Dirk Nose y circular. 3. Repita de 2 a 4 veces. Estiramiento de la parte media de la espalda Nota: Si usted tiene [employer] rodilla, no alfredito raissa ejercicio. 1. Arrodíllese en el piso y [employer] tobillos. 2. Inclínese hacia adelante, ponga las caroline sobre el piso y estire los brazos hacia el frente. Descanse la [employer]. 3. Empuje suavemente con el pecho [employer], llegando lo más lejos hacia el frente que pueda. 4. Mantenga la posición entre 15 y 27 segundos. 5. Repita de 2 a 4 veces. Estiramientos de la espalda 1. [address] y las rodillas en el piso. 2. Relaje la pepe y [facility]. Arquee la espalda hacia el techo, hasta que sienta que las partes deepthi, media y baja se estiran agradablemente. Mantenga raissa estiramiento mine el tiempo que se sienta cómoda, o de 15 a 30 segundos. 3. Vuelva a la posición inicial con la espalda plana mientras está apoyada de caroline y rodillas. 4. Deje que la espalda se nivele empujando el estómago hacia el piso. [address]. 5. Mantenga esta posición entre 15 y 27 segundos. 6. Repita de 2 a 4 veces. Estiramiento del tendón de la corva o isquiotibiales (HUITTINEN) 1. Acuéstese boca arriba con las piernas estiradas. Si siente molestia en la espalda, coloque sima toalla pequeña enrollada debajo de la parte baja de la espalda. 2. Sujetando la parte posterior de la pierna, levante la pierna estirada hacia arriba y BREST cuerpo hasta que sienta un estiramiento en la parte posterior del muslo. 3. Mantenga el estiramiento por lo menos mine 30 segundos. 4. Repita de 2 a 4 veces. 5. Cambie de pierna y repita los pasos del 1 al 4. Estiramiento de la pantorrilla 1. Póngase de pie frente a sima pared, con las caroline apoyadas en la pared a la altura de los ojos. Ponga la pierna aproximadamente un paso detrás de la otra. 2. Manteniendo recta la pierna de atrás y el talón de pietro pierna sobre el piso, flexione la rodilla de la pierna de adelante y lleve suavemente la cadera y el pecho hacia la pared hasta que sienta un estiramiento en la pantorrilla de la pierna de atrás. 3. Mantenga el estiramiento de 15 a 30 segundos. 4. Repita de 2 a 4 veces. 5. Repita los pasos del 1 al 4, parag esta vez mantenga doblada la rodilla de la pierna de atrás. 6. Cambie de pierna y repita los pasos del 1 al 5. La atención de seguimiento es sima parte clave de rodriguez tratamiento y seguridad. Asegúrese de hacer y acudir a todas las citas, y llame a rodriguez médico si está teniendo problemas. También es sima buena idea saber los resultados de los exámenes y mantener sima lista de los medicamentos que navin. Dónde puede encontrar más información en inglés? Angelo Jefferson a http://greg-kelvin.info/. Rafa Mendosa en la búsqueda para aprender más acerca de \"Después del embarazo: Ejercicios - [ After Pregnancy: Exercises ]. \" 
Revisado: 30 cruz, 2016 Versión del contenido: 11.2 © 9638-2963 Healthwise, Incorporated. Las instrucciones de cuidado fueron adaptadas bajo licencia por Good Help Connections (which disclaims liability or warranty for this information). Si usted tiene [place] afección médica o sobre estas instrucciones, siempre pregunte a rodriguez profesional de ladan. Healthwise, Incorporated niega toda garantía o responsabilidad por rodriguez uso de esta información. Después del parto (período de posparto): Instrucciones de cuidado - [ After Your Delivery (the Postpartum Period): Care Instructions ] Instrucciones de cuidado Felicidades por el nacimiento de rodriguez bebé. Al igual que el Juliana, el tiempo con el recién nacido puede ser un momento de [place], Katheleen Leyland y agotamiento. Es posible que se sienta lovett al mirar la timo de rodriguez pequeño bebé. También podría sentirse abrumada por rodriguez nuevo ritmo de sueño y las nuevas responsabilidades. Al principio, los bebés suelen dormir mine el día y permanecen despiertos mine la noche. No tienen ningún patrón ni rutina. Podrían mundo gritos ahogados, sacudirse y despertarse, o parecer kathryn si tuvieran los ojos cruzados (bizcos). Todo esto es normal, e incluso la pueden hacer sonreír. Mine las primeras semanas siguientes al parto, trate de cuidarse rolf.  Podría tardar de 4 a 6 semanas en volver a sentirse usted misma, y posiblemente más tiempo si le mon hecho sima cesárea. Es probable que se sienta muy fatigada mine varias semanas. Anne Marie días estarán llenos de [place], parag también de mucha alegría. La atención de seguimiento es sima parte clave de rodriguez tratamiento y seguridad. Asegúrese de hacer y acudir a todas las citas, y llame a rodriguez médico si está teniendo problemas. También es sima buena idea saber los resultados de los exámenes y mantener sima lista de los medicamentos que navin. Cómo puede cuidarse en el hogar? Cuide rodriguez cuerpo después del parto · Utilice toallas sanitarias en vez de tampones para las pérdidas de stacey que podrían durar hasta 2 semanas. · Alivie los cólicos con ibuprofeno (Advil, Motrin). · Alivie el dolor de las hemorroides y la pieter entre la vagina y el recto con compresas de hielo o de infusión de hamamelis Cherylynn Fus (\"witch hazel\"). · Alivie el estreñimiento bebiendo abundante líquido y comiendo alimentos ricos en fibra. Pregúntele a rodriguez médico acerca de los ablandadores de heces de [facility]. · Límpiese con un chorrito suave de agua tibia de sima botella en vez de hacerlo con papel higiénico. 
· [place] un baño de asiento en agua tibia varias veces al día. · Use un buen sostén de lactancia. Alivie el dolor y la hinchazón de los senos con toallitas de aseo húmedas tibias. · Si no está amamantando, utilice hielo en lugar de calor para aliviar el dolor de los senos. · Si está amamantando, rodriguez período menstrual podría no comenzar hasta después de varios meses. Es posible que [place], y más tiempo al principio, de kathryn lo hacía antes del Juliana. · Espere hasta que haya sanado (de 4 a 6 semanas) antes de volver a tener relaciones sexuales. Rodriguez médico le dirá cuándo puede tener relaciones sexuales. · Trate de no viajar con el bebé mine las primeras 5 o 6 semanas. Si hace un viaje marcus en automóvil, alfredito paradas frecuentes para caminar y estirarse. Evite el agotamiento · Descanse todos los días. Trate de dormir la siesta cuando rodriguez bebé también lo hace. · Pídale a otro adulto que la acompañe por unos favio después del [place]. · Planifique el cuidado de los niños si tiene otros hijos. · Sea flexible para que pueda comer a horas fuera de lo común y dormir cuando lo necesite. Tanto usted kathryn rodriguez bebé están creando horarios nuevos. · Planee pequeñas salidas para estar afuera de la casa. El cambio podría hacer que se sienta menos fatigada. · Pida ayuda para cocinar y [address] hogar y las compras. Recuerde que rodriguez principal tarea consiste en cuidar a rodriguez bebé. Conozca la ayuda que puede recibir en mckenna de tener depresión posparto · La depresión posparto es común mine las primeras 1 a 2 semanas siguientes al nacimiento. Podría llorar o sentirse bernice o irritable sin razón alguna. · Descanse cada vez que pueda hacerlo. Estar fatigada le dificulta manejar dominique emociones. · Salga a caminar con rodriguez bebé. · Hable con rodriguez deonte, dominique amigos y dominique familiares acerca de dominique sentimientos. · Si dominique síntomas rivera más de unas pocas semanas, o si se siente muy deprimida, pídale ayuda a rodriguez médico. 
· La depresión posparto puede tratarse. Los grupos de apoyo y la asesoría psicológica pueden ser de Osaes Aleman. A veces, los medicamentos también pueden ayudar. Manténgase saludable · Coma alimentos sanos para tener más energía, producir sima buena [place] materna y adelgazar las libras adicionales que engordó con el bebé. · Si amamanta, evite el alcohol y las drogas. No fume. Si dejó de fumar mine el embarazo, felicitaciones. · Inicie ejercicios diarios después de 4 a 6 semanas, parag descanse cuando se sienta fatigada. · Aprenda ejercicios para tonificar el abdomen. Braulio los ejercicios de [place] para recuperar la fuerza de los músculos pélvicos. Puede hacer los ejercicios de Keg[place] mientras está de pie o sentada. ¨ Apriete los mismos músculos que usted usaría para detener la [place].  El abdomen y los muslos no deben moverse. ¨ Manténgalos apretados mine 3 segundos y, luego, relájelos otros 3 segundos. ¨ Empiece con 3 segundos. [place], añada 1 seble cada semana hasta que sea capaz de apretar mine 10 segundos. ¨ Repita el ejercicio entre 10 y 13 veces cada sesión. Braulio zuleima o más sesiones cada día. · Busque sima clase para nuevas madres y recién nacidos que tenga un tiempo de ejercicio. · Si le mon hecho sima cesárea, dese un poco más de tiempo antes de hacer ejercicio, y tenga cuidado. Cuándo debe pedir ayuda? Llame al 911 en cualquier momento que considere que necesita atención de [place]. Por ejemplo, llame si: 
· Se desmayó (perdió el conocimiento). Llame a oakley médico ahora mismo o busque atención médica inmediata si: · Tiene sangrado vaginal intenso. [place] significa que está expulsando coágulos sanguíneos y empapando sima toalla sanitaria cada hora por 2 horas o más. · Está mareada o aturdida, o siente kathryn que se puede [address] · Tiene fiebre. · Tiene dolor abdominal nuevo o que empeora. Preste especial atención a los cambios en oakley ladan y asegúrese de comunicarse con oakley médico si: 
· [place] sangrado vaginal parece volverse más intenso. · Tiene flujo vaginal nuevo o que empeora. · Se siente bernice, ansiosa o sin esperanzas mine más de unos pocos días. · No mejora kathryn se esperaba. Dónde puede encontrar más información en inglés? Elsa Baldwin a http://kenyon.info/. Stephanie Kaur B612 en la búsqueda para aprender más acerca de \"Después del parto (período de posparto): Instrucciones de cuidado - [ After Your Delivery (the Postpartum Period): Care Instructions ]. \" 
Revisado: 30 mayo, 2016 Versión del contenido: 11.2 © 0701-0394 [email or web address], [employer]. Las instrucciones de cuidado fueron adaptadas bajo licencia por Good Help Connections (which disclaims liability or warranty for this information).  Si usted tiene preguntas sobre sima afección médica o sobre estas instrucciones, siempre pregunte a rodriguez profesional de ladan. [facility], Incorporated niega toda garantía o responsabilidad por rodriguez uso de esta información. Introducing [facility] SERVICES! [facility] introduce portal paciente MyChart . Ahora se puede acceder a partes de rodriguez expediente médico, enviar por correo electrónico la oficina de rodriguez médico y solicitar renovaciones de medicamentos en línea. En rodriguez navegador de Internet , Justin Trujillo a [email or web address] Alfredito clic en el usuario por Monica Kimble? Rafael Punt clic aquí en la sesión [facility]. Verá la página de registro [place]. Ingrese rodriguez código de [facility] manuel y kathryn aparece a continuación. Usted no tendrá que [employer] código después de maryuri completado el proceso de registro . Si usted no se inscribe antes de la fecha de caducidad , debe solicitar un nuevo código. · MyChart Código de acceso : [id number] Expires: 4/20/2017 10:48 AM 
 
Ingresa los últimos cuatro dígitos de rodriguez Número de Seguro Social ( xxxx ) y fecha de nacimiento ( dd / mm / aaaa ) kathryn se indica y alfredito clic en Enviar. Usted será llevado a la siguiente página de registro . Crear un ID MyChart . Esta será rodriguez ID de inicio de sesión de MyChart y no puede ser [place] , por lo que pensar en sima que es Supriya Agustin y fácil de recordar . Crear sima contraseña MyChart . Usted puede cambiar rodriguez contraseña en cualquier momento . Ingrese rodriguez Password Reset de preguntas y Vogel . Eleva se puede utilizar en un momento posterior si usted olvida rodriguez contraseña. Introduzca rodriguez dirección de correo electrónico . Chantal Denier recibirá sima notificación por correo electrónico cuando la nueva información está disponible en MyChart . Jari Kayser clic en Registrarse. Doni Alonzo milagro y descargar porciones de rodriguez expediente médico. 
Alfredito deja en el enlace de descarga del menú Resumen para descargar sima copia portátil de rodriguez información médica . Si tiene [employer] , por favor visite la sección de preguntas frecuentes del sitio web MyChart . Recuerde, MyChart NO es que se utilizará para las necesidades urgentes. Para emergencias médicas , llame al 911 . Ahora disponible en rodriguez iPhone y Android ! Por favor proporcione raissa resumen de la documentación de cuidado a rodriguez próximo proveedor. Your primary care clinician is listed as Jenny Hugo. If you have any questions after today's visit, please call [phone].

## 2017-04-14 NOTE — PROGRESS NOTES
Subjective:   Ms. Nolan Schwarz is a 27 y.o. who is now 6 weeks postpartum. OB History      Para Term  AB TAB SAB Ectopic Multiple Living    4 4 4  0    0 2        Obstetric Comments    First child -  after 1 day. Baby didn't have kidneys, heart problem. 6 toes, 6 fingers, 40 weeks gestation. Was told genetic, but no testing. Same father of baby. Never tested either. Method of delivery: normal spontaneous vaginal delivery  She is breast-feeding and is not experiencing problems. Pregnancy complications: none. She is feeling well, happy and that she has no suicidal/homicidal inclination. She currently uses abstinence for contraception. She plans to use condoms for contraception. Andreas depression screen:  Score of 3. Patient Active Problem List   Diagnosis Code    Supervision of other high risk pregnancies, second trimester O09.892    History of herpes genitalis Z86.19    Supervision of other normal pregnancy, antepartum Z34.80    Positive GBS test B95.1    Pelvic pain affecting pregnancy in third trimester, antepartum O26.893, R10.2     Patient Active Problem List    Diagnosis Date Noted    Pelvic pain affecting pregnancy in third trimester, antepartum 2017    Positive GBS test 02/15/2017    Supervision of other normal pregnancy, antepartum 2016    Supervision of other high risk pregnancies, second trimester 10/10/2014    History of herpes genitalis 10/10/2014     Current Outpatient Prescriptions   Medication Sig Dispense Refill    ibuprofen (MOTRIN) 800 mg tablet Take 1 Tab by mouth every eight (8) hours. 30 Tab 0    docusate sodium (COLACE) 100 mg capsule Take 1 Cap by mouth two (2) times daily as needed for Constipation for up to 90 days. 30 Cap 0    sodium chloride (SALINE NASAL) 0.65 % nasal spray 1 spray by Both Nostrils route as needed for Congestion. 30 mL 0    PRENATAL VIT/IRON FUMARATE/FA (PRENATAL PO) Take  by mouth.        No Known Allergies  Past Medical History:   Diagnosis Date    Anemia     History of herpes genitalis      death     First child -  after 1 day. Baby didn't have kidneys, heart problem. 6 toes, 6 fingers, 40 weeks gestation. Was told genetic, but no testing. Same father of baby. Never tested either. FOB two nephews with autism. History reviewed. No pertinent surgical history. Social History   Substance Use Topics    Smoking status: Never Smoker    Smokeless tobacco: Never Used    Alcohol use No        Objective:   Physical Exam:  Date of last Pap smear: 3/31/17 NILM HPV neg  Physical Exam: GENERAL APPEARANCE: alert, well appearing, in no apparent distress  THYROID: no thyromegaly or masses present  LUNGS: clear to auscultation, no wheezes, rales or rhonchi, symmetric air entry  HEART: regular rate and rhythm, no murmurs  ABDOMEN POSTPARTUM: benign non-tender, without masses or organomegaly palpable  EXTREMITIES: no redness or tenderness in the calves or thighs, no edema    Assessment/Plan:   normal postpartum exam  Performed pelvic examination 2 wks ago. With normal examination. Incision well healed. Scant bleeding. Ultrasound performed and was negative for acute changes. Bleeding has improved. See orders and Patient Instructions  Contraceptive counseling for implanon  Resume all normal activities     ICD-10-CM ICD-9-CM    1.  Postpartum care following vaginal delivery Z39.2 V24.2

## 2021-04-20 ENCOUNTER — HOSPITAL ENCOUNTER (OUTPATIENT)
Dept: LAB | Age: 36
Discharge: HOME | End: 2021-04-20

## 2021-04-20 PROCEDURE — 83036 HEMOGLOBIN GLYCOSYLATED A1C: CPT

## 2021-04-20 PROCEDURE — 84443 ASSAY THYROID STIM HORMONE: CPT

## 2021-04-20 PROCEDURE — 80053 COMPREHEN METABOLIC PANEL: CPT

## 2021-04-20 PROCEDURE — 85025 COMPLETE CBC W/AUTO DIFF WBC: CPT

## 2021-04-20 PROCEDURE — 80061 LIPID PANEL: CPT

## 2021-04-20 PROCEDURE — 87389 HIV-1 AG W/HIV-1&-2 AB AG IA: CPT

## 2021-04-21 LAB
ALBUMIN SERPL-MCNC: 4 G/DL (ref 3.5–5)
ALBUMIN/GLOB SERPL: 1 {RATIO} (ref 1.1–2.2)
ALP SERPL-CCNC: 75 U/L (ref 45–117)
ALT SERPL-CCNC: 34 U/L (ref 12–78)
ANION GAP SERPL CALC-SCNC: 9 MMOL/L (ref 5–15)
AST SERPL-CCNC: 20 U/L (ref 15–37)
BASOPHILS # BLD: 0 K/UL (ref 0–0.1)
BASOPHILS NFR BLD: 0 % (ref 0–1)
BILIRUB SERPL-MCNC: 0.6 MG/DL (ref 0.2–1)
BUN SERPL-MCNC: 12 MG/DL (ref 6–20)
BUN/CREAT SERPL: 21 (ref 12–20)
CALCIUM SERPL-MCNC: 9.4 MG/DL (ref 8.5–10.1)
CHLORIDE SERPL-SCNC: 106 MMOL/L (ref 97–108)
CHOLEST SERPL-MCNC: 165 MG/DL
CO2 SERPL-SCNC: 26 MMOL/L (ref 21–32)
CREAT SERPL-MCNC: 0.58 MG/DL (ref 0.55–1.02)
DIFFERENTIAL METHOD BLD: ABNORMAL
EOSINOPHIL # BLD: 0 K/UL (ref 0–0.4)
EOSINOPHIL NFR BLD: 1 % (ref 0–7)
ERYTHROCYTE [DISTWIDTH] IN BLOOD BY AUTOMATED COUNT: 12.6 % (ref 11.5–14.5)
EST. AVERAGE GLUCOSE BLD GHB EST-MCNC: 117 MG/DL
GLOBULIN SER CALC-MCNC: 4 G/DL (ref 2–4)
GLUCOSE SERPL-MCNC: 98 MG/DL (ref 65–100)
HBA1C MFR BLD: 5.7 % (ref 4–5.6)
HCT VFR BLD AUTO: 41.4 % (ref 35–47)
HDLC SERPL-MCNC: 76 MG/DL
HDLC SERPL: 2.2 {RATIO} (ref 0–5)
HGB BLD-MCNC: 13 G/DL (ref 11.5–16)
HIV 1+2 AB+HIV1 P24 AG SERPL QL IA: NONREACTIVE
HIV12 RESULT COMMENT, HHIVC: NORMAL
IMM GRANULOCYTES # BLD AUTO: 0 K/UL (ref 0–0.04)
IMM GRANULOCYTES NFR BLD AUTO: 0 % (ref 0–0.5)
LDLC SERPL CALC-MCNC: 72 MG/DL (ref 0–100)
LIPID PROFILE,FLP: NORMAL
LYMPHOCYTES # BLD: 1.4 K/UL (ref 0.8–3.5)
LYMPHOCYTES NFR BLD: 22 % (ref 12–49)
MCH RBC QN AUTO: 29.7 PG (ref 26–34)
MCHC RBC AUTO-ENTMCNC: 31.4 G/DL (ref 30–36.5)
MCV RBC AUTO: 94.7 FL (ref 80–99)
MONOCYTES # BLD: 0.2 K/UL (ref 0–1)
MONOCYTES NFR BLD: 4 % (ref 5–13)
NEUTS SEG # BLD: 4.6 K/UL (ref 1.8–8)
NEUTS SEG NFR BLD: 73 % (ref 32–75)
NRBC # BLD: 0 K/UL (ref 0–0.01)
NRBC BLD-RTO: 0 PER 100 WBC
PLATELET # BLD AUTO: 247 K/UL (ref 150–400)
PMV BLD AUTO: 11.6 FL (ref 8.9–12.9)
POTASSIUM SERPL-SCNC: 4 MMOL/L (ref 3.5–5.1)
PROT SERPL-MCNC: 8 G/DL (ref 6.4–8.2)
RBC # BLD AUTO: 4.37 M/UL (ref 3.8–5.2)
SODIUM SERPL-SCNC: 141 MMOL/L (ref 136–145)
TRIGL SERPL-MCNC: 85 MG/DL (ref ?–150)
TSH SERPL DL<=0.05 MIU/L-ACNC: 1.58 UIU/ML (ref 0.36–3.74)
VLDLC SERPL CALC-MCNC: 17 MG/DL
WBC # BLD AUTO: 6.2 K/UL (ref 3.6–11)

## 2021-05-10 ENCOUNTER — HOSPITAL ENCOUNTER (OUTPATIENT)
Dept: LAB | Age: 36
Discharge: HOME | End: 2021-05-10

## 2021-05-10 PROCEDURE — 87491 CHLMYD TRACH DNA AMP PROBE: CPT

## 2021-05-17 ENCOUNTER — HOSPITAL ENCOUNTER (OUTPATIENT)
Dept: LAB | Age: 36
Discharge: HOME | End: 2021-05-17

## 2021-05-24 PROCEDURE — 84460 ALANINE AMINO (ALT) (SGPT): CPT

## 2021-05-24 PROCEDURE — 86592 SYPHILIS TEST NON-TREP QUAL: CPT

## 2021-05-24 PROCEDURE — 86803 HEPATITIS C AB TEST: CPT

## 2021-05-24 PROCEDURE — 87389 HIV-1 AG W/HIV-1&-2 AB AG IA: CPT

## 2021-05-24 PROCEDURE — 87340 HEPATITIS B SURFACE AG IA: CPT

## 2021-05-24 PROCEDURE — 86706 HEP B SURFACE ANTIBODY: CPT

## 2021-05-25 ENCOUNTER — HOSPITAL ENCOUNTER (OUTPATIENT)
Dept: LAB | Age: 36
Discharge: HOME | End: 2021-05-25

## 2021-05-25 LAB
ALT SERPL-CCNC: 30 U/L (ref 12–78)
HBV SURFACE AB SER QL: NONREACTIVE
HBV SURFACE AB SER-ACNC: <3.1 MIU/ML
HBV SURFACE AG SER QL: <0.1 INDEX
HBV SURFACE AG SER QL: NEGATIVE
HCV AB SERPL QL IA: NONREACTIVE
HCV COMMENT,HCGAC: NORMAL
HIV 1+2 AB+HIV1 P24 AG SERPL QL IA: NONREACTIVE
HIV12 RESULT COMMENT, HHIVC: NORMAL
RPR SER QL: NONREACTIVE

## 2021-07-22 ENCOUNTER — HOSPITAL ENCOUNTER (OUTPATIENT)
Dept: LAB | Age: 36
Discharge: HOME | End: 2021-07-22

## 2021-07-22 PROCEDURE — 36415 COLL VENOUS BLD VENIPUNCTURE: CPT

## 2021-07-22 PROCEDURE — 87491 CHLMYD TRACH DNA AMP PROBE: CPT

## 2021-07-29 LAB
Lab: NORMAL
REFERENCE LAB,REFLB: NORMAL
TEST DESCRIPTION:,ATST: NORMAL

## 2022-05-05 ENCOUNTER — HOSPITAL ENCOUNTER (OUTPATIENT)
Dept: LAB | Age: 37
Discharge: HOME | End: 2022-05-05

## 2022-05-05 ENCOUNTER — OFFICE VISIT (OUTPATIENT)
Dept: FAMILY MEDICINE CLINIC | Age: 37
End: 2022-05-05

## 2022-05-05 VITALS
BODY MASS INDEX: 29.32 KG/M2 | SYSTOLIC BLOOD PRESSURE: 137 MMHG | OXYGEN SATURATION: 99 % | HEIGHT: 65 IN | HEART RATE: 96 BPM | TEMPERATURE: 97.7 F | WEIGHT: 176 LBS | DIASTOLIC BLOOD PRESSURE: 74 MMHG

## 2022-05-05 DIAGNOSIS — Z00.00 ENCOUNTER FOR WELL ADULT EXAM WITHOUT ABNORMAL FINDINGS: ICD-10-CM

## 2022-05-05 DIAGNOSIS — R30.0 DYSURIA: ICD-10-CM

## 2022-05-05 DIAGNOSIS — R10.2 LEFT ADNEXAL TENDERNESS: Primary | ICD-10-CM

## 2022-05-05 LAB
BILIRUB UR QL STRIP: NEGATIVE
GLUCOSE UR-MCNC: NEGATIVE MG/DL
HCG URINE, QL. (POC): NEGATIVE
KETONES P FAST UR STRIP-MCNC: NEGATIVE MG/DL
PH UR STRIP: 8.5 [PH] (ref 4.6–8)
PROT UR QL STRIP: NEGATIVE
SP GR UR STRIP: 1 (ref 1–1.03)
UA UROBILINOGEN AMB POC: ABNORMAL (ref 0.2–1)
URINALYSIS CLARITY POC: CLEAR
URINALYSIS COLOR POC: YELLOW
URINE BLOOD POC: NEGATIVE
URINE LEUKOCYTES POC: ABNORMAL
URINE NITRITES POC: NEGATIVE
VALID INTERNAL CONTROL?: YES

## 2022-05-05 PROCEDURE — 87186 SC STD MICRODIL/AGAR DIL: CPT

## 2022-05-05 PROCEDURE — 81003 URINALYSIS AUTO W/O SCOPE: CPT | Performed by: NURSE PRACTITIONER

## 2022-05-05 PROCEDURE — 87086 URINE CULTURE/COLONY COUNT: CPT

## 2022-05-05 PROCEDURE — 81025 URINE PREGNANCY TEST: CPT | Performed by: NURSE PRACTITIONER

## 2022-05-05 PROCEDURE — 87077 CULTURE AEROBIC IDENTIFY: CPT

## 2022-05-05 PROCEDURE — 99214 OFFICE O/P EST MOD 30 MIN: CPT | Performed by: NURSE PRACTITIONER

## 2022-05-05 RX ORDER — IBUPROFEN 800 MG/1
800 TABLET ORAL 2 TIMES DAILY WITH MEALS
Qty: 30 TABLET | Refills: 0 | Status: SHIPPED | OUTPATIENT
Start: 2022-05-05

## 2022-05-05 NOTE — PROGRESS NOTES
Results for orders placed or performed in visit on 05/05/22   AMB POC URINE PREGNANCY TEST, VISUAL COLOR COMPARISON   Result Value Ref Range    VALID INTERNAL CONTROL POC Yes     HCG urine, Ql. (POC) Negative Negative   AMB POC URINALYSIS DIP STICK AUTO W/O MICRO   Result Value Ref Range    Color (UA POC) Yellow     Clarity (UA POC) Clear     Glucose (UA POC) Negative Negative    Bilirubin (UA POC) Negative Negative    Ketones (UA POC) Negative Negative    Specific gravity (UA POC) 1.000 (A) 1.001 - 1.035    Blood (UA POC) Negative Negative    pH (UA POC) 8.5 (A) 4.6 - 8.0    Protein (UA POC) Negative Negative    Urobilinogen (UA POC) 0.2 mg/dL 0.2 - 1    Nitrites (UA POC) Negative Negative    Leukocyte esterase (UA POC) 3+ Negative

## 2022-05-05 NOTE — PROGRESS NOTES
I have printed AVS and reviewed it with patient today. Patient verbalized understanding. I reviewed with patient medications sent to pharmacy and how the medication is taken. Patient verbalized understanding. The patient was texted [employer] coupons for prescriptions and I explained how the coupons are used. Patient verbalized understanding. I assisted the patient with scheduling the ordered ultra sounds. Explained procedure for obtaining ultra sounds and instructed  pt to go to Outpatient Registration. I provided address and directions to facility and informed the patient that someone will call them after we get results. Patient verbalized understanding. Patient correctly stated her full name and date of birth prior to the information shared.  Jonathon Tinajero with the [place] [id number] assisted with this visit.  Tierra Liz RN

## 2022-05-08 LAB
BACTERIA SPEC CULT: ABNORMAL
CC UR VC: ABNORMAL
SERVICE CMNT-IMP: ABNORMAL

## 2022-05-20 ENCOUNTER — HOSPITAL ENCOUNTER (OUTPATIENT)
Dept: ULTRASOUND IMAGING | Age: 37
Discharge: HOME | End: 2022-05-20
Attending: NURSE PRACTITIONER

## 2022-05-20 DIAGNOSIS — R10.2 LEFT ADNEXAL TENDERNESS: ICD-10-CM

## 2022-05-20 PROCEDURE — 76830 TRANSVAGINAL US NON-OB: CPT

## 2022-05-20 PROCEDURE — 76856 US EXAM PELVIC COMPLETE: CPT

## 2022-05-27 NOTE — PROGRESS NOTES
Normal transvaginal and transabdominal ultrasounds. Centrally located IUD. Incidentally noted retroverted uterus. Has follow up to discuss.

## 2022-06-16 ENCOUNTER — TELEPHONE (OUTPATIENT)
Dept: FAMILY MEDICINE CLINIC | Age: 37
End: 2022-06-16

## 2022-06-16 ENCOUNTER — OFFICE VISIT (OUTPATIENT)
Dept: FAMILY MEDICINE CLINIC | Age: 37
End: 2022-06-16

## 2022-06-16 VITALS
BODY MASS INDEX: 29.88 KG/M2 | HEART RATE: 82 BPM | HEIGHT: 64 IN | OXYGEN SATURATION: 99 % | DIASTOLIC BLOOD PRESSURE: 70 MMHG | WEIGHT: 175 LBS | TEMPERATURE: 98.5 F | SYSTOLIC BLOOD PRESSURE: 126 MMHG

## 2022-06-16 DIAGNOSIS — G43.829 MENSTRUAL MIGRAINE WITHOUT STATUS MIGRAINOSUS, NOT INTRACTABLE: Primary | ICD-10-CM

## 2022-06-16 PROCEDURE — 99214 OFFICE O/P EST MOD 30 MIN: CPT | Performed by: NURSE PRACTITIONER

## 2022-06-16 NOTE — PROGRESS NOTES
Coordination of Care  1. Have you been to the ER, urgent care clinic since your last visit? Hospitalized since your last visit? No    2. Have you seen or consulted any other health care providers outside of the [address] [place] [address] since your last visit? Include any pap smears or colon screening. No    Does the patient need refills? YES    Learning Assessment Complete?  yes  Depression Screening complete in the past 12 months? yes

## 2022-06-16 NOTE — TELEPHONE ENCOUNTER
OW called patient to assist with FA. Financial screening started for Care Card. An appt was made for 6/24/22 at 9:30am. Pending POI, Bank statement and bills. Patient [facility] also [facility]. [facility] told the patient that she can only be a patient in one free clinic, not two free clinics simultaneously. Patient verbalized understanding. Staff message sent to [employer].

## 2022-06-16 NOTE — PROGRESS NOTES
Maryam Craig seen at d/c, full name and  verified, given After visit Summary and reviewed today's visit with patient along with instructions on when it is recommended to come back. I have reviewed the provider's instructions with the patient, answering all questions to her satisfaction. Patient verbalized understanding.   Sheng Mcfarlane RN

## 2022-06-16 NOTE — PROGRESS NOTES
2022 : Fred Aaron (: 1985) is a 39 y.o. female, established patient, here for evaluation of the following chief complaint(s):  Abnormal Lab Results (and ultrasound results f/up), Head Pain (Pt c/o left head pain on and off. Pt states she has Hx of migraine x 10 years), and Other (Pt needs to speak with OW for US bills)    ASSESSMENT/PLAN:  Below is the assessment and plan developed based on review of pertinent history, physical exam, labs, studies, and medications. 1. Menstrual migraine without status migrainosus, not intractable    Return for OW, bills; Dr Kleber Gr pap clinic.  -conitnue treatment once a month with current treatment (excedrin migraine x 3 days). If frequency of headaches increases, please return. SUBJECTIVE/OBJECTIVE:  HPI Normal transvaginal and transabdominal ultrasounds.  Centrally located IUD.  Incidentally noted retroverted uterus. Had IUD placed a year ago, good for 5 years. Head Pain  Left temple, lasts for 3 days. Had it last week. Took Excedrin Migraine. It doesn't go away. It is away now. Rated pain 10/10. Has regular periods. 3 years with menstrual migraines. Pain goes down to 5 and she just keeps doing her work at home. Patient's last menstrual period was 2022. Headaches happen around the time of the period. CN II-XII intact. One time she was going up stairs and she fell to the floor. She did not feel pain. She did not pass out. No results found for any visits on 22. Review of Systems: Negative for: fever, chest pain, shortness of breath, leg swelling. Social History:  reports that she has never smoked. She has never used smokeless tobacco. She reports current alcohol use. She reports that she does not use drugs. Current Medications:   Current Outpatient Medications   Medication Sig    ibuprofen (MOTRIN) 800 mg tablet Take 1 Tablet by mouth two (2) times daily (with meals).  As needed for pain    sodium chloride (SALINE NASAL) 0.65 % nasal spray 1 spray by Both Nostrils route as needed for Congestion. (Patient not taking: Reported on 5/5/2022)    PRENATAL VIT/IRON FUMARATE/FA (PRENATAL PO) Take  by mouth. (Patient not taking: Reported on 5/5/2022)     Physical Examination: Patient's last menstrual period was 06/07/2022. Blood pressure 126/70, pulse 82, temperature 98.5 °F (36.9 °C), temperature source Temporal, height 5' 4.45\" (1.637 m), weight 175 lb (79.4 kg), last menstrual period 06/07/2022, SpO2 99 %, currently breastfeeding. General appearance - well developed, no acute distress. Chest - clear to auscultation. Heart - regular rate and rhythm without murmurs, rubs, or gallops. Abdomen - bowel sounds present x 4, NT, ND  Extremities - no CCE. An electronic signature was used to authenticate this note.   -- Teri Cueto NP

## 2022-06-23 ENCOUNTER — TELEPHONE (OUTPATIENT)
Dept: FAMILY MEDICINE CLINIC | Age: 37
End: 2022-06-23

## 2022-06-23 NOTE — TELEPHONE ENCOUNTER
OW called patient to reschedule appt due to all staff training/meeting. A new appt has been scheduled for 7/1/22 at 9:30am. A new appt reminder was sent to patient.

## 2022-07-01 ENCOUNTER — OFFICE VISIT (OUTPATIENT)
Dept: FAMILY MEDICINE CLINIC | Age: 37
End: 2022-07-01

## 2022-07-01 VITALS
RESPIRATION RATE: 16 BRPM | SYSTOLIC BLOOD PRESSURE: 116 MMHG | TEMPERATURE: 98.4 F | BODY MASS INDEX: 29.78 KG/M2 | HEART RATE: 92 BPM | HEIGHT: 64 IN | OXYGEN SATURATION: 99 % | DIASTOLIC BLOOD PRESSURE: 76 MMHG | WEIGHT: 174.4 LBS

## 2022-07-01 DIAGNOSIS — R23.8 VESICLES: Primary | ICD-10-CM

## 2022-07-01 DIAGNOSIS — Z71.89 COUNSELING AND COORDINATION OF CARE: Primary | ICD-10-CM

## 2022-07-01 PROCEDURE — 99080 SPECIAL REPORTS OR FORMS: CPT | Performed by: PHYSICIAN ASSISTANT

## 2022-07-01 PROCEDURE — 99213 OFFICE O/P EST LOW 20 MIN: CPT | Performed by: FAMILY MEDICINE

## 2022-07-01 RX ORDER — ACYCLOVIR 800 MG/1
800 TABLET ORAL 3 TIMES DAILY
Qty: 6 TABLET | Refills: 0 | Status: SHIPPED | OUTPATIENT
Start: 2022-07-01 | End: 2022-07-03

## 2022-07-01 NOTE — PROGRESS NOTES
Antonio Martinez is a 39 y.o. female   Chief Complaint   Patient presents with   Peggi Conine Exam     pt c/o raised bump on outside of vagina for >2 yrs; requesting pap - of note, pap in 2021 was neg         ASSESSMENT AND PLAN:    1. Vesicles  Visually c/w herpetic vesicles, though timing and tenderness is inconsistent. She has a known h/o HSV. Will treat as such. Follow up in 4-6 weeks to assess for resolution/change. Consider biopsy vs. Watchful waiting if persistent. - acyclovir (ZOVIRAX) 800 mg tablet; Take 1 Tablet by mouth three (3) times daily for 2 days. Dispense: 6 Tablet; Refill: 0          SUBJECTIVE:    HPI:  Júnior Brightly. Erin Lindsey is a 39 y.o. female who presents with concern for bumps that she has felt at her vaginal intraoitus for about 2 years. She has felt them. She does not believe they have changed over 2 years. Nontender, nonpruritic. She has not noticed them rupturing. She does have a history of genital HSV during her pregnancy and was given suppression during delivery. She denies vaginal discharge. She has an IUD and her periods are not always regular. LMP 6/7/22. She does shave her pubic region. She uses regular soap to bathe. No new hygiene products, detergents, underwear. Patient reports a normal Pap last year (June 2021[facility]. Cotesting in our system from 2014 and 2017 was negative. Review of Systems   Constitutional: Negative for fever and malaise/fatigue. Eyes: Negative for blurred vision. Respiratory: Negative for cough and shortness of breath. Cardiovascular: Negative for chest pain, palpitations and leg swelling. Gastrointestinal: Negative for abdominal pain, constipation, diarrhea, nausea and vomiting. Neurological: Negative for dizziness and headaches.          /76 (BP 1 Location: Left upper arm, BP Patient Position: Sitting)   Pulse 92   Temp 98.4 °F (36.9 °C) (Oral)   Resp 16   Ht 5' 4.45\" (1.637 m)   Wt 174 lb 6.4 oz (79.1 kg) LMP 06/06/2022   SpO2 99%   BMI 29.52 kg/m²     Physical Exam  Constitutional:       General: She is not in acute distress. Appearance: Normal appearance. Eyes:      Extraocular Movements: Extraocular movements intact. Conjunctiva/sclera: Conjunctivae normal.      Pupils: Pupils are equal, round, and reactive to light. Cardiovascular:      Rate and Rhythm: Normal rate and regular rhythm. Heart sounds: Normal heart sounds. Pulmonary:      Effort: Pulmonary effort is normal.      Breath sounds: Normal breath sounds. Genitourinary:     Labia:         Right: No rash. Left: No rash. Vagina: No vaginal discharge. Neurological:      Mental Status: She is alert.

## 2022-07-01 NOTE — PROGRESS NOTES
Chief Complaint   Patient presents with   Amalia Sers Exam     pt c/o raised bump on outside of vagina for >2 yrs; requesting pap - of note, pap in 2021 was neg     Coordination of Care  1. Have you been to the ER, urgent care clinic since your last visit? Hospitalized since your last visit? No    2. Have you seen or consulted any other health care providers outside of the [address] [place] [address] since your last visit? Include any pap smears or colon screening. No    Does the patient need refills? NO    Learning Assessment Complete?  yes  Depression Screening complete in the past 12 months? yes

## 2022-07-01 NOTE — PROGRESS NOTES
Telma Benjamín seen at d/c, full name and  verified, given After visit Summary and reviewed today's visit with patient along with instructions on when it is recommended to come back. I reviewed the medication list with the patient to ensure she knows how to and when to take her medications. Side effects, mechanisms of action and medication compliance were reiterated to ensure proper understanding. I have reviewed the provider's instructions with the patient, answering all questions to her satisfaction. Patient verbalized understanding.   Meryl Gilman RN

## 2022-07-01 NOTE — PROGRESS NOTES
OW met with patient. Patient FA has already been filled out and submitted. Patient brought a letter she received from [facility] FA. FA is asking patient to complete [employer] screening. OW explained to patient what the letter says (Patient doesn't understand English). [facility] to [facility] and complete [employer] screening. Patient verbalized understanding. Phone number is on the letter patient received. OW highlighted the number where patient needs to call. SDOH screening was completed. Patient opted out.

## 2022-07-27 ENCOUNTER — TELEPHONE (OUTPATIENT)
Dept: FAMILY MEDICINE CLINIC | Age: 37
End: 2022-07-27

## 2022-07-27 NOTE — TELEPHONE ENCOUNTER
OW mailed a copy of FA letter to patient, requesting to complete Medicaid screening with [facility] in order to process patient's FA application.

## 2022-08-15 ENCOUNTER — OFFICE VISIT (OUTPATIENT)
Dept: FAMILY MEDICINE CLINIC | Age: 37
End: 2022-08-15

## 2022-08-15 VITALS
DIASTOLIC BLOOD PRESSURE: 70 MMHG | OXYGEN SATURATION: 98 % | HEART RATE: 74 BPM | SYSTOLIC BLOOD PRESSURE: 115 MMHG | WEIGHT: 174 LBS | BODY MASS INDEX: 29.71 KG/M2 | HEIGHT: 64 IN | TEMPERATURE: 97.7 F

## 2022-08-15 DIAGNOSIS — A60.04 HERPES SIMPLEX VULVOVAGINITIS: Primary | ICD-10-CM

## 2022-08-15 DIAGNOSIS — Z71.89 COUNSELING AND COORDINATION OF CARE: Primary | ICD-10-CM

## 2022-08-15 PROCEDURE — 99080 SPECIAL REPORTS OR FORMS: CPT | Performed by: NURSE PRACTITIONER

## 2022-08-15 PROCEDURE — 99213 OFFICE O/P EST LOW 20 MIN: CPT | Performed by: FAMILY MEDICINE

## 2022-08-15 RX ORDER — ACYCLOVIR 800 MG/1
800 TABLET ORAL 3 TIMES DAILY
Qty: 6 TABLET | Refills: 5 | Status: SHIPPED | OUTPATIENT
Start: 2022-08-15 | End: 2022-08-17

## 2022-08-15 NOTE — PROGRESS NOTES
Virgen Rucker is a 39 y.o. female   Chief Complaint   Patient presents with    Well Woman     PAP smear for today    Other     Pt needs an appt with  for bills         ASSESSMENT AND PLAN:    1. Herpes simplex vulvovaginitis  Improving (only one lesion left that is mostly healed) no pain or itching. Acyclovir given to take at onset of flares. - acyclovir (ZOVIRAX) 800 mg tablet; Take 1 Tablet by mouth three (3) times daily for 2 days. Dispense: 6 Tablet; Refill: 5      SUBJECTIVE:    HPI:  Vitogarcia Mccarthy Toshia Herrera is a 39 y.o. female who presents  for followup. She was seen 7/1 with tender vesicles at her intraoitus visually consistent with HPV. She was treated with acyclovir PO. She reports there are fewer lesions now and she does not have pain or itching. No new lesions developed. She notes that since she has had her IUD her bleeding has been irregular. Last Monday she spotted and it stopped. Friday she had intercourse and afterwards she spotted again until Saturday. This happens occasionally with sex. No pain with intercourse. Rare pelvic pain between periods. Review of Systems   Constitutional:  Negative for fever and malaise/fatigue. Eyes:  Negative for blurred vision. Respiratory:  Negative for cough and shortness of breath. Cardiovascular:  Negative for chest pain, palpitations and leg swelling. Gastrointestinal:  Negative for abdominal pain, constipation, diarrhea, nausea and vomiting. Neurological:  Positive for headaches (last Monday when she was spotting). Negative for dizziness. /70 (BP 1 Location: Left upper arm, BP Patient Position: Sitting)   Pulse 74   Temp 97.7 °F (36.5 °C) (Temporal)   Ht 5' 4.45\" (1.637 m)   Wt 174 lb (78.9 kg)   LMP 08/05/2022   SpO2 98%   BMI 29.45 kg/m²     Physical Exam  Constitutional:       General: She is not in acute distress. Appearance: Normal appearance.    Pulmonary:      Effort: Pulmonary effort is normal. Abdominal:      Palpations: Abdomen is soft. Tenderness: There is no abdominal tenderness. Genitourinary:     Comments: No current vesicles. Single healed lesion at intraoitus  Skin tag on perineum. No internal vesicles or ulcerations. IUD strings visible  No CMT tenderness. No current bleeding. Neurological:      Mental Status: She is alert.

## 2022-08-15 NOTE — PROGRESS NOTES
Verified name and . An AVS was printed and given to the patient. Reviewed all discharge instructions, including: continuing medications and f/up appt. Allowed time for questions and patient verbalized understanding to all given instructions. Patient discharged from clinic in stable condition.

## 2022-08-15 NOTE — PROGRESS NOTES
Coordination of Care  1. Have you been to the ER, urgent care clinic since your last visit? Hospitalized since your last visit? No    2. Have you seen or consulted any other health care providers outside of the [address] [place] [address] since your last visit? Include any pap smears or colon screening. No    Does the patient need refills? NO    Learning Assessment Complete?  yes  Depression Screening complete in the past 12 months? yes

## 2022-08-15 NOTE — PROGRESS NOTES
Assisted patient with questions about her FA application. Per patient she called [facility] to complete screening for medicaid where she was informed she does not qualify for medicaid and that she would qualify for FA from the hospital. OW provided the phone number for patient to call and follow up advise patient to wait a couple of days to call since patient called Thursday, August 11th. Outcome from [employer] screening is not yet in the system.

## 2022-09-02 ENCOUNTER — TELEPHONE (OUTPATIENT)
Dept: FAMILY MEDICINE CLINIC | Age: 37
End: 2022-09-02

## 2022-09-02 NOTE — TELEPHONE ENCOUNTER
Called patient to assist with bills. Unable to speak with her or leave a message. Patient's mail box has not been set up yet. OW sent a text message to patient letting her know she needs to call [facility] FA to [phone] and f/up with her FA application.

## 2022-09-22 ENCOUNTER — TELEPHONE (OUTPATIENT)
Dept: FAMILY MEDICINE CLINIC | Age: 37
End: 2022-09-22

## 2022-09-22 NOTE — TELEPHONE ENCOUNTER
OW called patient to f/up with FA. Patient said she already called the hospital and they told her that her FA has been approved. OW instructed patient to call the number on the bill, in case she receives another bill. Patient verbalized understanding.